# Patient Record
Sex: FEMALE | Race: WHITE | NOT HISPANIC OR LATINO | Employment: OTHER | URBAN - METROPOLITAN AREA
[De-identification: names, ages, dates, MRNs, and addresses within clinical notes are randomized per-mention and may not be internally consistent; named-entity substitution may affect disease eponyms.]

---

## 2017-02-06 NOTE — TELEPHONE ENCOUNTER
Metoprolol       Last Written Prescription Date: 02/06/2017  Last Fill Quantity: 180, # refills: 0  Last Office Visit with [facility], [facility] or [facility] prescribing provider: 06/30/2016       POTASSIUM   Date Value Ref Range Status   12/31/2015 5.1 3.4 - 5.3 mmol/L Final     CREATININE   Date Value Ref Range Status   12/31/2015 0.95 0.52 - 1.04 mg/dL Final     BP Readings from Last 3 Encounters:   06/30/16 124/70   12/31/15 160/80   06/08/15 135/79

## 2017-02-07 RX ORDER — METOPROLOL TARTRATE 25 MG/1
TABLET, FILM COATED ORAL
Qty: 180 TABLET | Refills: 0
Start: 2017-02-07

## 2017-02-10 ENCOUNTER — MYC MEDICAL ADVICE (OUTPATIENT)
Dept: OPHTHALMOLOGY | Facility: CLINIC | Age: 82
End: 2017-02-10

## 2017-02-10 NOTE — TELEPHONE ENCOUNTER
I spoke with Antoni () they want to set a time for 6-17 for routine COMPLETE EYE EXAM... Kelly will call patient. For scheduling exam.

## 2017-02-18 ENCOUNTER — MYC REFILL (OUTPATIENT)
Dept: FAMILY MEDICINE | Facility: CLINIC | Age: 82
End: 2017-02-18

## 2017-02-18 DIAGNOSIS — I10 BENIGN ESSENTIAL HYPERTENSION: ICD-10-CM

## 2017-02-20 RX ORDER — LISINOPRIL 5 MG/1
TABLET ORAL
Qty: 90 TABLET | Refills: 0 | OUTPATIENT
Start: 2017-02-20

## 2017-02-20 RX ORDER — LISINOPRIL 5 MG/1
5 TABLET ORAL DAILY
Qty: 90 TABLET | Refills: 0 | Status: SHIPPED | OUTPATIENT
Start: 2017-02-20 | End: 2017-05-13

## 2017-02-20 NOTE — TELEPHONE ENCOUNTER
Message from [employer]:  Edie Barlow Feb 20, 2017 7:37 AM        ----- Message -----   From: Raeann A Day   Sent: 2/18/2017 7:34 AM   To: Gerry Babb  Subject: Medication Renewal Request     Original authorizing provider: STAR Courtney CNP A Day would like a refill of the following medications:  lisinopril (PRINIVIL,ZESTRIL) 5 MG tablet [STAR Courtney CNP]    Preferred pharmacy: [facility] - [place], [place] - [address]    Comment:  Quantity 90, please

## 2017-02-24 ENCOUNTER — OFFICE VISIT (OUTPATIENT)
Dept: FAMILY MEDICINE | Facility: CLINIC | Age: 82
End: 2017-02-24
Payer: MEDICARE

## 2017-02-24 VITALS
HEIGHT: 62 IN | DIASTOLIC BLOOD PRESSURE: 60 MMHG | HEART RATE: 72 BPM | OXYGEN SATURATION: 97 % | WEIGHT: 173.4 LBS | BODY MASS INDEX: 31.91 KG/M2 | SYSTOLIC BLOOD PRESSURE: 116 MMHG | TEMPERATURE: 98.5 F

## 2017-02-24 DIAGNOSIS — I25.810 CORONARY ARTERY DISEASE INVOLVING AUTOLOGOUS ARTERY CORONARY BYPASS GRAFT WITHOUT ANGINA PECTORIS: ICD-10-CM

## 2017-02-24 DIAGNOSIS — N18.30 CKD (CHRONIC KIDNEY DISEASE) STAGE 3, GFR 30-59 ML/MIN (H): ICD-10-CM

## 2017-02-24 DIAGNOSIS — I10 HYPERTENSION GOAL BP (BLOOD PRESSURE) < 140/90: Primary | ICD-10-CM

## 2017-02-24 DIAGNOSIS — E78.5 HYPERLIPIDEMIA LDL GOAL <70: ICD-10-CM

## 2017-02-24 LAB
ANION GAP SERPL CALCULATED.3IONS-SCNC: 9 MMOL/L (ref 3–14)
BUN SERPL-MCNC: 17 MG/DL (ref 7–30)
CALCIUM SERPL-MCNC: 9.2 MG/DL (ref 8.5–10.1)
CHLORIDE SERPL-SCNC: 99 MMOL/L (ref 94–109)
CO2 SERPL-SCNC: 29 MMOL/L (ref 20–32)
CREAT SERPL-MCNC: 0.86 MG/DL (ref 0.52–1.04)
GFR SERPL CREATININE-BSD FRML MDRD: 63 ML/MIN/1.7M2
GLUCOSE SERPL-MCNC: 122 MG/DL (ref 70–99)
HGB BLD-MCNC: 13.2 G/DL (ref 11.7–15.7)
POTASSIUM SERPL-SCNC: 4 MMOL/L (ref 3.4–5.3)
SODIUM SERPL-SCNC: 137 MMOL/L (ref 133–144)

## 2017-02-24 PROCEDURE — 85018 HEMOGLOBIN: CPT | Performed by: NURSE PRACTITIONER

## 2017-02-24 PROCEDURE — 99214 OFFICE O/P EST MOD 30 MIN: CPT | Performed by: NURSE PRACTITIONER

## 2017-02-24 PROCEDURE — 36415 COLL VENOUS BLD VENIPUNCTURE: CPT | Performed by: NURSE PRACTITIONER

## 2017-02-24 PROCEDURE — 80048 BASIC METABOLIC PNL TOTAL CA: CPT | Performed by: NURSE PRACTITIONER

## 2017-02-24 NOTE — MR AVS SNAPSHOT
After Visit Summary   2/24/2017    Raeann Abdalla    MRN: [id number]           Patient Information     Date Of Birth          1935        Visit Information        Provider Department      2/24/2017 2:40 PM Luz Coughlin APRN [facility]        Today's Diagnoses     Hypertension goal BP (blood pressure) < 140/90    -  1    CKD (chronic kidney disease) stage 3, GFR 30-59 ml/min        Hyperlipidemia LDL goal <70        Coronary artery disease involving autologous artery coronary bypass graft without angina pectoris          Care Instructions    [facility]    If you have any questions regarding to your visit please contact your care team:     Team Pink:   Clinic Hours Telephone Number   Internal Medicine:  Dr. Yissel Coughlin, NP       7am-7pm  Monday - Thursday   7am-5pm  Fridays  [phone]  (Appointment scheduling available 24/7)    Questions about your visit?  Team Line  [phone]   Urgent Care - [place] - 11am-9pm Monday-Friday Saturday-Sunday- 9am-5pm   [place] - 5pm-9pm Monday-Friday Saturday-Sunday- 9am-5pm  [phone] - [place]   [phone] - [place]       What options do I have for visits at the clinic other than the traditional office visit?  To expand how we care for you, many of our providers are utilizing electronic visits (e-visits) and telephone visits, when medically appropriate, for interactions with their patients rather than a visit in the clinic.   We also offer nurse visits for many medical concerns. Just like any other service, we will bill your insurance company for this type of visit based on time spent on the phone with your provider. Not all insurance companies cover these visits. Please check with your medical insurance if this type of visit is covered. You will be responsible for any charges that are not paid by your insurance.      E-visits via  Algorithmiahart:  generally incur a $35.00 fee.  Telephone visits:  Time spent on the phone: *charged based on time that is spent on the phone in increments of 10 minutes. Estimated cost:   5-10 mins $30.00   11-20 mins. $59.00   21-30 mins. $85.00   Use Algorithmiahart (secure email communication and access to your chart) to send your primary care provider a message or make an appointment. Ask someone on your Team how to sign up for [employer].    For a Price Quote for your services, please call our [employer] Price Line at [phone].    As always, Thank you for trusting us with your health care needs!    Discharged by Marissa KEARNEY CMA ([facility])          Follow-ups after your visit        Your next 10 appointments already scheduled     Jun 23, 2017  2:30 PM CDT   New Visit with Ifeanyi Rosario MD   [facility] ([address] [address] [phone]              Who to contact     If you have questions or need follow up information about today's clinic visit or your schedule please contact [facility] directly at [phone].  Normal or non-critical lab and imaging results will be communicated to you by MyChart, letter or phone within 4 business days after the clinic has received the results. If you do not hear from us within 7 days, please contact the clinic through MyChart or phone. If you have a critical or abnormal lab result, we will notify you by phone as soon as possible.  Submit refill requests through [employer] or call your pharmacy and they will forward the refill request to us. Please allow 3 business days for your refill to be completed.          Additional Information About Your Visit        Algorithmiahart Information     [employer] gives you secure access to your electronic health record. If you see a primary care provider, you can also send messages to your care team and make appointments. If you have questions, please call your primary care clinic.  If you  "do not have a primary care provider, please call [phone] and they will assist you.        Care EveryWhere ID     This is your Care EveryWhere ID. This could be used by other organizations to access your [facility] medical records  [id number]        Your Vitals Were     Pulse Temperature Height Pulse Oximetry BMI (Body Mass Index)       72 98.5  F (36.9  C) (Oral) 5' 1.5\" (1.562 m) 97% 32.23 kg/m2        Blood Pressure from Last 3 Encounters:   02/24/17 116/60   06/30/16 124/70   12/31/15 160/80    Weight from Last 3 Encounters:   02/24/17 173 lb 6.4 oz (78.7 kg)   06/30/16 168 lb (76.2 kg)   12/31/15 168 lb (76.2 kg)              We Performed the Following     Basic metabolic panel     Hemoglobin        Primary Care Provider Office Phone # Fax #    Luz HollidaySTAR Bhatti [facility] [phone]       [address] [address]        Thank you!     Thank you for choosing [facility]  for your care. Our goal is always to provide you with excellent care. Hearing back from our patients is one way we can continue to improve our services. Please take a few minutes to complete the written survey that you may receive in the mail after your visit with us. Thank you!             Your Updated Medication List - Protect others around you: Learn how to safely use, store and throw away your medicines at www.disposemymeds.org.          This list is accurate as of: 2/24/17  3:14 PM.  Always use your most recent med list.                   Brand Name Dispense Instructions for use    aspirin 325 MG tablet      Take 325 mg by mouth daily Patient takes 162.5 mg       carboxymethylcellulose 0.5 % Soln ophthalmic solution    REFRESH PLUS     Place 1 drop into both eyes daily as needed for dry eyes       lisinopril 5 MG tablet    PRINIVIL/ZESTRIL    90 tablet    Take 1 tablet (5 mg) by mouth daily Please schedule follow up visit       metoprolol 25 MG tablet    " LOPRESSOR    180 tablet    Take 1 tablet (25 mg) by mouth 2 times daily       MULTI VITAMIN/MINERALS PO      Take 1 tablet by mouth once.       SIMVASTATIN PO      Take 20 mg by mouth At Bedtime

## 2017-02-24 NOTE — PATIENT INSTRUCTIONS
[facility]    If you have any questions regarding to your visit please contact your care team:     Team Pink:   Clinic Hours Telephone Number   Internal Medicine:  Dr. Yissel Coughlin NP       7am-7pm  Monday - Thursday   7am-5pm  Fridays  [phone]  (Appointment scheduling available 24/7)    Questions about your visit?  Team Line  [phone]   [facility] - [place] [facility] - 11am-9pm Monday-Friday Saturday-Sunday- 9am-5pm   [place] - 5pm-9pm Monday-Friday Saturday-Sunday- 9am-5pm  [phone] - [place]   [phone] - [place]       What options do I have for visits at the clinic other than the traditional office visit?  To expand how we care for you, many of our providers are utilizing electronic visits (e-visits) and telephone visits, when medically appropriate, for interactions with their patients rather than a visit in the clinic.   We also offer nurse visits for many medical concerns. Just like any other service, we will bill your insurance company for this type of visit based on time spent on the phone with your provider. Not all insurance companies cover these visits. Please check with your medical insurance if this type of visit is covered. You will be responsible for any charges that are not paid by your insurance.      E-visits via [employer]:  generally incur a $35.00 fee.  Telephone visits:  Time spent on the phone: *charged based on time that is spent on the phone in increments of 10 minutes. Estimated cost:   5-10 mins $30.00   11-20 mins. $59.00   21-30 mins. $85.00   Use Sribut (secure email communication and access to your chart) to send your primary care provider a message or make an appointment. Ask someone on your Team how to sign up for [employer].    For a Price Quote for your services, please call our Consumer Price Line at [phone].    As always, Thank you for trusting us with your health care  needs!    Discharged by Marissa KEARNEY CMA ([facility])

## 2017-02-24 NOTE — PROGRESS NOTES
Dear Raeann,    Your recent test results are attached.      No anemia.    If you have any questions please feel free to contact [phone] or myself via [employer].    Sincerely,  Luz Coughlin, CNP

## 2017-02-24 NOTE — NURSING NOTE
"Chief Complaint   Patient presents with     Hypertension     follow up bp        Initial /78 (BP Location: Right arm, Patient Position: Chair, Cuff Size: Adult Regular)  Pulse 72  Temp 98.5  F (36.9  C) (Oral)  Ht 5' 1.5\" (1.562 m)  Wt 173 lb 6.4 oz (78.7 kg)  SpO2 97%  BMI 32.23 kg/m2 Estimated body mass index is 32.23 kg/(m^2) as calculated from the following:    Height as of this encounter: 5' 1.5\" (1.562 m).    Weight as of this encounter: 173 lb 6.4 oz (78.7 kg).  Medication Reconciliation: complete   Marissa KEARNEY CMA ([facility])      "

## 2017-02-24 NOTE — PROGRESS NOTES
SUBJECTIVE:                                                    Raeann Abdalla is a 81 year old female who presents to clinic today for the following health issues:        Patient presents with:  Hypertension: follow up bp     Hyperlipidemia Follow-Up      Rate your low fat/cholesterol diet?: not monitoring fat    Taking statin?  Yes, no muscle aches from statin    Other lipid medications/supplements?:  none     Hypertension Follow-up      Outpatient blood pressures are being checked at home.  Results are 130-150.    Low Salt Diet: no added salt     Vascular Disease Follow-up:  Coronary Artery Disease (CAD)      Chest pain or pressure, left side neck or arm pain: No    Shortness of breath/increased sweats/nausea with exertion: No    Pain in calves walking 1-2 blocks: No    Worsened or new symptoms since last visit: No    Nitroglycerin use: no    Daily aspirin use: Yes     Chronic Kidney Disease Follow-up      Current NSAID use?  No      Problem list and histories reviewed & adjusted, as indicated.  Additional history: as documented    Patient Active Problem List   Diagnosis     Cataract, mod, ou     Hx of retinal hemorrhage     Hypertension goal BP (blood pressure) < 140/90     S/P AVR (aortic valve replacement)     S/P CABG (coronary artery bypass graft)     Short-term memory loss     S/P bilateral hip replacements     History of right-sided carotid endarterectomy     CKD (chronic kidney disease) stage 3, GFR 30-59 ml/min     Hyperlipidemia LDL goal <70     Coronary artery disease involving autologous artery coronary bypass graft without angina pectoris     Posterior vitreous detachment, bilateral     Past Surgical History   Procedure Laterality Date     Ent surgery       Tonsillectomy N/A      Joint replacement, hip rt/lt Bilateral      Endarectomy Right      carotid     Aortic valve replacement       Bypass graft artery coronary       X 3       Social History   Substance Use Topics     Smoking status: Never  "Smoker     Smokeless tobacco: Not on file     Alcohol use 0.0 oz/week     0 Standard drinks or equivalent per week      Comment: one beer a day     Family History   Problem Relation Age of Onset     Other Cancer Sister      Macular Degeneration Mother 75         Current Outpatient Prescriptions   Medication Sig Dispense Refill     lisinopril (PRINIVIL/ZESTRIL) 5 MG tablet Take 1 tablet (5 mg) by mouth daily Please schedule follow up visit 90 tablet 0     metoprolol (LOPRESSOR) 25 MG tablet Take 1 tablet (25 mg) by mouth 2 times daily 180 tablet 0     carboxymethylcellulose (REFRESH PLUS) 0.5 % SOLN Place 1 drop into both eyes daily as needed for dry eyes       aspirin 325 MG tablet Take 325 mg by mouth daily Patient takes 162.5 mg       SIMVASTATIN PO Take 20 mg by mouth At Bedtime        Multiple Vitamins-Minerals (MULTI VITAMIN/MINERALS PO) Take 1 tablet by mouth once.       Allergies   Allergen Reactions     Sulfa Drugs      BP Readings from Last 3 Encounters:   02/24/17 116/60   06/30/16 124/70   12/31/15 160/80    Wt Readings from Last 3 Encounters:   02/24/17 173 lb 6.4 oz (78.7 kg)   06/30/16 168 lb (76.2 kg)   12/31/15 168 lb (76.2 kg)                  Problem list, Medication list, Allergies, and Medical/Social/Surgical histories reviewed in [facility] and updated as appropriate.    ROS:  Constitutional, HEENT, cardiovascular, pulmonary, gi and gu systems are negative, except as otherwise noted.    OBJECTIVE:                                                    /60  Pulse 72  Temp 98.5  F (36.9  C) (Oral)  Ht 5' 1.5\" (1.562 m)  Wt 173 lb 6.4 oz (78.7 kg)  SpO2 97%  BMI 32.23 kg/m2  Body mass index is 32.23 kg/(m^2).  GENERAL: healthy, alert and no distress  EYES: Eyes grossly normal to inspection, PERRL and conjunctivae and sclerae normal  HENT: ear canals and TM's normal, nose and mouth without ulcers or lesions  NECK: no adenopathy, no asymmetry, masses, or scars and thyroid normal to palpation  RESP: lungs " clear to auscultation - no rales, rhonchi or wheezes  CV: regular rate and rhythm, normal S1 S2, no S3 or S4, no murmur, click or rub, no peripheral edema and peripheral pulses strong  ABDOMEN: soft, nontender, no hepatosplenomegaly, no masses and bowel sounds normal  MS: no gross musculoskeletal defects noted, no edema    Diagnostic Test Results:  pending     ASSESSMENT/PLAN:                                                      1. Hypertension goal BP (blood pressure) < 140/90  Stable.  Continue current treatment plan and medications.    - Hemoglobin    2. CKD (chronic kidney disease) stage 3, GFR 30-59 ml/min    - Basic metabolic panel  - Hemoglobin    3. Hyperlipidemia LDL goal <70  Stable.  Continue current treatment plan and medications.      4. Coronary artery disease involving autologous artery coronary bypass graft without angina pectoris  Stable.  Continue current treatment plan and medications.        FUTURE APPOINTMENTS:       - Follow-up for annual visit or as needed    STAR Courtney [facility]

## 2017-02-27 NOTE — PROGRESS NOTES
Dear Raeann,    Your recent test results are attached.      Improved kidney function.    If you have any questions please feel free to contact [phone] or myself via [employer].    Sincerely,  Luz Coughlin, CNP

## 2017-03-25 ENCOUNTER — TRANSFERRED RECORDS (OUTPATIENT)
Dept: HEALTH INFORMATION MANAGEMENT | Facility: CLINIC | Age: 82
End: 2017-03-25

## 2017-05-13 ENCOUNTER — MYC REFILL (OUTPATIENT)
Dept: FAMILY MEDICINE | Facility: CLINIC | Age: 82
End: 2017-05-13

## 2017-05-13 DIAGNOSIS — I10 BENIGN ESSENTIAL HYPERTENSION: ICD-10-CM

## 2017-05-15 RX ORDER — LISINOPRIL 5 MG/1
5 TABLET ORAL DAILY
Qty: 90 TABLET | Refills: 2 | Status: SHIPPED | OUTPATIENT
Start: 2017-05-15 | End: 2017-07-20

## 2017-05-15 RX ORDER — METOPROLOL TARTRATE 25 MG/1
25 TABLET, FILM COATED ORAL 2 TIMES DAILY
Qty: 180 TABLET | Refills: 2 | Status: SHIPPED | OUTPATIENT
Start: 2017-05-15 | End: 2018-02-10

## 2017-05-15 NOTE — TELEPHONE ENCOUNTER
Message from [employer]:  Edie Barlow May 15, 2017 7:34 AM        ----- Message -----   From: Raeann Abdalla   Sent: 5/13/2017 7:40 AM   To: Gerry Babb  Subject: Medication Renewal Request     Original authorizing provider: STAR Courtney CNP would like a refill of the following medications:  metoprolol (LOPRESSOR) 25 MG tablet [STAR Courtney CNP]  lisinopril (PRINIVIL/ZESTRIL) 5 MG tablet [STAR Courtney CNP]    [facility] pharmacy: [facility] [address]    Comment:  Lisinopril qty 90 Metoprolol tartrate qty 180

## 2017-05-15 NOTE — TELEPHONE ENCOUNTER
Prescription approved per [facility] Refill Protocol.    Signed Prescriptions:                        Disp   Refills    metoprolol (LOPRESSOR) 25 MG tablet        180 ta*2        Sig: Take 1 tablet (25 mg) by mouth 2 times daily  Authorizing Provider: NIKKO JACOB  Ordering User: OSWALDO CARSON    lisinopril (PRINIVIL/ZESTRIL) 5 MG tablet  90 tab*2        Sig: Take 1 tablet (5 mg) by mouth daily Please schedule           follow up visit  Authorizing Provider: NIKKO JACOB  Ordering User: OSWALDO CARSON, RN, BSN

## 2017-05-16 RX ORDER — METOPROLOL TARTRATE 25 MG/1
TABLET, FILM COATED ORAL
Qty: 180 TABLET | Refills: 0
Start: 2017-05-16

## 2017-05-16 RX ORDER — LISINOPRIL 5 MG/1
TABLET ORAL
Qty: 90 TABLET | Refills: 0
Start: 2017-05-16

## 2017-05-16 NOTE — TELEPHONE ENCOUNTER
Prescription jasmin'd up has now been removed from encounter.    Closing encounter.    Kayleen Bustos RN

## 2017-05-16 NOTE — TELEPHONE ENCOUNTER
Writer called pharmacy to confirm refills remaining pharmacy confirms  Rx  received 05/15 for both medication metoprolol and lisinopril  please close encounter      Shara Hdz, CMA

## 2017-06-23 ENCOUNTER — OFFICE VISIT (OUTPATIENT)
Dept: OPHTHALMOLOGY | Facility: CLINIC | Age: 82
End: 2017-06-23
Payer: MEDICARE

## 2017-06-23 DIAGNOSIS — H43.813 POSTERIOR VITREOUS DETACHMENT, BILATERAL: ICD-10-CM

## 2017-06-23 DIAGNOSIS — H26.9 CATARACT: Primary | ICD-10-CM

## 2017-06-23 DIAGNOSIS — H52.4 PRESBYOPIA: ICD-10-CM

## 2017-06-23 PROCEDURE — 92015 DETERMINE REFRACTIVE STATE: CPT | Mod: GY | Performed by: OPHTHALMOLOGY

## 2017-06-23 PROCEDURE — 92014 COMPRE OPH EXAM EST PT 1/>: CPT | Performed by: OPHTHALMOLOGY

## 2017-06-23 ASSESSMENT — REFRACTION_WEARINGRX
OD_ADD: +3.00
OS_SPHERE: -1.50
OS_CYLINDER: +1.50
OD_CYLINDER: +0.50
OD_SPHERE: -0.75
OD_AXIS: 160
OS_ADD: +3.00
SPECS_TYPE: PAL
OS_AXIS: 170

## 2017-06-23 ASSESSMENT — CONF VISUAL FIELD
OS_NORMAL: 1
OD_NORMAL: 1

## 2017-06-23 ASSESSMENT — VISUAL ACUITY
OD_CC: 20/50
OS_CC+: -2
OS_CC: J10
OS_CC: 20/50
METHOD: SNELLEN - LINEAR
OD_CC: J10
CORRECTION_TYPE: GLASSES

## 2017-06-23 ASSESSMENT — CUP TO DISC RATIO
OD_RATIO: 0.2
OS_RATIO: 0.2

## 2017-06-23 ASSESSMENT — REFRACTION_MANIFEST
OD_SPHERE: -1.25
OS_SPHERE: -2.00
OD_ADD: +3.00
OD_AXIS: 155
OS_AXIS: 010
OS_ADD: +3.00
OD_CYLINDER: +0.25
OS_CYLINDER: +0.75

## 2017-06-23 ASSESSMENT — SLIT LAMP EXAM - LIDS
COMMENTS: 2+ DERMATOCHALASIS - UPPER LID
COMMENTS: 2+ DERMATOCHALASIS - UPPER LID

## 2017-06-23 ASSESSMENT — TONOMETRY
OS_IOP_MMHG: 16
IOP_METHOD: APPLANATION
OD_IOP_MMHG: 14

## 2017-06-23 ASSESSMENT — EXTERNAL EXAM - RIGHT EYE: OD_EXAM: MILD BROW, PROLAPSED FAT PADS: UPPER, LOWER

## 2017-06-23 ASSESSMENT — EXTERNAL EXAM - LEFT EYE: OS_EXAM: MILD BROW, PROLAPSED FAT PADS: UPPER, LOWER

## 2017-06-23 NOTE — PATIENT INSTRUCTIONS
Glasses Rx given - optional   Plan Kelman phacoemulsification/ posterior chamber lens right eye local standby.  Risks, benefits, complications, and alternatives discussed with patient including possibility of limitations from coexistent eye disease and loss of vision.  Target refraction and multifocal lens options discussed.  Patient understands and consents.  Ifeanyi Rosario M.D.

## 2017-06-23 NOTE — MR AVS SNAPSHOT
After Visit Summary   6/23/2017    Raeann Abdalla    MRN: [id number]           Patient Information     Date Of Birth          1935        Visit Information        Provider Department      6/23/2017 2:30 PM Ifeanyi Rosario MD [facility]        Today's Diagnoses     Cataract, mod, ou    -  1    Posterior vitreous detachment, bilateral        Myopia, bilateral        Irregular astigmatism, bilateral        Presbyopia          Care Instructions    Glasses Rx given - optional   Plan Kelman phacoemulsification/ posterior chamber lens right eye local standby.  Risks, benefits, complications, and alternatives discussed with patient including possibility of limitations from coexistent eye disease and loss of vision.  Target refraction and multifocal lens options discussed.  Patient understands and consents.  Ifeanyi Rosario M.D.            Follow-ups after your visit        Who to contact     If you have questions or need follow up information about today's clinic visit or your schedule please contact [facility] directly at [phone].  Normal or non-critical lab and imaging results will be communicated to you by beprettyhart, letter or phone within 4 business days after the clinic has received the results. If you do not hear from us within 7 days, please contact the clinic through [employer] or phone. If you have a critical or abnormal lab result, we will notify you by phone as soon as possible.  Submit refill requests through [employer] or call your pharmacy and they will forward the refill request to us. Please allow 3 business days for your refill to be completed.          Additional Information About Your Visit        beprettyhart Information     [employer] gives you secure access to your electronic health record. If you see a primary care provider, you can also send messages to your care team and make appointments. If you have questions, please call your primary care clinic.  If you do  not have a primary care provider, please call [phone] and they will assist you.        Care EveryWhere ID     This is your Care EveryWhere ID. This could be used by other organizations to access your [facility] medical records  [id number]         Blood Pressure from Last 3 Encounters:   02/24/17 116/60   06/30/16 124/70   12/31/15 160/80    Weight from Last 3 Encounters:   02/24/17 78.7 kg (173 lb 6.4 oz)   06/30/16 76.2 kg (168 lb)   12/31/15 76.2 kg (168 lb)              We Performed the Following     EYE EXAM (SIMPLE-NONBILLABLE)     REFRACTION        Primary Care Provider Office Phone # Fax #    Luz HollidaySTAR Bhatti [facility] [phone]       [address] [address]        Equal Access to Services     FERNANDA MARTÍNEZ : Hadii aad ku hadasho Soomaali, waaxda luqadaha, qaybta kaalmada adeegyada, waxay idiin hayaan darren marrufoararoger ash . So [facility] [phone].    ATENCIÓN: Si habla español, tiene a sharma disposición servicios gratuitos de asistencia lingüística. Ashly al [phone].    We comply with applicable federal civil rights laws and [place] laws. We do not discriminate on the basis of race, color, national origin, age, disability sex, sexual orientation or gender identity.            Thank you!     Thank you for choosing [facility]  for your care. Our goal is always to provide you with excellent care. Hearing back from our patients is one way we can continue to improve our services. Please take a few minutes to complete the written survey that you may receive in the mail after your visit with us. Thank you!             Your Updated Medication List - Protect others around you: Learn how to safely use, store and throw away your medicines at www.disposemymeds.org.          This list is accurate as of: 6/23/17  3:20 PM.  Always use your most recent med list.                   Brand Name Dispense Instructions for use Diagnosis     aspirin 325 MG tablet      Take 325 mg by mouth daily Patient takes 162.5 mg        carboxymethylcellulose 0.5 % Soln ophthalmic solution    REFRESH PLUS     Place 1 drop into both eyes daily as needed for dry eyes        lisinopril 5 MG tablet    PRINIVIL/ZESTRIL    90 tablet    Take 1 tablet (5 mg) by mouth daily Please schedule follow up visit    Benign essential hypertension       metoprolol 25 MG tablet    LOPRESSOR    180 tablet    Take 1 tablet (25 mg) by mouth 2 times daily    Benign essential hypertension       MULTI VITAMIN/MINERALS PO      Take 1 tablet by mouth once.        SIMVASTATIN PO      Take 20 mg by mouth At Bedtime

## 2017-06-23 NOTE — PROGRESS NOTES
Current Eye Medications:  Artificial Tears QAM OU     Subjective:  Complete Exam - Happy with current glasses,  thinks her vision is worse.     Objective:  See Ophthalmology Exam.       Assessment: Visually significant cataract right eye.      ICD-10-CM    1. Cataract, mod, ou H26.9 EYE EXAM (SIMPLE-NONBILLABLE)   2. Posterior vitreous detachment, bilateral H43.813    3. Presbyopia H52.4 REFRACTION        Plan:  Patient wishes to proceed with cataract surgery right eye.  Probably left eye thereafter.  Plan Kelman phacoemulsification/ posterior chamber lens right eye local standby.  Risks, benefits, complications, and alternatives discussed with patient including possibility of limitations from coexistent eye disease and loss of vision.  Target refraction and multifocal lens options discussed.  Patient understands and consents.  Ifeanyi Rosario M.D.

## 2017-07-12 NOTE — PROGRESS NOTES
[facility]  [address] [place] [address]  [place] [address]  [phone]  Dept: [phone]    PRE-OP EVALUATION:  Today's date: 2017    Raeann Abdalla (: 1935) presents for pre-operative evaluation assessment as requested by Dr. Rosario.  She requires evaluation and anesthesia risk assessment prior to undergoing surgery/procedure for treatment of Right eye .  Proposed procedure: Cataract removal right eye    Date of Surgery/ Procedure: 17  Time of Surgery/ Procedure: [facility]/Surgical Facility: [facility]  Fax number for surgical facility:   Primary Physician: Luz Coughlin  Type of Anesthesia Anticipated: to be determined    Patient has a Health Care Directive or Living Will:  NO    Preop Questions 2017   1.  Do you have a history of heart attack, stroke, stent, bypass or surgery on an artery in the head, neck, heart or legs? YES - CABG X 3 with bioprosthetic AVR in 2014   2.  Do you ever have any pain or discomfort in your chest? No   3.  Do you have a history of  Heart Failure? No   4.   Are you troubled by shortness of breath when:  walking on a level surface, or up a slight hill, or at night? No   5.  Do you currently have a cold, bronchitis or other respiratory infection? No   6.  Do you have a cough, shortness of breath, or wheezing? No   7.  Do you sometimes get pains in the calves of your legs when you walk? No   8. Do you or anyone in your family have previous history of blood clots? No   9.  Do you or does anyone in your family have a serious bleeding problem such as prolonged bleeding following surgeries or cuts? No   10. Have you ever had problems with anemia or been told to take iron pills? YES- following CABG   11. Have you had any abnormal blood loss such as black, tarry or bloody stools, or abnormal vaginal bleeding? No   12. Have you ever had a blood transfusion? YES - After CABG   13. Have you or any of your relatives ever had problems  with anesthesia? No   14. Do you have sleep apnea, excessive snoring or daytime drowsiness? No   15. Do you have any prosthetic heart valves? YES - Aortic valve   16. Do you have prosthetic joints? YES - Bilateral hips   17. Is there any chance that you may be pregnant? No       TORI    HPI:                                                      Brief HPI related to upcoming procedure: Patient will have right eye cataract removed.      HYPERTENSION - Patient has longstanding history of mod-severe HTN , currently denies any symptoms referable to elevated blood pressure. Specifically denies chest pain, palpitations, dyspnea, orthopnea, PND or peripheral edema. Blood pressure readings have been in normal range. Current medication regimen is as listed below. Patient denies any side effects of medication.                                                                                                                                                                                          .  HYPERLIPIDEMIA - Patient has a long history of significant Hyperlipidemia requiring medication for treatment with recent good control. Patient reports no problems or side effects with the medication.                                                                                                                                                       .  CAD - Patient has a longstanding history of mod-severe CAD. Patient denies recent chest pain or NTG use, denies exercise induced dyspnea or PND.                                                                                                                             .  RENAL INSUFFICIENCY - Patient has a longstanding history of chronic renal insufficiency of moderate severity.  Last Cr 0.86.                                                                                                                                                                                   MEDICAL  HISTORY:                                                      Patient Active Problem List    Diagnosis Date Noted     Posterior vitreous detachment, bilateral 06/22/2016     Priority: Medium     Coronary artery disease involving autologous artery coronary bypass graft without angina pectoris 12/31/2015     Priority: Medium     Hypertension goal BP (blood pressure) < 140/90 02/03/2015     Priority: Medium     S/P AVR (aortic valve replacement) 02/03/2015     Priority: Medium     S/P CABG (coronary artery bypass graft) 02/03/2015     Priority: Medium     Follows with Dr. Deepali [facility]       Short-term memory loss 02/03/2015     Priority: Medium     S/P bilateral hip replacements 02/03/2015     Priority: Medium     History of right-sided carotid endarterectomy 02/03/2015     Priority: Medium     Do you wish to do the replacement in the background? yes         CKD (chronic kidney disease) stage 3, GFR 30-59 ml/min 02/03/2015     Priority: Medium     Hyperlipidemia LDL goal <70 02/03/2015     Priority: Medium     Hx of retinal hemorrhage 05/05/2014     Priority: Medium     Cataract, mod, ou 01/05/2011     Priority: Medium               Past Medical History:   Diagnosis Date     Arthritis      Heart disease years ago    aortic stenosis     History of blood transfusion 2014    during aortic valve replacement     HTN (hypertension)      Hyperlipemia      Nonsenile cataract      Past Surgical History:   Procedure Laterality Date     ABDOMEN SURGERY  Feb. 2014    aortic valve replacement     AORTIC VALVE REPLACEMENT       BYPASS GRAFT ARTERY CORONARY      X 3     COLONOSCOPY  2010 ?    polyps found for first time     endarectomy Right     carotid     ENT SURGERY       HEAD & NECK SURGERY      rt carotid artery neck plaque removal     JOINT REPLACEMENT, HIP RT/LT Bilateral      THORACIC SURGERY  2014    aortic valve replacement, triple bypass     TONSILLECTOMY N/A      VASCULAR SURGERY  2013    rt carotid artery neck  "plaque removal     Current Outpatient Prescriptions   Medication Sig Dispense Refill     metoprolol (LOPRESSOR) 25 MG tablet Take 1 tablet (25 mg) by mouth 2 times daily 180 tablet 2     lisinopril (PRINIVIL/ZESTRIL) 5 MG tablet Take 1 tablet (5 mg) by mouth daily Please schedule follow up visit 90 tablet 2     carboxymethylcellulose (REFRESH PLUS) 0.5 % SOLN Place 1 drop into both eyes daily as needed for dry eyes       aspirin 325 MG tablet Take 325 mg by mouth daily Patient takes 162.5 mg       SIMVASTATIN PO Take 20 mg by mouth At Bedtime        Multiple Vitamins-Minerals (MULTI VITAMIN/MINERALS PO) Take 1 tablet by mouth once.       OTC products: Acetaminophen    Allergies   Allergen Reactions     Sulfa Drugs       Latex Allergy: NO    Social History   Substance Use Topics     Smoking status: Never Smoker     Smokeless tobacco: Not on file     Alcohol use 0.0 oz/week      Comment: 12 0z beer daily     History   Drug Use No       REVIEW OF SYSTEMS:                                                    Constitutional, neuro, ENT, endocrine, pulmonary, cardiac, gastrointestinal, genitourinary, musculoskeletal, integument and psychiatric systems are negative, except as otherwise noted.    EXAM:                                                    /70  Pulse 68  Temp 97.4  F (36.3  C) (Oral)  Ht 5' 1.5\" (1.562 m)  Wt 175 lb (79.4 kg)  SpO2 98%  Breastfeeding? No  BMI 32.53 kg/m2    GENERAL APPEARANCE: healthy, alert and no distress     EYES: EOMI, PERRL     HENT: ear canals and TM's normal and nose and mouth without ulcers or lesions     NECK: no adenopathy, no asymmetry, masses, or scars and thyroid normal to palpation     RESP: lungs clear to auscultation - no rales, rhonchi or wheezes     CV: regular rates and rhythm, normal S1 S2, no S3 or S4 and no murmur, click or rub     ABDOMEN:  soft, nontender, no HSM or masses and bowel sounds normal     MS: extremities normal- no gross deformities noted, no " evidence of inflammation in joints, FROM in all extremities.     SKIN: no suspicious lesions or rashes     NEURO: Normal strength and tone, sensory exam grossly normal, mentation intact and speech normal     PSYCH: mentation appears normal. and affect normal/bright     LYMPHATICS: No axillary, cervical, or supraclavicular nodes    DIAGNOSTICS:                                                    No labs or EKG required for low risk surgery (cataract, skin procedure, breast biopsy, etc)    Recent Labs   Lab Test  02/24/17   1517  12/31/15   1103   HGB  13.2   --    NA  137  135   POTASSIUM  4.0  5.1   CR  0.86  0.95        IMPRESSION:                                                    Reason for surgery/procedure: Right eye cataract  Diagnosis/reason for consult: Management of comorbid conditions and preoperative exam.      The proposed surgical procedure is considered LOW risk.    REVISED CARDIAC RISK INDEX  The patient has the following serious cardiovascular risks for perioperative complications such as (MI, PE, VFib and 3  AV Block):  Coronary Artery Disease (MI, positive stress test, angina, Qs on EKG)  INTERPRETATION: 1 risks: Class II (low risk - 0.9% complication rate)    The patient has the following additional risks for perioperative complications:  Delirium or Dementia      ICD-10-CM    1. Preop general physical exam Z01.818    2. Cataract of right eye, unspecified cataract type H26.9    3. Hyperlipidemia LDL goal <70 E78.5 Lipid panel reflex to direct LDL   4. CKD (chronic kidney disease) stage 3, GFR 30-59 ml/min N18.3 Basic metabolic panel       RECOMMENDATIONS:                                                        Cardiovascular Risk  Performs 4 METs exercise without symptoms (Light housework (dusting, washing dishes)) .   Patient is already on a Beta Blocker. Continue Betablocker therapy after surgery, using Beta blocker order set as necessary for NPO status.      --Patient is to take all scheduled  medications on the day of surgery EXCEPT for modifications listed below.    Anticoagulant or Antiplatelet Medication Use  ASPIRIN: Discontinue ASA 7-10 days prior to procedure to reduce bleeding risk.  It should be resumed post-operatively.        APPROVAL GIVEN to proceed with proposed procedure, without further diagnostic evaluation       Signed Electronically by: STAR Courtney CNP    Copy of this evaluation report is provided to requesting physician.    [place] Preop Guidelines

## 2017-07-12 NOTE — PATIENT INSTRUCTIONS
Hold aspirin 7 days prior to surgery.  Okay to take your other medications with small sips of water prior to surgery.    Before Your Surgery      Call your surgeon if there is any change in your health. This includes signs of a cold or flu (such as a sore throat, runny nose, cough, rash or fever).    Do not smoke, drink alcohol or take over the counter medicine (unless your surgeon or primary care doctor tells you to) for the 24 hours before and after surgery.    If you take prescribed drugs: Follow your doctor s orders about which medicines to take and which to stop until after surgery.    Eating and drinking prior to surgery: follow the instructions from your surgeon    Take a shower or bath the night before surgery. Use the soap your surgeon gave you to gently clean your skin. If you do not have soap from your surgeon, use your regular soap. Do not shave or scrub the surgery site.  Wear clean pajamas and have clean sheets on your bed.   [facility]    If you have any questions regarding to your visit please contact your care team:     Team Pink:   Clinic Hours Telephone Number   Internal Medicine:  Dr. Yissel Coughlin NP       7am-7pm  Monday - Thursday   7am-5pm  Fridays  [phone]  (Appointment scheduling available 24/7)    Questions about your visit?  Team Line  [phone]   Urgent Care - [place] - 11am-9pm Monday-Friday Saturday-Sunday- 9am-5pm   [place] - 5pm-9pm Monday-Friday Saturday-Sunday- 9am-5pm  [phone] - [place]   [phone] - [place]       What options do I have for visits at the clinic other than the traditional office visit?  To expand how we care for you, many of our providers are utilizing electronic visits (e-visits) and telephone visits, when medically appropriate, for interactions with their patients rather than a visit in the clinic.   We also offer nurse visits for many medical concerns. Just  like any other service, we will bill your insurance company for this type of visit based on time spent on the phone with your provider. Not all insurance companies cover these visits. Please check with your medical insurance if this type of visit is covered. You will be responsible for any charges that are not paid by your insurance.      E-visits via [employer]:  generally incur a $35.00 fee.  Telephone visits:  Time spent on the phone: *charged based on time that is spent on the phone in increments of 10 minutes. Estimated cost:   5-10 mins $30.00   11-20 mins. $59.00   21-30 mins. $85.00   Use [employer] (secure email communication and access to your chart) to send your primary care provider a message or make an appointment. Ask someone on your Team how to sign up for [employer] Match.    For a Price Quote for your services, please call our Consumer Price Line at [phone].    As always, Thank you for trusting us with your health care needs!        ROBERT/MA

## 2017-07-18 ENCOUNTER — OFFICE VISIT (OUTPATIENT)
Dept: OPHTHALMOLOGY | Facility: CLINIC | Age: 82
End: 2017-07-18
Payer: MEDICARE

## 2017-07-18 ENCOUNTER — OFFICE VISIT (OUTPATIENT)
Dept: FAMILY MEDICINE | Facility: CLINIC | Age: 82
End: 2017-07-18
Payer: MEDICARE

## 2017-07-18 VITALS
HEIGHT: 62 IN | BODY MASS INDEX: 32.2 KG/M2 | OXYGEN SATURATION: 98 % | SYSTOLIC BLOOD PRESSURE: 124 MMHG | DIASTOLIC BLOOD PRESSURE: 70 MMHG | HEART RATE: 68 BPM | WEIGHT: 175 LBS | TEMPERATURE: 97.4 F

## 2017-07-18 DIAGNOSIS — I10 BENIGN ESSENTIAL HYPERTENSION: ICD-10-CM

## 2017-07-18 DIAGNOSIS — E87.5 HYPERKALEMIA: ICD-10-CM

## 2017-07-18 DIAGNOSIS — Z01.818 PREOP GENERAL PHYSICAL EXAM: Primary | ICD-10-CM

## 2017-07-18 DIAGNOSIS — E78.5 HYPERLIPIDEMIA LDL GOAL <70: ICD-10-CM

## 2017-07-18 DIAGNOSIS — N18.30 CKD (CHRONIC KIDNEY DISEASE) STAGE 3, GFR 30-59 ML/MIN (H): ICD-10-CM

## 2017-07-18 DIAGNOSIS — H26.9 CATARACT OF RIGHT EYE, UNSPECIFIED CATARACT TYPE: ICD-10-CM

## 2017-07-18 DIAGNOSIS — H25.813 COMBINED FORMS OF AGE-RELATED CATARACT OF BOTH EYES: Primary | ICD-10-CM

## 2017-07-18 LAB
ANION GAP SERPL CALCULATED.3IONS-SCNC: 6 MMOL/L (ref 3–14)
BUN SERPL-MCNC: 12 MG/DL (ref 7–30)
CALCIUM SERPL-MCNC: 9.6 MG/DL (ref 8.5–10.1)
CHLORIDE SERPL-SCNC: 102 MMOL/L (ref 94–109)
CHOLEST SERPL-MCNC: 189 MG/DL
CO2 SERPL-SCNC: 29 MMOL/L (ref 20–32)
CREAT SERPL-MCNC: 0.97 MG/DL (ref 0.52–1.04)
GFR SERPL CREATININE-BSD FRML MDRD: 55 ML/MIN/1.7M2
GLUCOSE SERPL-MCNC: 91 MG/DL (ref 70–99)
HDLC SERPL-MCNC: 47 MG/DL
LDLC SERPL CALC-MCNC: 99 MG/DL
NONHDLC SERPL-MCNC: 142 MG/DL
POTASSIUM SERPL-SCNC: 5.5 MMOL/L (ref 3.4–5.3)
SODIUM SERPL-SCNC: 137 MMOL/L (ref 133–144)
TRIGL SERPL-MCNC: 217 MG/DL

## 2017-07-18 PROCEDURE — 99214 OFFICE O/P EST MOD 30 MIN: CPT | Performed by: NURSE PRACTITIONER

## 2017-07-18 PROCEDURE — 92136 OPHTHALMIC BIOMETRY: CPT | Mod: 26 | Performed by: OPHTHALMOLOGY

## 2017-07-18 PROCEDURE — 36415 COLL VENOUS BLD VENIPUNCTURE: CPT | Performed by: NURSE PRACTITIONER

## 2017-07-18 PROCEDURE — 92012 INTRM OPH EXAM EST PATIENT: CPT | Performed by: OPHTHALMOLOGY

## 2017-07-18 PROCEDURE — 80061 LIPID PANEL: CPT | Performed by: NURSE PRACTITIONER

## 2017-07-18 PROCEDURE — 80048 BASIC METABOLIC PNL TOTAL CA: CPT | Performed by: NURSE PRACTITIONER

## 2017-07-18 RX ORDER — PREDNISOLONE ACETATE 10 MG/ML
1 SUSPENSION/ DROPS OPHTHALMIC 3 TIMES DAILY
Qty: 5 ML | Refills: 0 | Status: SHIPPED | OUTPATIENT
Start: 2017-07-25 | End: 2017-12-14

## 2017-07-18 RX ORDER — DICLOFENAC SODIUM 1 MG/ML
1 SOLUTION/ DROPS OPHTHALMIC 3 TIMES DAILY
Qty: 5 ML | Refills: 0 | Status: SHIPPED | OUTPATIENT
Start: 2017-07-25 | End: 2017-12-14

## 2017-07-18 RX ORDER — OFLOXACIN 3 MG/ML
1 SOLUTION/ DROPS OPHTHALMIC 3 TIMES DAILY
Qty: 1 BOTTLE | Refills: 0 | Status: SHIPPED | OUTPATIENT
Start: 2017-07-23 | End: 2017-12-14

## 2017-07-18 RX ORDER — MOXIFLOXACIN HCL 0.5 %
1 DROPS OPHTHALMIC (EYE) 3 TIMES DAILY
Qty: 2.5 ML | Refills: 0 | Status: SHIPPED | OUTPATIENT
Start: 2017-07-18 | End: 2017-07-25

## 2017-07-18 ASSESSMENT — SLIT LAMP EXAM - LIDS
COMMENTS: 2+ DERMATOCHALASIS - UPPER LID
COMMENTS: 2+ DERMATOCHALASIS - UPPER LID

## 2017-07-18 ASSESSMENT — VISUAL ACUITY
METHOD: SNELLEN - LINEAR
OD_CC: 20/40-2

## 2017-07-18 ASSESSMENT — PAIN SCALES - GENERAL: PAINLEVEL: NO PAIN (0)

## 2017-07-18 ASSESSMENT — EXTERNAL EXAM - LEFT EYE: OS_EXAM: MILD BROW, PROLAPSED FAT PADS: UPPER, LOWER

## 2017-07-18 ASSESSMENT — EXTERNAL EXAM - RIGHT EYE: OD_EXAM: MILD BROW, PROLAPSED FAT PADS: UPPER, LOWER

## 2017-07-18 NOTE — PROGRESS NOTES
Current Eye Medications:  Tears daily     Subjective:  preop kpe od  As above 07/24/2017.     Objective:  See Ophthalmology Exam.       Assessment: Visually significant cataract right eye.       Plan: Plan Kelman phacoemulsification/ posterior chamber lens right eye local standby.  Risks, benefits, complications, and alternatives discussed with patient including possibility of limitations from coexistent eye disease and loss of vision.  Target refraction and multifocal lens options discussed.  Patient understands and consents.  Ifeanyi Rosario M.D.

## 2017-07-18 NOTE — PATIENT INSTRUCTIONS
PRE-OP CATARACT INSTRUCTIONS    *You will be picking up 3 eye drop bottles from your pharmacy.    *Use the following drops in the right eye  3 times a day the day before surgery and once the morning of surgery:                                    Vigamox (tan cap)    *If taking more than one drop, wait five minutes between drops.    *No solid food after midnight.    *Clear liquids: coffee (no cream), tea, water, and jello are OK before 8 A.M.  You may take your regular pills with this.    *If you are taking glaucoma drops, continue as usual.    Ifeanyi Rosario M.D.

## 2017-07-18 NOTE — MR AVS SNAPSHOT
After Visit Summary   7/18/2017    Raeann Abdalla    MRN: [id number]           Patient Information     Date Of Birth          1935        Visit Information        Provider Department      7/18/2017 12:00 PM Luz Coughlin APRN [facility]        Today's Diagnoses     Preop general physical exam    -  1    Cataract of right eye, unspecified cataract type        Hyperlipidemia LDL goal <70        CKD (chronic kidney disease) stage 3, GFR 30-59 ml/min          Care Instructions    Hold aspirin 7 days prior to surgery.  Okay to take your other medications with small sips of water prior to surgery.    Before Your Surgery      Call your surgeon if there is any change in your health. This includes signs of a cold or flu (such as a sore throat, runny nose, cough, rash or fever).    Do not smoke, drink alcohol or take over the counter medicine (unless your surgeon or primary care doctor tells you to) for the 24 hours before and after surgery.    If you take prescribed drugs: Follow your doctor s orders about which medicines to take and which to stop until after surgery.    Eating and drinking prior to surgery: follow the instructions from your surgeon    Take a shower or bath the night before surgery. Use the soap your surgeon gave you to gently clean your skin. If you do not have soap from your surgeon, use your regular soap. Do not shave or scrub the surgery site.  Wear clean pajamas and have clean sheets on your bed.   [facility]    If you have any questions regarding to your visit please contact your care team:     Team Pink:   Clinic Hours Telephone Number   Internal Medicine:  Dr. Yissel Coughlin NP       7am-7pm  Monday - Thursday   7am-5pm  Fridays  [phone]  (Appointment scheduling available 24/7)    Questions about your visit?  Team Line  [phone]   Urgent Care - [place] and [place] -  11am-9pm Monday-Friday Saturday-Sunday- 9am-5pm   [place] - 5pm-9pm Monday-Friday Saturday-Sunday- 9am-5pm  [phone] - [place]   [phone] - [place]       What options do I have for visits at the clinic other than the traditional office visit?  To expand how we care for you, many of our providers are utilizing electronic visits (e-visits) and telephone visits, when medically appropriate, for interactions with their patients rather than a visit in the clinic.   We also offer nurse visits for many medical concerns. Just like any other service, we will bill your insurance company for this type of visit based on time spent on the phone with your provider. Not all insurance companies cover these visits. Please check with your medical insurance if this type of visit is covered. You will be responsible for any charges that are not paid by your insurance.      E-visits via [employer]:  generally incur a $35.00 fee.  Telephone visits:  Time spent on the phone: *charged based on time that is spent on the phone in increments of 10 minutes. Estimated cost:   5-10 mins $30.00   11-20 mins. $59.00   21-30 mins. $85.00   Use [employer] (secure email communication and access to your chart) to send your primary care provider a message or make an appointment. Ask someone on your Team how to sign up for [employer].    For a Price Quote for your services, please call our Consumer Price Line at [phone].    As always, Thank you for trusting us with your health care needs!        ROBERT/MA              Follow-ups after your visit        Your next 10 appointments already scheduled     Jul 18, 2017  1:30 PM CDT   Return Visit with Ifeanyi Rosario MD   [facility]    [address]  [place] [address]   [phone]            Jul 25, 2017  3:30 PM CDT   Return Visit with [facility] MD   [facility])    [address]  [place]  "[place] [address]   [phone]            Aug 01, 2017  1:45 PM CDT   Return Visit with Ifeanyi Rosario MD   [facility]    [address]  [place] [address]   [phone]            Aug 30, 2017 11:00 AM CDT   Return Visit with Ifeanyi Rosario MD   [facility] ([address]  [place] [address]   [phone]              Who to contact     If you have questions or need follow up information about today's clinic visit or your schedule please contact [facility] directly at [phone].  Normal or non-critical lab and imaging results will be communicated to you by [employer], letter or phone within 4 business days after the clinic has received the results. If you do not hear from us within 7 days, please contact the clinic through [employer] or phone. If you have a critical or abnormal lab result, we will notify you by phone as soon as possible.  Submit refill requests through [employer] or call your pharmacy and they will forward the refill request to us. Please allow 3 business days for your refill to be completed.          Additional Information About Your Visit        [employer] Information     [employer] gives you secure access to your electronic health record. If you see a primary care provider, you can also send messages to your care team and make appointments. If you have questions, please call your primary care clinic.  If you do not have a primary care provider, please call [phone] and they will assist you.        Care EveryWhere ID     This is your Care EveryWhere ID. This could be used by other organizations to access your [place] medical records  [id number]        Your Vitals Were     Pulse Temperature Height Pulse Oximetry Breastfeeding? BMI (Body Mass Index)    68 97.4  F (36.3  C) (Oral) 5' 1.5\" (1.562 m) 98% No 32.53 kg/m2       Blood Pressure from Last 3 Encounters:   07/18/17 124/70 "   02/24/17 116/60   06/30/16 124/70    Weight from Last 3 Encounters:   07/18/17 175 lb (79.4 kg)   02/24/17 173 lb 6.4 oz (78.7 kg)   06/30/16 168 lb (76.2 kg)              Today, you had the following     No orders found for display       Primary Care Provider Office Phone # Fax #    STAR Quezada [phone]       [address] [address]        Equal Access to Services     [facility]: Hadii aad ku hadasho Soomaali, waaxda luqadaha, qaybta kaalmada adeegyada, waxvijay castañeda haymaggi ash . So [facility] [phone].    ATENCIÓN: Si habla español, tiene a sharma disposición servicios gratuitos de asistencia lingüística. [facility] [phone].    We comply with applicable federal civil rights laws and [place] laws. We do not discriminate on the basis of race, color, national origin, age, disability sex, sexual orientation or gender identity.            Thank you!     Thank you for choosing [facility]  for your care. Our goal is always to provide you with excellent care. Hearing back from our patients is one way we can continue to improve our services. Please take a few minutes to complete the written survey that you may receive in the mail after your visit with us. Thank you!             Your Updated Medication List - Protect others around you: Learn how to safely use, store and throw away your medicines at www.disposemymeds.org.          This list is accurate as of: 7/18/17 12:27 PM.  Always use your most recent med list.                   Brand Name Dispense Instructions for use Diagnosis    aspirin 325 MG tablet      Take 325 mg by mouth daily Patient takes 162.5 mg        carboxymethylcellulose 0.5 % Soln ophthalmic solution    REFRESH PLUS     Place 1 drop into both eyes daily as needed for dry eyes        lisinopril 5 MG tablet    PRINIVIL/ZESTRIL    90 tablet    Take 1 tablet (5 mg) by mouth daily Please  schedule follow up visit    Benign essential hypertension       metoprolol 25 MG tablet    LOPRESSOR    180 tablet    Take 1 tablet (25 mg) by mouth 2 times daily    Benign essential hypertension       MULTI VITAMIN/MINERALS PO      Take 1 tablet by mouth once.        SIMVASTATIN PO      Take 20 mg by mouth At Bedtime

## 2017-07-18 NOTE — NURSING NOTE
"Chief Complaint   Patient presents with     Pre-Op Exam     7/24 Dr. Rosario       Initial /70  Pulse 68  Temp 97.4  F (36.3  C) (Oral)  Ht 5' 1.5\" (1.562 m)  Wt 175 lb (79.4 kg)  SpO2 98%  Breastfeeding? No  BMI 32.53 kg/m2 Estimated body mass index is 32.53 kg/(m^2) as calculated from the following:    Height as of this encounter: 5' 1.5\" (1.562 m).    Weight as of this encounter: 175 lb (79.4 kg).  Medication Reconciliation: complete   ROBERT/MA      "

## 2017-07-18 NOTE — MR AVS SNAPSHOT
After Visit Summary   7/18/2017    Raeann Abdalla    MRN: [id number]           Patient Information     Date Of Birth          1935        Visit Information        Provider Department      7/18/2017 1:30 PM Ifeanyi Rosario MD [facility] [place]        Today's Diagnoses     Combined forms of age-related cataract , mod ou     -  1      Care Instructions    PRE-OP CATARACT INSTRUCTIONS    *You will be picking up 3 eye drop bottles from your pharmacy.    *Use the following drops in the right eye  3 times a day the day before surgery and once the morning of surgery:                                    Vigamox (tan cap)    *If taking more than one drop, wait five minutes between drops.    *No solid food after midnight.    *Clear liquids: coffee (no cream), tea, water, and jello are OK before 8 A.M.  You may take your regular pills with this.    *If you are taking glaucoma drops, continue as usual.    Ifeanyi Rosario M.D.            Follow-ups after your visit        Your next 10 appointments already scheduled     Jul 25, 2017  3:30 PM CDT   [facility] [place] [facility]    [address] [place] [address]   [phone]            Aug 01, 2017  1:45 PM CDT   [facility] [place] [facility]    [address] [place] [address]   [phone]            Aug 30, 2017 11:00 AM CDT   Return Visit with Ifeanyi Rosario MD   [facility] [place] ([address] [address]   [phone]              Who to contact     If you have questions or need follow up information about today's clinic visit or your schedule please contact [place] [facility] directly at [phone].  Normal or non-critical lab and imaging results will be communicated to you by MyChart, letter or phone within 4 business days after  the clinic has received the results. If you do not hear from us within 7 days, please contact the clinic through Codew[employer] or phone. If you have a critical or abnormal lab result, we will notify you by phone as soon as possible.  Submit refill requests through Codewars or call your pharmacy and they will forward the refill request to us. Please allow 3 business days for your refill to be completed.          Additional Information About Your Visit        B[employer] Information     Codewars gives you secure access to your electronic health record. If you see a primary care provider, you can also send messages to your care team and make appointments. If you have questions, please call your primary care clinic.  If you do not have a primary care provider, please call [phone] and they will assist you.        Care EveryWhere ID     This is your Care EveryWhere ID. This could be used by other organizations to access your [place] medical records  [id number]         Blood Pressure from Last 3 Encounters:   07/18/17 124/70   02/24/17 116/60   06/30/16 124/70    Weight from Last 3 Encounters:   07/18/17 79.4 kg (175 lb)   02/24/17 78.7 kg (173 lb 6.4 oz)   06/30/16 76.2 kg (168 lb)              We Performed the Following     IOL MASTER (1st eye)     IOL MASTER (both eyes)          Today's Medication Changes          These changes are accurate as of: 7/18/17  2:41 PM.  If you have any questions, ask your nurse or doctor.               Start taking these medicines.        Dose/Directions    diclofenac 0.1 % ophthalmic solution   Commonly known as:  VOLTAREN   Used for:  Combined forms of age-related cataract of both eyes   Started by:  Ifeanyi Rosario MD        Dose:  1 drop   Start taking on:  7/25/2017   Place 1 drop into the right eye 3 times daily   Quantity:  5 mL   Refills:  0       prednisoLONE acetate 1 % ophthalmic susp   Commonly known as:  PRED FORTE   Used for:  Combined forms of age-related cataract of both eyes    Started by:  Ifeanyi Rosario MD        Dose:  1 drop   Start taking on:  7/25/2017   Place 1 drop into the right eye 3 times daily   Quantity:  5 mL   Refills:  0       VIGAMOX 0.5 % ophthalmic solution   Used for:  Combined forms of age-related cataract of both eyes   Generic drug:  moxifloxacin   Started by:  Ifeanyi Rosario MD        Dose:  1 drop   Place 1 drop into the right eye 3 times daily   Quantity:  2.5 mL   Refills:  0            Where to get your medicines      These medications were sent to [facility] [address] [address] [facility]  [address] [facility] [address]     Phone:  [phone]     diclofenac 0.1 % ophthalmic solution    prednisoLONE acetate 1 % ophthalmic susp    VIGAMOX 0.5 % ophthalmic solution                Primary Care Provider Office Phone # Fax #    Luz Coughlin, STAR [facility] [phone]       [address] [address]        Equal Access to Services     [facility] HospitalFAB[facility] AH: Hadii aad ku hadasho Soomaali, waaxda luqadaha, qaybta kaalmada adeegyada, waxay idiin hayaan darren kharash mihir . So [facility] [phone].    ATENCIÓN: Si habla español, tiene a sharma disposición servicios gratuitos de asistencia lingüística. Llame al [phone].    We comply with applicable federal civil rights laws and [place] laws. We do not discriminate on the basis of race, color, national origin, age, disability sex, sexual orientation or gender identity.            Thank you!     Thank you for choosing [facility]  for your care. Our goal is always to provide you with excellent care. Hearing back from our patients is one way we can continue to improve our services. Please take a few minutes to complete the written survey that you may receive in the mail after your visit with us. Thank you!             Your Updated Medication List - Protect others around you: Learn how to safely use, store and  throw away your medicines at www.disposemymeds.org.          This list is accurate as of: 7/18/17  2:41 PM.  Always use your most recent med list.                   Brand Name Dispense Instructions for use Diagnosis    aspirin 325 MG tablet      Take 325 mg by mouth daily Patient takes 162.5 mg        carboxymethylcellulose 0.5 % Soln ophthalmic solution    REFRESH PLUS     Place 1 drop into both eyes daily as needed for dry eyes        diclofenac 0.1 % ophthalmic solution   Start taking on:  7/25/2017    VOLTAREN    5 mL    Place 1 drop into the right eye 3 times daily    Combined forms of age-related cataract of both eyes       lisinopril 5 MG tablet    PRINIVIL/ZESTRIL    90 tablet    Take 1 tablet (5 mg) by mouth daily Please schedule follow up visit    Benign essential hypertension       metoprolol 25 MG tablet    LOPRESSOR    180 tablet    Take 1 tablet (25 mg) by mouth 2 times daily    Benign essential hypertension       MULTI VITAMIN/MINERALS PO      Take 1 tablet by mouth once.        prednisoLONE acetate 1 % ophthalmic susp   Start taking on:  7/25/2017    PRED FORTE    5 mL    Place 1 drop into the right eye 3 times daily    Combined forms of age-related cataract of both eyes       SIMVASTATIN PO      Take 20 mg by mouth At Bedtime        VIGAMOX 0.5 % ophthalmic solution   Generic drug:  moxifloxacin     2.5 mL    Place 1 drop into the right eye 3 times daily    Combined forms of age-related cataract of both eyes

## 2017-07-19 ENCOUNTER — MYC MEDICAL ADVICE (OUTPATIENT)
Dept: FAMILY MEDICINE | Facility: CLINIC | Age: 82
End: 2017-07-19

## 2017-07-19 DIAGNOSIS — I10 BENIGN ESSENTIAL HYPERTENSION: ICD-10-CM

## 2017-07-19 NOTE — PROGRESS NOTES
Please call patient-    Her potassium is mildly elevated.  I would like her to decrease her lisinopril dose to 2.5 mg daily and repeat bmp and ancillary blood pressure check in 2 weeks (indication hyperkalemia, hypertension)  Her cholesterol is okay.    Thanks,  Luz Coughlin, CNP

## 2017-07-20 RX ORDER — LISINOPRIL 2.5 MG/1
2.5 TABLET ORAL DAILY
Qty: 90 TABLET | Refills: 0 | Status: SHIPPED | OUTPATIENT
Start: 2017-07-20 | End: 2017-10-14

## 2017-07-20 NOTE — TELEPHONE ENCOUNTER
New prescription sent.  BMP lab order has been placed.  EarthLink message sent    Kayleen Bustos RN

## 2017-07-20 NOTE — TELEPHONE ENCOUNTER
See 7/18/17 result notes for further info.    Notes Recorded by Luz Coughlin APRN CNP on 7/19/2017 at 3:27 PM  Please call patient-    Her potassium is mildly elevated.  I would like her to decrease her lisinopril dose to 2.5 mg daily and repeat bmp and ancillary blood pressure check in 2 weeks (indication hyperkalemia, hypertension)  Her cholesterol is okay.    Thanks,  Luz Coughlin, ANN Bustos, RN

## 2017-07-25 ENCOUNTER — OFFICE VISIT (OUTPATIENT)
Dept: OPHTHALMOLOGY | Facility: CLINIC | Age: 82
End: 2017-07-25
Payer: MEDICARE

## 2017-07-25 DIAGNOSIS — Z96.1 PSEUDOPHAKIA: Primary | ICD-10-CM

## 2017-07-25 PROBLEM — H25.813 COMBINED FORMS OF AGE-RELATED CATARACT OF BOTH EYES: Status: RESOLVED | Noted: 2017-07-18 | Resolved: 2017-07-25

## 2017-07-25 PROBLEM — H25.812 COMBINED FORMS OF AGE-RELATED CATARACT OF LEFT EYE: Status: ACTIVE | Noted: 2017-07-25

## 2017-07-25 PROCEDURE — 99024 POSTOP FOLLOW-UP VISIT: CPT | Performed by: OPHTHALMOLOGY

## 2017-07-25 ASSESSMENT — EXTERNAL EXAM - RIGHT EYE: OD_EXAM: NORMAL

## 2017-07-25 ASSESSMENT — TONOMETRY
IOP_METHOD: APPLANATION
OD_IOP_MMHG: 17

## 2017-07-25 ASSESSMENT — SLIT LAMP EXAM - LIDS: COMMENTS: NORMAL

## 2017-07-25 ASSESSMENT — VISUAL ACUITY
OD_SC+: -2
METHOD: SNELLEN - LINEAR
OD_PH_SC: 20/30-2
OD_SC: 20/60

## 2017-07-25 NOTE — MR AVS SNAPSHOT
After Visit Summary   7/25/2017    Raeann Abdalla    MRN: [id number]           Patient Information     Date Of Birth          1935        Visit Information        Provider Department      7/25/2017 3:30 PM Ifeanyi Rosario MD [facility]        Today's Diagnoses     Pseudophakia, od    -  1      Care Instructions    POST-OP CATARACT INSTRUCTIONS    Use the following drops in the right eye:    Ocuflox(tan cap) 3 times a day through end of Saturday, then put remainder in fridge.    Continue:  Prednisolone (pink or white cap) 3 times a day  Diclofenac (gray cap) 3 times a day    ~Wait at least 5 minutes between drops.    ~Wear eye shield at night for one week.    ~Do not exert yourself to the point that you are red in the face for one week.    ~If your vision worsens, eye becomes increasingly red, or becomes painful, call [phone].    ~If you are taking glaucoma medications, continue as usual.    ~OK to resume aspirin and/or other blood thinners.    Ifeanyi Rosario M.D.              Follow-ups after your visit        Your next 10 appointments already scheduled     Aug 01, 2017  1:45 PM CDT   Return Visit with Ifeanyi Rosario MD   [facility] ([facility]    [address] [facility] [facility] [facility] [address] [phone]            Aug 15, 2017  2:00 PM CDT   LAB with  LAB   [facility] ([facility]    [address] [facility] [address]   [phone]           Patient must bring picture ID.  Patient should be prepared to give a urine specimen  Please do not eat 10-12 hours before your appointment if you are coming in fasting for labs on lipids, cholesterol, or glucose (sugar).  Pregnant women should follow their Care Team instructions. Water with medications is okay. Do not drink coffee or other fluids.   If you have concerns about taking  your medications, please ask at office or if scheduling via [facility], send a  message by clicking on Secure Messaging, Message Your Care Team.            Aug 30, 2017 11:00 AM CDT   [facility]    [address] [facility]  [place] [address] [phone]              Who to contact     If you have questions or need follow up information about today's clinic visit or your schedule please contact [facility] directly at [phone].  Normal or non-critical lab and imaging results will be communicated to you by [employer], letter or phone within 4 business days after the clinic has received the results. If you do not hear from us within 7 days, please contact the clinic through [employer] or phone. If you have a critical or abnormal lab result, we will notify you by phone as soon as possible.  Submit refill requests through [employer] or call your pharmacy and they will forward the refill request to us. Please allow 3 business days for your refill to be completed.          Additional Information About Your Visit        [employer] Information     [employer] gives you secure access to your electronic health record. If you see a primary care provider, you can also send messages to your care team and make appointments. If you have questions, please call your primary care clinic.  If you do not have a primary care provider, please call [phone] and they will assist you.        Care EveryWhere ID     This is your Care EveryWhere ID. This could be used by other organizations to access your [place] medical records  [id number]         Blood Pressure from Last 3 Encounters:   07/18/17 124/70   02/24/17 116/60   06/30/16 124/70    Weight from Last 3 Encounters:   07/18/17 79.4 kg (175 lb)   02/24/17 78.7 kg (173 lb 6.4 oz)   06/30/16 76.2 kg (168 lb)              Today, you had the following     No orders found for display       Primary Care Provider Office Phone # Fax #    STAR Quezada [phone]  [phone]       [facility] [address] [place] [address]        Equal Access to Services     FERNANDA MARTÍNEZ : Hadii joni miller min Guajardo, wadanielda luqalexandra, qaybta kaalmada maría, dorothy betancourtomari deb. So [place] [phone].    ATENCIÓN: Si habla español, tiene a sharma disposición servicios gratuitos de asistencia lingüística. Llame al [phone].    We comply with applicable federal civil rights laws and [place] laws. We do not discriminate on the basis of race, color, national origin, age, disability sex, sexual orientation or gender identity.            Thank you!     Thank you for choosing [facility]  for your care. Our goal is always to provide you with excellent care. Hearing back from our patients is one way we can continue to improve our services. Please take a few minutes to complete the written survey that you may receive in the mail after your visit with us. Thank you!             Your Updated Medication List - Protect others around you: Learn how to safely use, store and throw away your medicines at www.disposemymeds.org.          This list is accurate as of: 7/25/17  4:07 PM.  Always use your most recent med list.                   Brand Name Dispense Instructions for use Diagnosis    aspirin 325 MG tablet      Take 325 mg by mouth daily Patient takes 162.5 mg        carboxymethylcellulose 0.5 % Soln ophthalmic solution    REFRESH PLUS     Place 1 drop into both eyes daily as needed for dry eyes        diclofenac 0.1 % ophthalmic solution    VOLTAREN    5 mL    Place 1 drop into the right eye 3 times daily    Combined forms of age-related cataract of both eyes       lisinopril 2.5 MG tablet    PRINIVIL/Zestril    90 tablet    Take 1 tablet (2.5 mg) by mouth daily    Benign essential hypertension       metoprolol 25 MG tablet    LOPRESSOR    180 tablet    Take 1 tablet (25 mg) by mouth 2 times daily    Benign essential hypertension        MULTI VITAMIN/MINERALS PO      Take 1 tablet by mouth once.        ofloxacin 0.3 % ophthalmic solution    OCUFLOX    1 Bottle    Place 1 drop into the right eye 3 times daily    Combined forms of age-related cataract of both eyes       prednisoLONE acetate 1 % ophthalmic susp    PRED FORTE    5 mL    Place 1 drop into the right eye 3 times daily    Combined forms of age-related cataract of both eyes       SIMVASTATIN PO      Take 20 mg by mouth At Bedtime

## 2017-07-25 NOTE — PATIENT INSTRUCTIONS
POST-OP CATARACT INSTRUCTIONS    Use the following drops in the right eye:    Ocuflox(tan cap) 3 times a day through end of Saturday, then put remainder in fridge.    Continue:  Prednisolone (pink or white cap) 3 times a day  Diclofenac (gray cap) 3 times a day    ~Wait at least 5 minutes between drops.    ~Wear eye shield at night for one week.    ~Do not exert yourself to the point that you are red in the face for one week.    ~If your vision worsens, eye becomes increasingly red, or becomes painful, call [phone].    ~If you are taking glaucoma medications, continue as usual.    ~OK to resume aspirin and/or other blood thinners.    Ifeanyi Rosario M.D.

## 2017-08-01 ENCOUNTER — OFFICE VISIT (OUTPATIENT)
Dept: OPHTHALMOLOGY | Facility: CLINIC | Age: 82
End: 2017-08-01
Payer: MEDICARE

## 2017-08-01 DIAGNOSIS — H25.812 COMBINED FORMS OF AGE-RELATED CATARACT OF LEFT EYE: ICD-10-CM

## 2017-08-01 DIAGNOSIS — Z96.1 PSEUDOPHAKIA: Primary | ICD-10-CM

## 2017-08-01 PROCEDURE — 99024 POSTOP FOLLOW-UP VISIT: CPT | Performed by: OPHTHALMOLOGY

## 2017-08-01 ASSESSMENT — TONOMETRY
OD_IOP_MMHG: 15
IOP_METHOD: APPLANATION

## 2017-08-01 ASSESSMENT — REFRACTION_MANIFEST
OD_AXIS: 122
OD_CYLINDER: +2.00
OD_SPHERE: -2.00

## 2017-08-01 ASSESSMENT — VISUAL ACUITY
OD_SC: 20/50
OD_PH_SC: 25+2
METHOD: SNELLEN - LINEAR

## 2017-08-01 ASSESSMENT — SLIT LAMP EXAM - LIDS: COMMENTS: NORMAL

## 2017-08-01 NOTE — MR AVS SNAPSHOT
After Visit Summary   8/1/2017    Raeann Abdalla    MRN: [id number]           Patient Information     Date Of Birth          1935        Visit Information        Provider Department      8/1/2017 1:45 PM Ifeanyi Rosario MD [facility]        Care Instructions    1)   Continue Diclofenc (gray) and prednisolone (pink) three times daily until each is gone.    2)   Stop shield one week following surgery.    3)   No eye rubbing.    4)   Cataract surgery left eye is scheduled for Wednesday August 16th @ 1PM    Ifeanyi Rosario M.D.            Follow-ups after your visit        Your next 10 appointments already scheduled     Aug 15, 2017  2:00 PM CDT   LAB with  LAB   [facility] ([facility])    [address] [place] [id number] [phone]           Patient must bring picture ID. Patient should be prepared to give a urine specimen  Please do not eat 10-12 hours before your appointment if you are coming in fasting for labs on lipids, cholesterol, or glucose (sugar). Pregnant women should follow their Care Team instructions. Water with medications is okay. Do not drink coffee or other fluids. If you have concerns about taking  your medications, please ask at office or if scheduling via [employer], send a message by clicking on Secure Messaging, Message Your Care Team.            Aug 30, 2017 11:00 AM CDT   Return Visit with Ifeanyi Rosario MD   [facility] [place] ([facility])    [address] [place] [id number] [phone]              Who to contact     If you have questions or need follow up information about today's clinic visit or your schedule please contact [facility] directly at [phone].  Normal or non-critical lab and imaging results will be communicated to you by MyChart, letter or phone within 4 business days after the clinic has received the results. If you do not hear from us within 7  days, please contact the clinic through Gema or phone. If you have a critical or abnormal lab result, we will notify you by phone as soon as possible.  Submit refill requests through Gema or call your pharmacy and they will forward the refill request to us. Please allow 3 business days for your refill to be completed.          Additional Information About Your Visit        [employer] Information     Gema gives you secure access to your electronic health record. If you see a primary care provider, you can also send messages to your care team and make appointments. If you have questions, please call your primary care clinic.  If you do not have a primary care provider, please call [phone] and they will assist you.        Care EveryWhere ID     This is your Care EveryWhere ID. This could be used by other organizations to access your [place] medical records  [id number]         Blood Pressure from Last 3 Encounters:   07/18/17 124/70   02/24/17 116/60   06/30/16 124/70    Weight from Last 3 Encounters:   07/18/17 79.4 kg (175 lb)   02/24/17 78.7 kg (173 lb 6.4 oz)   06/30/16 76.2 kg (168 lb)              Today, you had the following     No orders found for display       Primary Care Provider Office Phone # Fax #    Luz STAR [facility] [phone]       [address] [address]        Equal Access to Services     [facility] : Hadii aad ku hadasho Soomaali, waaxda luqadaha, qaybta kaalmada adeegyada, waxvijay holderin hayaan darren ash . So [facility] [phone].    ATENCIÓN: Si habla español, tiene a sharma disposición servicios gratuitos de asistencia lingüística. Ashly al [phone].    We comply with applicable federal civil rights laws and [place] laws. We do not discriminate on the basis of race, color, national origin, age, disability sex, sexual orientation or gender identity.            Thank you!     Thank you for choosing  [facility] [place]  for your care. Our goal is always to provide you with excellent care. Hearing back from our patients is one way we can continue to improve our services. Please take a few minutes to complete the written survey that you may receive in the mail after your visit with us. Thank you!             Your Updated Medication List - Protect others around you: Learn how to safely use, store and throw away your medicines at www.disposemymeds.org.          This list is accurate as of: 8/1/17  2:25 PM.  Always use your most recent med list.                   Brand Name Dispense Instructions for use Diagnosis    aspirin 325 MG tablet      Take 325 mg by mouth daily Patient takes 162.5 mg        carboxymethylcellulose 0.5 % Soln ophthalmic solution    REFRESH PLUS     Place 1 drop into both eyes daily as needed for dry eyes        diclofenac 0.1 % ophthalmic solution    VOLTAREN    5 mL    Place 1 drop into the right eye 3 times daily    Combined forms of age-related cataract of both eyes       lisinopril 2.5 MG tablet    PRINIVIL/Zestril    90 tablet    Take 1 tablet (2.5 mg) by mouth daily    Benign essential hypertension       metoprolol 25 MG tablet    LOPRESSOR    180 tablet    Take 1 tablet (25 mg) by mouth 2 times daily    Benign essential hypertension       MULTI VITAMIN/MINERALS PO      Take 1 tablet by mouth once.        ofloxacin 0.3 % ophthalmic solution    OCUFLOX    1 Bottle    Place 1 drop into the right eye 3 times daily    Combined forms of age-related cataract of both eyes       prednisoLONE acetate 1 % ophthalmic susp    PRED FORTE    5 mL    Place 1 drop into the right eye 3 times daily    Combined forms of age-related cataract of both eyes       SIMVASTATIN PO      Take 20 mg by mouth At Bedtime

## 2017-08-01 NOTE — PROGRESS NOTES
Current Eye Medications:  Diclofenac and Prednisolone 1% right eye three times a day.      Subjective:  Status/Post Kelman Phacoemulsification with Posterior Chamber Lens right eye:  7-24-17.  Vision is improving and right eye is comfortable.      Objective:  See Ophthalmology Exam.       Assessment: Doing well status/post Kelman phacoemulsification/ posterior chamber lens right eye.    Visually significant cataract left eye.      Plan:   See Patient Instructions.

## 2017-08-01 NOTE — PATIENT INSTRUCTIONS
1)   Continue Diclofenc (gray) and prednisolone (pink) three times daily until each is gone.    2)   Stop shield one week following surgery.    3)   No eye rubbing.    4)   Cataract surgery left eye is scheduled for Wednesday August 16th @ 1PM    Ifeanyi Rosario M.D.

## 2017-08-14 ENCOUNTER — OFFICE VISIT (OUTPATIENT)
Dept: OPHTHALMOLOGY | Facility: CLINIC | Age: 82
End: 2017-08-14
Payer: MEDICARE

## 2017-08-14 DIAGNOSIS — Z96.1 PSEUDOPHAKIA: Primary | ICD-10-CM

## 2017-08-14 DIAGNOSIS — H25.812 COMBINED FORMS OF AGE-RELATED CATARACT OF LEFT EYE: ICD-10-CM

## 2017-08-14 PROCEDURE — 92136 OPHTHALMIC BIOMETRY: CPT | Mod: 26 | Performed by: OPHTHALMOLOGY

## 2017-08-14 PROCEDURE — 99024 POSTOP FOLLOW-UP VISIT: CPT | Performed by: OPHTHALMOLOGY

## 2017-08-14 RX ORDER — PREDNISOLONE ACETATE 10 MG/ML
1 SUSPENSION/ DROPS OPHTHALMIC 3 TIMES DAILY
Qty: 5 ML | Refills: 0 | Status: CANCELLED | OUTPATIENT
Start: 2017-08-17

## 2017-08-14 RX ORDER — DICLOFENAC SODIUM 1 MG/ML
1 SOLUTION/ DROPS OPHTHALMIC 3 TIMES DAILY
Qty: 5 ML | Refills: 0 | Status: CANCELLED | OUTPATIENT
Start: 2017-08-17

## 2017-08-14 ASSESSMENT — SLIT LAMP EXAM - LIDS
COMMENTS: NORMAL
COMMENTS: 2+ DERMATOCHALASIS - UPPER LID

## 2017-08-14 ASSESSMENT — VISUAL ACUITY
OS_SC: 20/70
OS_PH_SC: 50-1
OD_SC: 20/30
METHOD: SNELLEN - LINEAR

## 2017-08-14 ASSESSMENT — REFRACTION_MANIFEST
OD_SPHERE: -0.75
OD_CYLINDER: +0.50
OD_AXIS: 118

## 2017-08-14 ASSESSMENT — EXTERNAL EXAM - LEFT EYE: OS_EXAM: MILD BROW, PROLAPSED FAT PADS: UPPER, LOWER

## 2017-08-14 ASSESSMENT — EXTERNAL EXAM - RIGHT EYE: OD_EXAM: MILD BROW, PROLAPSED FAT PADS: UPPER, LOWER

## 2017-08-14 NOTE — PATIENT INSTRUCTIONS
PRE-OP CATARACT INSTRUCTIONS FOR THE LEFT EYE    *Use the following drops in the left eye 3 times a day the day before surgery and once the morning of surgery:                                    Ofloxacin     *If taking more than one drop, wait five minutes between drops.    *No solid food after midnight.    *Clear liquids: coffee (no cream), tea, water, and jello are OK before 7 A.M.  You may take your regular pills with this.    *If you are taking glaucoma drops, continue as usual.    *Continue Prednisolone and Diclofenac three times daily right eye until gone.    Ifeanyi Rosario M.D.

## 2017-08-14 NOTE — PROGRESS NOTES
Current Eye Medications:  Diclofenac and Prednisolone 1% left eye three times a day.      Subjective:  Patient here for pre-op cataract surgery, left eye, scheduled for 8-16-17.  History and Physical done.  Consent signed.   states her vision is much better since her last appointment.  He has unopened bottles of ofloxacin, diclofenac, and Prednisolone 1% at home so he doesn't want any additional drops ordered today.       Objective:  See Ophthalmology Exam.       Assessment:  Doing well status/post Kelman phacoemulsification/ posterior chamber lens right eye.  Visually significant cataract left eye       Plan: Plan Kelman phacoemulsification/ posterior chamber lens left eye local standby.  Risks, benefits, complications, and alternatives discussed with patient including possibility of limitations from coexistent eye disease and loss of vision.  Target refraction and multifocal lens options discussed.  Patient understands and consents.  Ifeanyi Rosario M.D.

## 2017-08-14 NOTE — MR AVS SNAPSHOT
After Visit Summary   8/14/2017    Raeann Abdalla    MRN: [id number]           Patient Information     Date Of Birth          1935        Visit Information        Provider Department      8/14/2017 9:45 AM Ifeanyi Rosario MD [facility]        Today's Diagnoses     Combined forms of age-related cataract, mod, of left eye    -  1      Care Instructions    PRE-OP CATARACT INSTRUCTIONS FOR THE LEFT EYE    *Use the following drops in the left eye 3 times a day the day before surgery and once the morning of surgery:                                    Ofloxacin     *If taking more than one drop, wait five minutes between drops.    *No solid food after midnight.    *Clear liquids: coffee (no cream), tea, water, and jello are OK before 7 A.M.  You may take your regular pills with this.    *If you are taking glaucoma drops, continue as usual.    *Continue Prednisolone and Diclofenac three times daily right eye until gone.    Ifeanyi Rosario M.D.            Follow-ups after your visit        Your next 10 appointments already scheduled     Aug 15, 2017  2:00 PM CDT   LAB with  [facility]  [place] [address] [phone]           Patient must bring picture ID. Patient should be prepared to give a urine specimen  Please do not eat 10-12 hours before your appointment if you are coming in fasting for labs on lipids, cholesterol, or glucose (sugar). Pregnant women should follow their Care Team instructions. Water with medications is okay. Do not drink coffee or other fluids. If you have concerns about taking  your medications, please ask at office or if scheduling via [email or web address], send a message by clicking on Secure Messaging, Message Your Care Team.            Aug 17, 2017 11:30 AM CDT   Return Visit with Ifeanyi Rosario MD   [facility] [place] ([address]  [place]  [address]   [phone]            Aug 24, 2017  7:45 AM CDT   Return Visit with Ifeanyi Rosario MD   [facility] ([facility]    [address] [facility]  [place] [address]   [phone]            Sep 21, 2017 11:00 AM CDT   Return Visit with Ifeanyi Rosario MD   [facility] ([place] [id number] [facility]  [place] [address]   [phone]              Who to contact     If you have questions or need follow up information about today's clinic visit or your schedule please contact [facility] directly at [phone].  Normal or non-critical lab and imaging results will be communicated to you by MyChart, letter or phone within 4 business days after the clinic has received the results. If you do not hear from us within 7 days, please contact the clinic through Locaidhart or phone. If you have a critical or abnormal lab result, we will notify you by phone as soon as possible.  Submit refill requests through DuckHook Media or call your pharmacy and they will forward the refill request to us. Please allow 3 business days for your refill to be completed.          Additional Information About Your Visit        Locaidhart Information     Duck[employer] Media gives you secure access to your electronic health record. If you see a primary care provider, you can also send messages to your care team and make appointments. If you have questions, please call your primary care clinic.  If you do not have a primary care provider, please call [phone] and they will assist you.        Care EveryWhere ID     This is your Care EveryWhere ID. This could be used by other organizations to access your [place] medical records  [id number]         Blood Pressure from Last 3 Encounters:   07/18/17 124/70   02/24/17 116/60   06/30/16 124/70    Weight from Last 3 Encounters:   07/18/17 79.4 kg (175 lb)   02/24/17 78.7 kg (173 lb 6.4 oz)   06/30/16 76.2 kg (168 lb)              We  Performed the Following     IOL MASTER (2nd eye)        Primary Care Provider Office Phone # Fax #    Luz Coughlin, STAR [facility] [phone]       [address] [facility] [address]        Equal Access to Services     JEFFJONATHAN MAURICIO : Hadii aad ku hadashleyo Soomaali, waaxda luqadaha, qaybta kaalmada adeegyada, waxay idiin hayaan adejosh garvey larenetta boyd. So [facility] [phone].    ATENCIÓN: Si habla español, tiene a sharma disposición servicios gratuitos de asistencia lingüística. Llame al [phone].    We comply with applicable federal civil rights laws and [place] laws. We do not discriminate on the basis of race, color, national origin, age, disability sex, sexual orientation or gender identity.            Thank you!     Thank you for choosing [facility]  for your care. Our goal is always to provide you with excellent care. Hearing back from our patients is one way we can continue to improve our services. Please take a few minutes to complete the written survey that you may receive in the mail after your visit with us. Thank you!             Your Updated Medication List - Protect others around you: Learn how to safely use, store and throw away your medicines at www.disposemymeds.org.          This list is accurate as of: 8/14/17 10:57 AM.  Always use your most recent med list.                   Brand Name Dispense Instructions for use Diagnosis    aspirin 325 MG tablet      Take 325 mg by mouth daily Patient takes 162.5 mg        carboxymethylcellulose 0.5 % Soln ophthalmic solution    REFRESH PLUS     Place 1 drop into both eyes daily as needed for dry eyes        diclofenac 0.1 % ophthalmic solution    VOLTAREN    5 mL    Place 1 drop into the right eye 3 times daily    Combined forms of age-related cataract of both eyes       lisinopril 2.5 MG tablet    PRINIVIL/Zestril    90 tablet    Take 1 tablet (2.5 mg) by mouth daily    Benign essential hypertension       metoprolol 25  MG tablet    LOPRESSOR    180 tablet    Take 1 tablet (25 mg) by mouth 2 times daily    Benign essential hypertension       MULTI VITAMIN/MINERALS PO      Take 1 tablet by mouth once.        ofloxacin 0.3 % ophthalmic solution    OCUFLOX    1 Bottle    Place 1 drop into the right eye 3 times daily    Combined forms of age-related cataract of both eyes       prednisoLONE acetate 1 % ophthalmic susp    PRED FORTE    5 mL    Place 1 drop into the right eye 3 times daily    Combined forms of age-related cataract of both eyes       SIMVASTATIN PO      Take 20 mg by mouth At Bedtime

## 2017-08-15 DIAGNOSIS — I10 BENIGN ESSENTIAL HYPERTENSION: ICD-10-CM

## 2017-08-15 DIAGNOSIS — E87.5 HYPERKALEMIA: ICD-10-CM

## 2017-08-15 LAB
ANION GAP SERPL CALCULATED.3IONS-SCNC: 6 MMOL/L (ref 3–14)
BUN SERPL-MCNC: 15 MG/DL (ref 7–30)
CALCIUM SERPL-MCNC: 9.2 MG/DL (ref 8.5–10.1)
CHLORIDE SERPL-SCNC: 100 MMOL/L (ref 94–109)
CO2 SERPL-SCNC: 29 MMOL/L (ref 20–32)
CREAT SERPL-MCNC: 0.97 MG/DL (ref 0.52–1.04)
GFR SERPL CREATININE-BSD FRML MDRD: 55 ML/MIN/1.7M2
GLUCOSE SERPL-MCNC: 118 MG/DL (ref 70–99)
POTASSIUM SERPL-SCNC: 4.3 MMOL/L (ref 3.4–5.3)
SODIUM SERPL-SCNC: 135 MMOL/L (ref 133–144)

## 2017-08-15 PROCEDURE — 80048 BASIC METABOLIC PNL TOTAL CA: CPT | Performed by: NURSE PRACTITIONER

## 2017-08-15 PROCEDURE — 36415 COLL VENOUS BLD VENIPUNCTURE: CPT | Performed by: NURSE PRACTITIONER

## 2017-08-16 NOTE — PROGRESS NOTES
Dear Raeann,    Your recent test results are attached.      Stable kidney function and electrolytes.      If you have any questions please feel free to contact [phone] or myself via J[employer].    Sincerely,  Luz Coughlin, CNP

## 2017-08-17 ENCOUNTER — OFFICE VISIT (OUTPATIENT)
Dept: OPHTHALMOLOGY | Facility: CLINIC | Age: 82
End: 2017-08-17
Payer: MEDICARE

## 2017-08-17 DIAGNOSIS — Z96.1 PSEUDOPHAKIA: Primary | ICD-10-CM

## 2017-08-17 PROBLEM — H25.812 COMBINED FORMS OF AGE-RELATED CATARACT OF LEFT EYE: Status: RESOLVED | Noted: 2017-07-25 | Resolved: 2017-08-17

## 2017-08-17 PROCEDURE — 99024 POSTOP FOLLOW-UP VISIT: CPT | Performed by: OPHTHALMOLOGY

## 2017-08-17 ASSESSMENT — VISUAL ACUITY
OS_PH_SC: 20/30-2
OS_SC+: -2
OS_SC: 20/60
METHOD: SNELLEN - LINEAR

## 2017-08-17 ASSESSMENT — SLIT LAMP EXAM - LIDS: COMMENTS: NORMAL

## 2017-08-17 ASSESSMENT — TONOMETRY: OS_IOP_MMHG: 20

## 2017-08-17 NOTE — PATIENT INSTRUCTIONS
POST-OP CATARACT INSTRUCTIONS    Use the following drops in the left eye:    Vigamox (tan cap) 3 times a day   Prednisolone (pink or white cap) 3 times a day  Diclofenac (gray cap) 3 times a day    ~Wait at least 5 minutes between drops.    ~Wear eye shield at night for one week.    ~Do not exert yourself to the point that you are red in the face for one week.    ~If your vision worsens, eye becomes increasingly red, or becomes painful, call [phone].    ~If you are taking glaucoma medications, continue as usual.    ~OK to resume aspirin and/or other blood thinners.    ~Continue Diclofenac and Prednisolone three times daily right eye until gone.    Ifeanyi Rosario M.D.

## 2017-08-17 NOTE — MR AVS SNAPSHOT
After Visit Summary   8/17/2017    Raeann Abdalla    MRN: [id number]           Patient Information     Date Of Birth          1935        Visit Information        Provider Department      8/17/2017 11:30 AM Ifeanyi Rosario MD [facility] [place]        Today's Diagnoses     Pseudophakia, ou    -  1      Care Instructions    POST-OP CATARACT INSTRUCTIONS    Use the following drops in the left eye:    Vigamox (tan cap) 3 times a day   Prednisolone (pink or white cap) 3 times a day  Diclofenac (gray cap) 3 times a day    ~Wait at least 5 minutes between drops.    ~Wear eye shield at night for one week.    ~Do not exert yourself to the point that you are red in the face for one week.    ~If your vision worsens, eye becomes increasingly red, or becomes painful, call [phone].    ~If you are taking glaucoma medications, continue as usual.    ~OK to resume aspirin and/or other blood thinners.    ~Continue Diclofenac and Prednisolone three times daily right eye until gone.    Ifeanyi Rosario M.D.              Follow-ups after your visit        Your next 10 appointments already scheduled     Aug 24, 2017  7:45 AM CDT   Return Visit with Ifeanyi Rosario MD   [facility] ([facility]    [address] [place] [address]  [place] [address]   [phone]            Sep 21, 2017 11:00 AM CDT   [facility] [place] ([place] [address] [facility] [address]   [phone]              Who to contact     If you have questions or need follow up information about today's clinic visit or your schedule please contact [facility] [place] directly at [phone].  Normal or non-critical lab and imaging results will be communicated to you by MyChart, letter or phone within 4 business days after the clinic has received the results. If you do not hear from us within 7 days, please contact the clinic through  Dragon Portshart or phone. If you have a critical or abnormal lab result, we will notify you by phone as soon as possible.  Submit refill requests through Barafon or call your pharmacy and they will forward the refill request to us. Please allow 3 business days for your refill to be completed.          Additional Information About Your Visit        Dragon Portshart Information     Bar[employer] gives you secure access to your electronic health record. If you see a primary care provider, you can also send messages to your care team and make appointments. If you have questions, please call your primary care clinic.  If you do not have a primary care provider, please call [phone] and they will assist you.        Care EveryWhere ID     This is your Care EveryWhere ID. This could be used by other organizations to access your [place] medical records  [id number]         Blood Pressure from Last 3 Encounters:   07/18/17 124/70   02/24/17 116/60   06/30/16 124/70    Weight from Last 3 Encounters:   07/18/17 79.4 kg (175 lb)   02/24/17 78.7 kg (173 lb 6.4 oz)   06/30/16 76.2 kg (168 lb)              Today, you had the following     No orders found for display       Primary Care Provider Office Phone # Fax #    Luz STAR [facility] [phone]       [address] [facility] [address]        Equal Access to Services     JONATHAN MARTÍNEZ : Hadii aad ku hadasho Soomaali, waaxda luqadaha, qaybta kaalmada adeegyada, dorothy ash . So [facility] [phone].    ATENCIÓN: Si habla español, tiene a sharma disposición servicios gratuitos de asistencia lingüística. Ashly al [phone].    We comply with applicable federal civil rights laws and [place] laws. We do not discriminate on the basis of race, color, national origin, age, disability sex, sexual orientation or gender identity.            Thank you!     Thank you for choosing [facility]  for your care. Our goal is always to provide  you with excellent care. Hearing back from our patients is one way we can continue to improve our services. Please take a few minutes to complete the written survey that you may receive in the mail after your visit with us. Thank you!             Your Updated Medication List - Protect others around you: Learn how to safely use, store and throw away your medicines at www.disposemymeds.org.          This list is accurate as of: 8/17/17 11:47 AM.  Always use your most recent med list.                   Brand Name Dispense Instructions for use Diagnosis    aspirin 325 MG tablet      Take 325 mg by mouth daily Patient takes 162.5 mg        carboxymethylcellulose 0.5 % Soln ophthalmic solution    REFRESH PLUS     Place 1 drop into both eyes daily as needed for dry eyes        diclofenac 0.1 % ophthalmic solution    VOLTAREN    5 mL    Place 1 drop into the right eye 3 times daily    Combined forms of age-related cataract of both eyes       lisinopril 2.5 MG tablet    PRINIVIL/Zestril    90 tablet    Take 1 tablet (2.5 mg) by mouth daily    Benign essential hypertension       metoprolol 25 MG tablet    LOPRESSOR    180 tablet    Take 1 tablet (25 mg) by mouth 2 times daily    Benign essential hypertension       MULTI VITAMIN/MINERALS PO      Take 1 tablet by mouth once.        ofloxacin 0.3 % ophthalmic solution    OCUFLOX    1 Bottle    Place 1 drop into the right eye 3 times daily    Combined forms of age-related cataract of both eyes       prednisoLONE acetate 1 % ophthalmic susp    PRED FORTE    5 mL    Place 1 drop into the right eye 3 times daily    Combined forms of age-related cataract of both eyes       SIMVASTATIN PO      Take 20 mg by mouth At Bedtime

## 2017-08-24 ENCOUNTER — OFFICE VISIT (OUTPATIENT)
Dept: OPHTHALMOLOGY | Facility: CLINIC | Age: 82
End: 2017-08-24
Payer: MEDICARE

## 2017-08-24 DIAGNOSIS — Z96.1 PSEUDOPHAKIA: Primary | ICD-10-CM

## 2017-08-24 PROCEDURE — 99024 POSTOP FOLLOW-UP VISIT: CPT | Performed by: OPHTHALMOLOGY

## 2017-08-24 ASSESSMENT — VISUAL ACUITY
OS_SC+: -1
METHOD_MR: DULL REFLEX
OS_SC: 20/40
METHOD: SNELLEN - LINEAR
OD_SC: 20/30
OD_SC+: -1

## 2017-08-24 ASSESSMENT — SLIT LAMP EXAM - LIDS: COMMENTS: NORMAL

## 2017-08-24 ASSESSMENT — TONOMETRY
IOP_METHOD: APPLANATION
OS_IOP_MMHG: 16

## 2017-08-24 ASSESSMENT — REFRACTION_MANIFEST
OS_SPHERE: -0.75
OS_AXIS: 090
OS_CYLINDER: +0.75

## 2017-08-24 NOTE — PATIENT INSTRUCTIONS
1) Continue all drops three times daily until gone.    2)  Stop using shield after one week one week after surgery.    3)  Return for final exam as scheduled in about one month.    Ifeanyi Rosario M.D.

## 2017-08-24 NOTE — MR AVS SNAPSHOT
After Visit Summary   8/24/2017    Raeann Abdalla    MRN: [id number]           Patient Information     Date Of Birth          1935        Visit Information        Provider Department      8/24/2017 7:45 AM Ifenayi Rosario MD [facility]        Care Instructions    1) Continue all drops three times daily until gone.    2)  Stop using shield after one week one week after surgery.    3)  Return for final exam as scheduled in about one month.    Ifeanyi Rosario M.D.          Follow-ups after your visit        Your next 10 appointments already scheduled     Sep 21, 2017 11:00 AM CDT   Return Visit with Ifeanyi Rosario MD   [facility] ([address] [address] [phone]              Who to contact     If you have questions or need follow up information about today's clinic visit or your schedule please contact [facility] directly at [phone].  Normal or non-critical lab and imaging results will be communicated to you by The Innovation Factoryhart, letter or phone within 4 business days after the clinic has received the results. If you do not hear from us within 7 days, please contact the clinic through Indiewallst or phone. If you have a critical or abnormal lab result, we will notify you by phone as soon as possible.  Submit refill requests through Personal Factory or call your pharmacy and they will forward the refill request to us. Please allow 3 business days for your refill to be completed.          Additional Information About Your Visit        The Innovation Factoryhart Information     Personal [employer] gives you secure access to your electronic health record. If you see a primary care provider, you can also send messages to your care team and make appointments. If you have questions, please call your primary care clinic.  If you do not have a primary care provider, please call [phone] and they will assist you.        Care EveryWhere ID     This is  your Care EveryWhere ID. This could be used by other organizations to access your [place] medical records  [id number]         Blood Pressure from Last 3 Encounters:   07/18/17 124/70   02/24/17 116/60   06/30/16 124/70    Weight from Last 3 Encounters:   07/18/17 79.4 kg (175 lb)   02/24/17 78.7 kg (173 lb 6.4 oz)   06/30/16 76.2 kg (168 lb)              Today, you had the following     No orders found for display       Primary Care Provider Office Phone # Fax #    Luz Coughlin, APRN [facility] [phone]       [address] [facility] [address]        Equal Access to Services     FERNANDA MARTÍNEZ : Hadii aad ku hadasho Soomaali, waaxda luqadaha, qaybta kaalmada adeegyada, waxay galloin haymaggi ash . So [facility] [phone].    ATENCIÓN: Si habla español, tiene a sharma disposición servicios gratuitos de asistencia lingüística. [facility] [phone].    We comply with applicable federal civil rights laws and [place] laws. We do not discriminate on the basis of race, color, national origin, age, disability sex, sexual orientation or gender identity.            Thank you!     Thank you for choosing [facility]  for your care. Our goal is always to provide you with excellent care. Hearing back from our patients is one way we can continue to improve our services. Please take a few minutes to complete the written survey that you may receive in the mail after your visit with us. Thank you!             Your Updated Medication List - Protect others around you: Learn how to safely use, store and throw away your medicines at www.disposemymeds.org.          This list is accurate as of: 8/24/17  8:10 AM.  Always use your most recent med list.                   Brand Name Dispense Instructions for use Diagnosis    aspirin 325 MG tablet      Take 325 mg by mouth daily Patient takes 162.5 mg        carboxymethylcellulose 0.5 % Soln ophthalmic solution    REFRESH PLUS     Place 1 drop  into both eyes daily as needed for dry eyes        diclofenac 0.1 % ophthalmic solution    VOLTAREN    5 mL    Place 1 drop into the right eye 3 times daily    Combined forms of age-related cataract of both eyes       lisinopril 2.5 MG tablet    PRINIVIL/Zestril    90 tablet    Take 1 tablet (2.5 mg) by mouth daily    Benign essential hypertension       metoprolol 25 MG tablet    LOPRESSOR    180 tablet    Take 1 tablet (25 mg) by mouth 2 times daily    Benign essential hypertension       MULTI VITAMIN/MINERALS PO      Take 1 tablet by mouth once.        ofloxacin 0.3 % ophthalmic solution    OCUFLOX    1 Bottle    Place 1 drop into the right eye 3 times daily    Combined forms of age-related cataract of both eyes       prednisoLONE acetate 1 % ophthalmic susp    PRED FORTE    5 mL    Place 1 drop into the right eye 3 times daily    Combined forms of age-related cataract of both eyes       SIMVASTATIN PO      Take 20 mg by mouth At Bedtime

## 2017-08-24 NOTE — PROGRESS NOTES
Current Eye Medications:  Vigamox tid, Pred tid, Diclof tid os     Subjective:  PO2 Kelman Phacoemulsification intraocular lens left eye        Objective:  See Ophthalmology Exam.       Assessment:  Doing well status/post Kelman phacoemulsification/ posterior chamber lens left eye.      Plan:   See Patient Instructions.

## 2017-09-21 ENCOUNTER — OFFICE VISIT (OUTPATIENT)
Dept: OPHTHALMOLOGY | Facility: CLINIC | Age: 82
End: 2017-09-21
Payer: MEDICARE

## 2017-09-21 DIAGNOSIS — Z96.1 PSEUDOPHAKIA: Primary | ICD-10-CM

## 2017-09-21 PROCEDURE — 99024 POSTOP FOLLOW-UP VISIT: CPT | Performed by: OPHTHALMOLOGY

## 2017-09-21 ASSESSMENT — VISUAL ACUITY
OD_SC: 20/25+3
OS_SC: 20/50
METHOD: SNELLEN - LINEAR

## 2017-09-21 ASSESSMENT — REFRACTION_MANIFEST
OD_ADD: +3.00
OS_SPHERE: -1.25
OD_SPHERE: -0.75
OS_CYLINDER: SPHERE
OS_ADD: +3.00
OD_AXIS: 110
OD_CYLINDER: +0.50

## 2017-09-21 ASSESSMENT — TONOMETRY
IOP_METHOD: APPLANATION
OD_IOP_MMHG: 17
OS_IOP_MMHG: 16

## 2017-09-21 ASSESSMENT — SLIT LAMP EXAM - LIDS
COMMENTS: 2+ DERMATOCHALASIS - UPPER LID
COMMENTS: 2+ DERMATOCHALASIS - UPPER LID

## 2017-09-21 ASSESSMENT — CUP TO DISC RATIO
OD_RATIO: 0.2
OS_RATIO: 0.2

## 2017-09-21 ASSESSMENT — EXTERNAL EXAM - RIGHT EYE: OD_EXAM: MILD BROW, PROLAPSED FAT PADS: UPPER, LOWER

## 2017-09-21 ASSESSMENT — EXTERNAL EXAM - LEFT EYE: OS_EXAM: MILD BROW, PROLAPSED FAT PADS: UPPER, LOWER

## 2017-09-21 NOTE — PATIENT INSTRUCTIONS
1)  Discontinue all drops, unless used prior to cataract surgery.    2)   Fill Rx for new glasses or drugstore readers.    3)   Return to clinic in three months for a pressure check or earlier if problems should   arise.    Ifeanyi Rosario M.D.

## 2017-09-21 NOTE — MR AVS SNAPSHOT
After Visit Summary   9/21/2017    Raeann Abdalla    MRN: [id number]           Patient Information     Date Of Birth          1935        Visit Information        Provider Department      9/21/2017 11:00 AM Ifeanyi Rosario MD [facility]        Care Instructions    1)  Discontinue all drops, unless used prior to cataract surgery.    2)   Fill Rx for new glasses or drugstore readers.    3)   Return to clinic in three months for a pressure check or earlier if problems should   arise.    Ifeanyi Rosario M.D.               Follow-ups after your visit        Who to contact     If you have questions or need follow up information about today's clinic visit or your schedule please contact [facility] directly at [phone].  Normal or non-critical lab and imaging results will be communicated to you by Greenlight Planethart, letter or phone within 4 business days after the clinic has received the results. If you do not hear from us within 7 days, please contact the clinic through Greenlight Planethart or phone. If you have a critical or abnormal lab result, we will notify you by phone as soon as possible.  Submit refill requests through TopRealty or call your pharmacy and they will forward the refill request to us. Please allow 3 business days for your refill to be completed.          Additional Information About Your Visit        MyChart Information     Top[employer]alty gives you secure access to your electronic health record. If you see a primary care provider, you can also send messages to your care team and make appointments. If you have questions, please call your primary care clinic.  If you do not have a primary care provider, please call [phone] and they will assist you.        Care EveryWhere ID     This is your Care EveryWhere ID. This could be used by other organizations to access your [place] medical records  [id number]         Blood Pressure from Last 3 Encounters:   07/18/17 124/70   02/24/17  116/60   06/30/16 124/70    Weight from Last 3 Encounters:   07/18/17 79.4 kg (175 lb)   02/24/17 78.7 kg (173 lb 6.4 oz)   06/30/16 76.2 kg (168 lb)              Today, you had the following     No orders found for display       Primary Care Provider Office Phone # Fax #    Luz Coughlin, STAR [facility] [phone]       [address] [facility] [address]        Equal Access to Services     [facility] : Hadii aad ku hadasho Soomaali, waaxda luqadaha, qaybta kaalmada adeegyada, waxay idiin hayaan adejosh ash . So [facility] [phone].    ATENCIÓN: Si habla español, tiene a sharma disposición servicios gratuitos de asistencia lingüística. Ashly al [phone].    We comply with applicable federal civil rights laws and [place] laws. We do not discriminate on the basis of race, color, national origin, age, disability sex, sexual orientation or gender identity.            Thank you!     Thank you for choosing [facility]  for your care. Our goal is always to provide you with excellent care. Hearing back from our patients is one way we can continue to improve our services. Please take a few minutes to complete the written survey that you may receive in the mail after your visit with us. Thank you!             Your Updated Medication List - Protect others around you: Learn how to safely use, store and throw away your medicines at www.disposemymeds.org.          This list is accurate as of: 9/21/17 12:03 PM.  Always use your most recent med list.                   Brand Name Dispense Instructions for use Diagnosis    aspirin 325 MG tablet      Take 325 mg by mouth daily Patient takes 162.5 mg        carboxymethylcellulose 0.5 % Soln ophthalmic solution    REFRESH PLUS     Place 1 drop into both eyes daily as needed for dry eyes        diclofenac 0.1 % ophthalmic solution    VOLTAREN    5 mL    Place 1 drop into the right eye 3 times daily    Combined forms of age-related  cataract of both eyes       lisinopril 2.5 MG tablet    PRINIVIL/Zestril    90 tablet    Take 1 tablet (2.5 mg) by mouth daily    Benign essential hypertension       metoprolol 25 MG tablet    LOPRESSOR    180 tablet    Take 1 tablet (25 mg) by mouth 2 times daily    Benign essential hypertension       MULTI VITAMIN/MINERALS PO      Take 1 tablet by mouth once.        ofloxacin 0.3 % ophthalmic solution    OCUFLOX    1 Bottle    Place 1 drop into the right eye 3 times daily    Combined forms of age-related cataract of both eyes       prednisoLONE acetate 1 % ophthalmic susp    PRED FORTE    5 mL    Place 1 drop into the right eye 3 times daily    Combined forms of age-related cataract of both eyes       SIMVASTATIN PO      Take 20 mg by mouth At Bedtime

## 2017-09-21 NOTE — PROGRESS NOTES
Current Eye Medications:  no     Subjective:  Final mr ou.  Pt reports is seeing good and her eyes are feeling fine.     Objective:  See Ophthalmology Exam.       Assessment:  Doing well status/post Kelman phacoemulsification/ posterior chamber lens both eyes.      Plan:   See Patient Instructions.

## 2017-10-14 ENCOUNTER — MYC REFILL (OUTPATIENT)
Dept: INTERNAL MEDICINE | Facility: CLINIC | Age: 82
End: 2017-10-14

## 2017-10-14 DIAGNOSIS — I10 BENIGN ESSENTIAL HYPERTENSION: ICD-10-CM

## 2017-10-16 RX ORDER — LISINOPRIL 2.5 MG/1
2.5 TABLET ORAL DAILY
Qty: 90 TABLET | Refills: 0 | Status: SHIPPED | OUTPATIENT
Start: 2017-10-16 | End: 2018-08-17

## 2017-10-16 RX ORDER — LISINOPRIL 2.5 MG/1
TABLET ORAL
Qty: 90 TABLET | Refills: 2 | Status: SHIPPED | OUTPATIENT
Start: 2017-10-16 | End: 2018-01-13

## 2017-10-16 NOTE — TELEPHONE ENCOUNTER
Message from [employer]:  Edie Barlow Oct 16, 2017 7:09 AM        ----- Message -----   From: Raeann Abdalla   Sent: 10/14/2017 9:34 AM   To: Gerry Babb  Subject: Medication Renewal Request     Original authorizing provider: STAR Courtney CNP would like a refill of the following medications:  lisinopril (PRINIVIL/ZESTRIL) 2.5 MG tablet [STAR Courtney CNP]    Preferred pharmacy: [facility] - [place] [id number] [facility]    Comment:  qty 90

## 2017-10-16 NOTE — TELEPHONE ENCOUNTER
Routing refill request to provider for review/approval because:  Patient was to follow up in 2 weeks for BMP and BP and has not followed up on the BP      Lisinopril      Last Written Prescription Date: 7/20/17  Last Fill Quantity: 90, # refills: 0  Last Office Visit with G, P or [facility] prescribing provider: 7/18/17  Next 5 appointments (look out 90 days)     Oct 19, 2017 10:00 AM CDT   Nurse Only with [facility] CLINIC   [facility] ([place] [id number] [facility] [address]   [phone]            Dec 14, 2017  1:15 PM CST   Return Visit with Ifeanyi Rosario MD   [facility] ([facility]    [address] [address]   [phone]                   Potassium   Date Value Ref Range Status   08/15/2017 4.3 3.4 - 5.3 mmol/L Final     Creatinine   Date Value Ref Range Status   08/15/2017 0.97 0.52 - 1.04 mg/dL Final     BP Readings from Last 3 Encounters:   07/18/17 124/70   02/24/17 116/60   06/30/16 124/70     Jacinta Razo RN - BC

## 2017-10-16 NOTE — TELEPHONE ENCOUNTER
Prescription approved per [facility] Refill Protocol.    Signed Prescriptions:                        Disp   Refills    lisinopril (PRINIVIL/ZESTRIL) 2.5 MG [id number] tab*2        Sig: TAKE ONE TABLET BY MOUTH ONCE DAILY  Authorizing Provider: NIKKO JACOB  Ordering User: ASAD LEVI RN

## 2017-10-16 NOTE — TELEPHONE ENCOUNTER
lisinopril (PRINIVIL/ZESTRIL) 2.5 MG tablet      Last Written Prescription Date: 7/20/2017  Last Fill Quantity: 90, # refills: 0  Last Office Visit with G, P or [facility] prescribing provider: 7/18/2017  Next 5 appointments (look out 90 days)     Oct 19, 2017 10:00 AM CDT   Nurse Only with [facility] CL[facility]IC   [facility] ([facility])    [address] [facility] [address] [facility] [address]   [phone]            Dec 14, 2017  1:15 PM CST   Return Visit with Ifeanyi Rosario MD   [facility] ([facility]    [address] [facility] [address]   [phone]                   Potassium   Date Value Ref Range Status   08/15/2017 4.3 3.4 - 5.3 mmol/L Final     Creatinine   Date Value Ref Range Status   08/15/2017 0.97 0.52 - 1.04 mg/dL Final     BP Readings from Last 3 Encounters:   07/18/17 124/70   02/24/17 116/60   06/30/16 124/70

## 2017-10-19 ENCOUNTER — ALLIED HEALTH/NURSE VISIT (OUTPATIENT)
Dept: NURSING | Facility: CLINIC | Age: 82
End: 2017-10-19
Payer: MEDICARE

## 2017-10-19 DIAGNOSIS — E78.5 DYSLIPIDEMIA: Primary | ICD-10-CM

## 2017-10-19 DIAGNOSIS — Z23 NEED FOR PROPHYLACTIC VACCINATION AND INOCULATION AGAINST INFLUENZA: Primary | ICD-10-CM

## 2017-10-19 LAB
CHOLEST SERPL-MCNC: 151 MG/DL
HDLC SERPL-MCNC: 48 MG/DL
LDLC SERPL CALC-MCNC: 67 MG/DL
NONHDLC SERPL-MCNC: 103 MG/DL
TRIGL SERPL-MCNC: 182 MG/DL

## 2017-10-19 PROCEDURE — 99207 ZZC NO CHARGE NURSE ONLY: CPT

## 2017-10-19 PROCEDURE — 80061 LIPID PANEL: CPT | Performed by: INTERNAL MEDICINE

## 2017-10-19 PROCEDURE — G0008 ADMIN INFLUENZA VIRUS VAC: HCPCS

## 2017-10-19 PROCEDURE — 36415 COLL VENOUS BLD VENIPUNCTURE: CPT | Performed by: INTERNAL MEDICINE

## 2017-10-19 PROCEDURE — 90662 IIV NO PRSV INCREASED AG IM: CPT

## 2017-10-19 NOTE — MR AVS SNAPSHOT
After Visit Summary   10/19/2017    Raeann Abdalla    MRN: [id number]           Patient Information     Date Of Birth          1935        Visit Information        Provider Department      10/19/2017 10:00 AM  FLU [facility]        Today's Diagnoses     Need for prophylactic vaccination and inoculation against influenza    -  1       Follow-ups after your visit        Your next 10 appointments already scheduled     Oct 19, 2017 10:00 AM CDT   Nurse Only with  FLU [facility] ([facility])    [address] [facility] [id number] [phone]            Dec 14, 2017  1:15 PM CST   Return Visit with Ifeanyi Rosario MD   [facility] ([facility])    [address] [id number] [phone]              Who to contact     If you have questions or need follow up information about today's clinic visit or your schedule please contact [facility] directly at [phone].  Normal or non-critical lab and imaging results will be communicated to you by MyChart, letter or phone within 4 business days after the clinic has received the results. If you do not hear from us within 7 days, please contact the clinic through Chirpmehart or phone. If you have a critical or abnormal lab result, we will notify you by phone as soon as possible.  Submit refill requests through PowerReviews or call your pharmacy and they will forward the refill request to us. Please allow 3 business days for your refill to be completed.          Additional Information About Your Visit        Chirpmehart Information     PowerReviews gives you secure access to your electronic health record. If you see a primary care provider, you can also send messages to your care team and make appointments. If you have questions, please call your primary care clinic.  If you do not have a primary care provider, please call [phone] and they will  assist you.        Care EveryWhere ID     This is your Care EveryWhere ID. This could be used by other organizations to access your [place] medical records  [id number]         Blood Pressure from Last 3 Encounters:   07/18/17 124/70   02/24/17 116/60   06/30/16 124/70    Weight from Last 3 Encounters:   07/18/17 175 lb (79.4 kg)   02/24/17 173 lb 6.4 oz (78.7 kg)   06/30/16 168 lb (76.2 kg)              We Performed the Following     FLU VACCINE, INCREASED ANTIGEN, PRESV FREE, AGE 65+ [20415]     Vaccine Administration, Initial [57319]        Primary Care Provider Office Phone # Fax #    Luz Morel STAR Coughlin [facility] [phone] [address] [facility] [address]        Equal Access to Services     [facility] : Hadii aad ku hadasho Soomaali, waaxda luqadaha, qaybta kaalmada adeegyada, dorothy holderin hayaan darren ash . So [facility] [phone].    ATENCIÓN: Si habla español, tiene a sharma disposición servicios gratuitos de asistencia lingüística. Llame al [phone].    We comply with applicable federal civil rights laws and [place] laws. We do not discriminate on the basis of race, color, national origin, age, disability, sex, sexual orientation, or gender identity.            Thank you!     Thank you for choosing [facility]  for your care. Our goal is always to provide you with excellent care. Hearing back from our patients is one way we can continue to improve our services. Please take a few minutes to complete the written survey that you may receive in the mail after your visit with us. Thank you!             Your Updated Medication List - Protect others around you: Learn how to safely use, store and throw away your medicines at www.disposemymeds.org.          This list is accurate as of: 10/19/17  9:52 AM.  Always use your most recent med list.                   Brand Name Dispense Instructions for use Diagnosis    aspirin 325 MG tablet      Take 325 mg by  mouth daily Patient takes 162.5 mg        carboxymethylcellulose 0.5 % Soln ophthalmic solution    REFRESH PLUS     Place 1 drop into both eyes daily as needed for dry eyes        diclofenac 0.1 % ophthalmic solution    VOLTAREN    5 mL    Place 1 drop into the right eye 3 times daily    Combined forms of age-related cataract of both eyes       * lisinopril 2.5 MG tablet    PRINIVIL/Zestril    90 tablet    TAKE ONE TABLET BY MOUTH ONCE DAILY    Benign essential hypertension       * lisinopril 2.5 MG tablet    PRINIVIL/Zestril    90 tablet    Take 1 tablet (2.5 mg) by mouth daily    Benign essential hypertension       metoprolol 25 MG tablet    LOPRESSOR    180 tablet    Take 1 tablet (25 mg) by mouth 2 times daily    Benign essential hypertension       MULTI VITAMIN/MINERALS PO      Take 1 tablet by mouth once.        ofloxacin 0.3 % ophthalmic solution    OCUFLOX    1 Bottle    Place 1 drop into the right eye 3 times daily    Combined forms of age-related cataract of both eyes       prednisoLONE acetate 1 % ophthalmic susp    PRED FORTE    5 mL    Place 1 drop into the right eye 3 times daily    Combined forms of age-related cataract of both eyes       SIMVASTATIN PO      Take 20 mg by mouth At Bedtime        * Notice:  This list has 2 medication(s) that are the same as other medications prescribed for you. Read the directions carefully, and ask your doctor or other care provider to review them with you.

## 2017-12-14 ENCOUNTER — OFFICE VISIT (OUTPATIENT)
Dept: OPHTHALMOLOGY | Facility: CLINIC | Age: 82
End: 2017-12-14
Payer: MEDICARE

## 2017-12-14 DIAGNOSIS — Z96.1 PSEUDOPHAKIA: Primary | ICD-10-CM

## 2017-12-14 PROCEDURE — 99213 OFFICE O/P EST LOW 20 MIN: CPT | Performed by: OPHTHALMOLOGY

## 2017-12-14 ASSESSMENT — VISUAL ACUITY
OS_SC: 20/40
OD_SC+: -2
OD_SC: 20/30
METHOD: SNELLEN - LINEAR

## 2017-12-14 ASSESSMENT — SLIT LAMP EXAM - LIDS
COMMENTS: 2+ DERMATOCHALASIS - UPPER LID
COMMENTS: 2+ DERMATOCHALASIS - UPPER LID

## 2017-12-14 ASSESSMENT — TONOMETRY
IOP_METHOD: APPLANATION
OD_IOP_MMHG: 11
OS_IOP_MMHG: 13

## 2017-12-14 ASSESSMENT — EXTERNAL EXAM - RIGHT EYE: OD_EXAM: MILD BROW, PROLAPSED FAT PADS: UPPER, LOWER

## 2017-12-14 ASSESSMENT — EXTERNAL EXAM - LEFT EYE: OS_EXAM: MILD BROW, PROLAPSED FAT PADS: UPPER, LOWER

## 2017-12-14 NOTE — PATIENT INSTRUCTIONS
Possible clouding of posterior capsule discussed.   Use reading glasses as needed   Call in August 2018 for an appointment in December 2018 for Complete Exam    Dr. Rosario [phone]

## 2017-12-14 NOTE — PROGRESS NOTES
Current Eye Medications:  no     Subjective:  3 month IOP check. Vision is fine both eyes. Zero pain, or discomfort both eyes.    Happy without glasses for distance;  OTC readers.     Objective:  See Ophthalmology Exam.       Assessment:  Doing well status/post Kelman phacoemulsification/ posterior chamber lens both eyes.      Plan:  Possible clouding of posterior capsule discussed.   Use reading glasses as needed   Call in August 2018 for an appointment in December 2018 for Complete Exam    Dr. Rosario [phone]

## 2017-12-14 NOTE — MR AVS SNAPSHOT
After Visit Summary   12/14/2017    Raeann Abdalla    MRN: [id number]           Patient Information     Date Of Birth          1935        Visit Information        Provider Department      12/14/2017 1:15 PM Ifeanyi Rosario MD [facility]        Care Instructions    Possible clouding of posterior capsule discussed.   Use reading glasses as needed   Call in August 2018 for an appointment in December 2018 for Complete Exam    Dr. Rosario [phone]          Follow-ups after your visit        Who to contact     If you have questions or need follow up information about today's clinic visit or your schedule please contact [facility] directly at [phone].  Normal or non-critical lab and imaging results will be communicated to you by MyChart, letter or phone within 4 business days after the clinic has received the results. If you do not hear from us within 7 days, please contact the clinic through SeedInvesthart or phone. If you have a critical or abnormal lab result, we will notify you by phone as soon as possible.  Submit refill requests through Fosbury or call your pharmacy and they will forward the refill request to us. Please allow 3 business days for your refill to be completed.          Additional Information About Your Visit        MyChart Information     Fosbury gives you secure access to your electronic health record. If you see a primary care provider, you can also send messages to your care team and make appointments. If you have questions, please call your primary care clinic.  If you do not have a primary care provider, please call [phone] and they will assist you.        Care EveryWhere ID     This is your Care EveryWhere ID. This could be used by other organizations to access your [place] medical records  [id number]         Blood Pressure from Last 3 Encounters:   07/18/17 124/70   02/24/17 116/60   06/30/16 124/70    Weight from Last 3 Encounters:    07/18/17 79.4 kg (175 lb)   02/24/17 78.7 kg (173 lb 6.4 oz)   06/30/16 76.2 kg (168 lb)              Today, you had the following     No orders found for display       Primary Care Provider Office Phone # Fax #    STAR Quezada [phone]       [address] [facility] [address]        Equal Access to Services     [facility] : Hadii aad ku hadasho Soomaali, waaxda luqadaha, qaybta kaalmada adeegyada, waxay idiin hayaan adeeg kharash la'aan . So [facility] [phone].    ATENCIÓN: Si fayla espafshin, tiene a sharma disposición servicios gratuitos de asistencia lingüística. Ll[facility] [phone].    We comply with applicable federal civil rights laws and [place] laws. We do not discriminate on the basis of race, color, national origin, age, disability, sex, sexual orientation, or gender identity.            Thank you!     Thank you for choosing [facility]  for your care. Our goal is always to provide you with excellent care. Hearing back from our patients is one way we can continue to improve our services. Please take a few minutes to complete the written survey that you may receive in the mail after your visit with us. Thank you!             Your Updated Medication List - Protect others around you: Learn how to safely use, store and throw away your medicines at www.disposemymeds.org.          This list is accurate as of: 12/14/17  1:34 PM.  Always use your most recent med list.                   Brand Name Dispense Instructions for use Diagnosis    aspirin 325 MG tablet      Take 325 mg by mouth daily Patient takes 162.5 mg        carboxymethylcellulose 0.5 % Soln ophthalmic solution    REFRESH PLUS     Place 1 drop into both eyes daily as needed for dry eyes        * lisinopril 2.5 MG tablet    PRINIVIL/Zestril    90 tablet    TAKE ONE TABLET BY MOUTH ONCE DAILY    Benign essential hypertension       * lisinopril 2.5 MG tablet    PRINIVIL/Zestril    90 tablet     Take 1 tablet (2.5 mg) by mouth daily    Benign essential hypertension       metoprolol 25 MG tablet    LOPRESSOR    180 tablet    Take 1 tablet (25 mg) by mouth 2 times daily    Benign essential hypertension       MULTI VITAMIN/MINERALS PO      Take 1 tablet by mouth once.        SIMVASTATIN PO      Take 20 mg by mouth At Bedtime        * Notice:  This list has 2 medication(s) that are the same as other medications prescribed for you. Read the directions carefully, and ask your doctor or other care provider to review them with you.

## 2018-01-11 ENCOUNTER — TRANSFERRED RECORDS (OUTPATIENT)
Dept: HEALTH INFORMATION MANAGEMENT | Facility: CLINIC | Age: 83
End: 2018-01-11

## 2018-01-30 NOTE — TELEPHONE ENCOUNTER
Duplicate  lisinopril (PRINIVIL/ZESTRIL) 5 MG tablet 90 tablet 0 2/20/2017  No      Sig: Take 1 tablet (5 mg) by mouth daily Please schedule follow up visit     Class: E-Prescribe     Notes to Pharmacy: Patient due for office visit for further refills     Route: Oral     Order: [id number]     E-Prescribing Status: Receipt confirmed by pharmacy (2/20/2017 11:52 AM CST     Radha LEDESMA CMA (AAMA)     normal behavior/normal affect

## 2018-02-12 ENCOUNTER — ALLIED HEALTH/NURSE VISIT (OUTPATIENT)
Dept: FAMILY MEDICINE | Facility: CLINIC | Age: 83
End: 2018-02-12
Payer: MEDICARE

## 2018-02-12 VITALS — SYSTOLIC BLOOD PRESSURE: 116 MMHG | HEART RATE: 64 BPM | DIASTOLIC BLOOD PRESSURE: 68 MMHG

## 2018-02-12 DIAGNOSIS — I10 HYPERTENSION GOAL BP (BLOOD PRESSURE) < 140/90: Primary | ICD-10-CM

## 2018-02-12 PROCEDURE — 99207 ZZC NO CHARGE NURSE ONLY: CPT | Performed by: NURSE PRACTITIONER

## 2018-02-12 NOTE — MR AVS SNAPSHOT
After Visit Summary   2/12/2018    Raeann Abdalla    MRN: [id number]           Patient Information     Date Of Birth          1935        Visit Information        Provider Department      2/12/2018 11:06 AM Luz Coughlin APRN CNP [facility]        Today's Diagnoses     Hypertension goal BP (blood pressure) < 140/90    -  1       Follow-ups after your visit        Who to contact     If you have questions or need follow up information about today's clinic visit or your schedule please contact [facility] directly at [phone].  Normal or non-critical lab and imaging results will be communicated to you by [place] Homesteadhart, letter or phone within 4 business days after the clinic has received the results. If you do not hear from us within 7 days, please contact the clinic through [place] [employer] or phone. If you have a critical or abnormal lab result, we will notify you by phone as soon as possible.  Submit refill requests through CrestHire or call your pharmacy and they will forward the refill request to us. Please allow 3 business days for your refill to be completed.          Additional Information About Your Visit        MyChart Information     CrestHire gives you secure access to your electronic health record. If you see a primary care provider, you can also send messages to your care team and make appointments. If you have questions, please call your primary care clinic.  If you do not have a primary care provider, please call [phone] and they will assist you.        Care EveryWhere ID     This is your Care EveryWhere ID. This could be used by other organizations to access your [place] medical records  [id number]        Your Vitals Were     Pulse                   64            Blood Pressure from Last 3 Encounters:   02/12/18 116/68   07/18/17 124/70   02/24/17 116/60    Weight from Last 3 Encounters:   07/18/17 175 lb (79.4 kg)   02/24/17 173 lb 6.4 oz (78.7 kg)    06/30/16 168 lb (76.2 kg)              Today, you had the following     No orders found for display       Primary Care Provider Office Phone # Fax #    STAR Quezada [facility] [phone] [address]  [place] [address]        Equal Access to Services     [facility] : Hadii aad ku hadasho Soomaali, waaxda luqadaha, qaybta kaalmada adeegyada, waxay idiin hayaan adeeg kharash la'aan . So [facility] [phone].    ATENCIÓN: Si habla español, tiene a sharma disposición servicios gratuitos de asistencia lingüística. Llame al [phone].    We comply with applicable federal civil rights laws and [place] laws. We do not discriminate on the basis of race, color, national origin, age, disability, sex, sexual orientation, or gender identity.            Thank you!     Thank you for choosing [facility]  for your care. Our goal is always to provide you with excellent care. Hearing back from our patients is one way we can continue to improve our services. Please take a few minutes to complete the written survey that you may receive in the mail after your visit with us. Thank you!             Your Updated Medication List - Protect others around you: Learn how to safely use, store and throw away your medicines at www.disposemymeds.org.          This list is accurate as of 2/12/18 11:59 PM.  Always use your most recent med list.                   Brand Name Dispense Instructions for use Diagnosis    aspirin 325 MG tablet      Take 325 mg by mouth daily Patient takes 162.5 mg        carboxymethylcellulose 0.5 % Soln ophthalmic solution    REFRESH PLUS     Place 1 drop into both eyes daily as needed for dry eyes        * lisinopril 2.5 MG tablet    PRINIVIL/Zestril    90 tablet    Take 1 tablet (2.5 mg) by mouth daily    Benign essential hypertension       * lisinopril 2.5 MG tablet    PRINIVIL/Zestril    90 tablet    Take 1 tablet (2.5 mg) by mouth daily    Benign essential  hypertension       metoprolol tartrate 25 MG tablet    LOPRESSOR    180 tablet    TAKE ONE TABLET BY MOUTH TWICE DAILY    Benign essential hypertension       MULTI VITAMIN/MINERALS PO      Take 1 tablet by mouth once.        SIMVASTATIN PO      Take 20 mg by mouth At Bedtime        * Notice:  This list has 2 medication(s) that are the same as other medications prescribed for you. Read the directions carefully, and ask your doctor or other care provider to review them with you.

## 2018-02-12 NOTE — PROGRESS NOTES
Raeann Abdalla is enrolled/participating in the retail pharmacy Blood Pressure Goals Achievement Program (BPGAP).  Raeann Abdalla was evaluated at [facility] on February 12, 2018 at which time her blood pressure was:    BP Readings from Last 3 Encounters:   02/12/18 116/68   07/18/17 124/70   02/24/17 116/60     Reviewed lifestyle modifications for blood pressure control and reduction: including making healthy food choices, managing weight, getting regular exercise, smoking cessation, reducing alcohol consumption, monitoring blood pressure regularly.     Raeann Abdalla is not experiencing symptoms.    Follow-Up: BP is at goal of < 140/90mmHg (patient 18+ years of age with or without diabetes).  Recommended follow-up at pharmacy in 6 months.     Recommendation to Provider: None at this time.     Raeann Abdalla was evaluated for enrollment into the PGEN study today.    Patient eligible for enrollment:  No  Patient interested in enrollment:  No    Completed by: Thank you,  Misti Harris, PharmD  [facility]  [phone]

## 2018-07-30 ENCOUNTER — MYC MEDICAL ADVICE (OUTPATIENT)
Dept: FAMILY MEDICINE | Facility: CLINIC | Age: 83
End: 2018-07-30

## 2018-07-30 DIAGNOSIS — I10 HYPERTENSION GOAL BP (BLOOD PRESSURE) < 140/90: Primary | ICD-10-CM

## 2018-07-30 NOTE — TELEPHONE ENCOUNTER
Orders placed. Pt notified via Off Grid Electrichart.    Radha Merritt RN  [facility]

## 2018-08-03 ENCOUNTER — MYC REFILL (OUTPATIENT)
Dept: FAMILY MEDICINE | Facility: CLINIC | Age: 83
End: 2018-08-03

## 2018-08-03 DIAGNOSIS — I10 BENIGN ESSENTIAL HYPERTENSION: ICD-10-CM

## 2018-08-06 RX ORDER — METOPROLOL TARTRATE 25 MG/1
25 TABLET, FILM COATED ORAL 2 TIMES DAILY
Qty: 180 TABLET | Refills: 1 | Status: CANCELLED | OUTPATIENT
Start: 2018-08-06

## 2018-08-06 RX ORDER — METOPROLOL TARTRATE 25 MG/1
25 TABLET, FILM COATED ORAL 2 TIMES DAILY
Qty: 60 TABLET | Refills: 0 | Status: SHIPPED | OUTPATIENT
Start: 2018-08-06 | End: 2018-08-06

## 2018-08-06 NOTE — TELEPHONE ENCOUNTER
Signed Prescriptions:                        Disp   Refills    metoprolol tartrate (LOPRESSOR) 25 MG tabl*60 tab*0        Sig: Take 1 tablet (25 mg) by mouth 2 times daily Office           visit needed for further refills.  Authorizing Provider: NIKKO JACOB  Ordering User: KATHRYN SEAY    Medication is being filled for 1 time refill only due to:  Patient needs to be seen because it has been more than one year since last visit.     Kathryn Seay RN on 8/6/2018 at 12:03 PM

## 2018-08-06 NOTE — TELEPHONE ENCOUNTER
Reference refill encounter dated 8/3/2018, one time refill sent as patient is due for yearly follow up.     Kathryn Rogers RN on 8/6/2018 at 12:07 PM

## 2018-08-06 NOTE — TELEPHONE ENCOUNTER
Message from Alivia:  Original authorizing provider: STAR Courtney CNP would like a refill of the following medications:  metoprolol tartrate (LOPRESSOR) 25 MG tablet [STAR Courtney CNP]    Preferred pharmacy: [facility] [address] [id number] [facility]    Comment:  Qty 180 ( 25mg 2x daily)

## 2018-08-17 ENCOUNTER — OFFICE VISIT (OUTPATIENT)
Dept: FAMILY MEDICINE | Facility: CLINIC | Age: 83
End: 2018-08-17
Payer: MEDICARE

## 2018-08-17 VITALS
SYSTOLIC BLOOD PRESSURE: 124 MMHG | HEART RATE: 64 BPM | HEIGHT: 62 IN | WEIGHT: 176.4 LBS | TEMPERATURE: 97.4 F | OXYGEN SATURATION: 96 % | BODY MASS INDEX: 32.46 KG/M2 | DIASTOLIC BLOOD PRESSURE: 74 MMHG | RESPIRATION RATE: 18 BRPM

## 2018-08-17 DIAGNOSIS — N18.30 CKD (CHRONIC KIDNEY DISEASE) STAGE 3, GFR 30-59 ML/MIN (H): ICD-10-CM

## 2018-08-17 DIAGNOSIS — R41.3 SHORT-TERM MEMORY LOSS: ICD-10-CM

## 2018-08-17 DIAGNOSIS — I10 HYPERTENSION GOAL BP (BLOOD PRESSURE) < 140/90: ICD-10-CM

## 2018-08-17 DIAGNOSIS — E78.5 HYPERLIPIDEMIA LDL GOAL <70: ICD-10-CM

## 2018-08-17 DIAGNOSIS — I10 HYPERTENSION GOAL BP (BLOOD PRESSURE) < 140/90: Primary | ICD-10-CM

## 2018-08-17 DIAGNOSIS — I25.810 CORONARY ARTERY DISEASE INVOLVING AUTOLOGOUS ARTERY CORONARY BYPASS GRAFT WITHOUT ANGINA PECTORIS: ICD-10-CM

## 2018-08-17 PROCEDURE — 99214 OFFICE O/P EST MOD 30 MIN: CPT | Performed by: NURSE PRACTITIONER

## 2018-08-17 ASSESSMENT — PAIN SCALES - GENERAL: PAINLEVEL: NO PAIN (0)

## 2018-08-17 NOTE — PATIENT INSTRUCTIONS
[facility]    If you have any questions regarding to your visit please contact your care team:     Team Pink:   Clinic Hours Telephone Number   Internal Medicine:  Dr. Yissel Coughlin NP       7am-7pm  Monday - Thursday   7am-5pm  Fridays  [phone]  (Appointment scheduling available 24/7)    Questions about your recent visit?  Team Line  [phone]   Urgent Care - [place] - 11am-9pm Monday-Friday Saturday-Sunday- 9am-5pm   [place] - 5pm-9pm Monday-Friday Saturday-Sunday- 9am-5pm  [phone] - Kaila Avalos  [phone] - [place]       What options do I have for a visit other than an office visit? We offer electronic visits (e-visits) and telephone visits, when medically appropriate.  Please check with your medical insurance to see if these types of visits are covered, as you will be responsible for any charges that are not paid by your insurance.      You can use [employer] (secure electronic communication) to access to your chart, send your primary care provider a message, or make an appointment. Ask a team member how to get started.     For a price quote for your services, please call our Consumer Price Line at [phone] or our Imaging Cost estimation line at [phone] (for imaging tests).  Maryjane Calderón CMA

## 2018-08-17 NOTE — MR AVS SNAPSHOT
After Visit Summary   8/17/2018    Raeann Abdalla    MRN: [id number]           Patient Information     Date Of Birth          1935        Visit Information        Provider Department      8/17/2018 9:20 AM Luz Coughlin APRN [facility]        Today's Diagnoses     Hypertension goal BP (blood pressure) < 140/90    -  1    CKD (chronic kidney disease) stage 3, GFR 30-59 ml/min        Coronary artery disease involving autologous artery coronary bypass graft without angina pectoris        Hyperlipidemia LDL goal <70        Short-term memory loss          Care Instructions    [facility]    If you have any questions regarding to your visit please contact your care team:     Team Pink:   Clinic Hours Telephone Number   Internal Medicine:  Dr. Yissel Coughlin, NP       7am-7pm  Monday - Thursday   7am-5pm  Fridays  [phone]  (Appointment scheduling available 24/7)    Questions about your recent visit?  Team Line  [phone]   Urgent Care - [place] and [place] - 11am-9pm Monday-Friday Saturday-Sunday- 9am-5pm   [place] - 5pm-9pm Monday-Friday Saturday-Sunday- 9am-5pm  [phone] - Kaila Avalos  [phone] - [place]       What options do I have for a visit other than an office visit? We offer electronic visits (e-visits) and telephone visits, when medically appropriate.  Please check with your medical insurance to see if these types of visits are covered, as you will be responsible for any charges that are not paid by your insurance.      You can use HeatSync (secure electronic communication) to access to your chart, send your primary care provider a message, or make an appointment. Ask a team member how to get started.     For a price quote for your services, please call our Consumer Price Line at [phone] or our Imaging Cost estimation line at [phone] (for imaging tests).  Maryjane  [facility]             Follow-ups after your visit        Your next 10 appointments already scheduled     Sep 25, 2018  2:00 PM CDT   New Visit with Ifeanyi Rosario MD   [facility] [place] ([facility])    [address]  [place] [address] [phone]              Future tests that were ordered for you today     Open Future Orders        Priority Expected Expires Ordered    **Basic metabolic panel FUTURE anytime Routine 8/17/2018 8/17/2019 8/17/2018    Lipid panel reflex to direct LDL Fasting Routine 8/17/2018 8/17/2019 8/17/2018    **Hemoglobin FUTURE anytime Routine 8/17/2018 8/17/2019 8/17/2018            Who to contact     If you have questions or need follow up information about today's clinic visit or your schedule please contact [facility] directly at [phone].  Normal or non-critical lab and imaging results will be communicated to you by Cigitalhart, letter or phone within 4 business days after the clinic has received the results. If you do not hear from us within 7 days, please contact the clinic through Cigitalhart or phone. If you have a critical or abnormal lab result, we will notify you by phone as soon as possible.  Submit refill requests through Semmx or call your pharmacy and they will forward the refill request to us. Please allow 3 business days for your refill to be completed.          Additional Information About Your Visit        CigitalLineville Information     Semmx gives you secure access to your electronic health record. If you see a primary care provider, you can also send messages to your care team and make appointments. If you have questions, please call your primary care clinic.  If you do not have a primary care provider, please call [phone] and they will assist you.        Care EveryWhere ID     This is your Care EveryWhere ID. This could be used by other organizations to access your [place] medical records  [id number]        Your  "Vitals Were     Pulse Temperature Respirations Height Pulse Oximetry BMI (Body Mass Index)    64 97.4  F (36.3  C) (Oral) 18 5' 2.4\" (1.585 m) 96% 31.85 kg/m2       Blood Pressure from Last 3 Encounters:   08/17/18 124/74   02/12/18 116/68   07/18/17 124/70    Weight from Last 3 Encounters:   08/17/18 176 lb 6.4 oz (80 kg)   07/18/17 175 lb (79.4 kg)   02/24/17 173 lb 6.4 oz (78.7 kg)               Primary Care Provider Office Phone # Fax #    Luz Morel Nemesio Doe, STAR [facility] [phone]       [address] [address]        Equal Access to Services     [facility]: Hadii joni miller hadasho Soomaali, waaxda luqadaha, qaybta kaalmada adeegyada, dorothy ash . So [facility] [phone].    ATENCIÓN: Si habla español, tiene a sharma disposición servicios gratuitos de asistencia lingüística. Ashly al [phone].    We comply with applicable federal civil rights laws and [place] laws. We do not discriminate on the basis of race, color, national origin, age, disability, sex, sexual orientation, or gender identity.            Thank you!     Thank you for choosing [facility]  for your care. Our goal is always to provide you with excellent care. Hearing back from our patients is one way we can continue to improve our services. Please take a few minutes to complete the written survey that you may receive in the mail after your visit with us. Thank you!             Your Updated Medication List - Protect others around you: Learn how to safely use, store and throw away your medicines at www.disposemymeds.org.          This list is accurate as of 8/17/18  9:54 AM.  Always use your most recent med list.                   Brand Name Dispense Instructions for use Diagnosis    aspirin 325 MG tablet      Take 325 mg by mouth daily Patient takes 162.5 mg        carboxymethylcellulose 0.5 % Soln ophthalmic solution    REFRESH PLUS     Place 1 drop into both eyes daily as " needed for dry eyes        lisinopril 2.5 MG tablet    PRINIVIL/Zestril    90 tablet    Take 1 tablet (2.5 mg) by mouth daily    Benign essential hypertension       metoprolol tartrate 25 MG tablet    LOPRESSOR    180 tablet    Take 1 tablet (25 mg) by mouth 2 times daily Office visit needed for further refills.    Benign essential hypertension       MULTI VITAMIN/MINERALS PO      Take 1 tablet by mouth once.        SIMVASTATIN PO      Take 20 mg by mouth At Bedtime

## 2018-08-17 NOTE — PROGRESS NOTES
SUBJECTIVE:   Raeann Abdalla is a 83 year old female who presents to clinic today for the following health issues:        Hyperlipidemia Follow-Up      Rate your low fat/cholesterol diet?: not monitoring fat    Taking statin?  Yes, no muscle aches from statin    Other lipid medications/supplements?:  none    Hypertension Follow-up      Outpatient blood pressures are being checked at home.  Results are 155//82.    Low Salt Diet: no added salt      Amount of exercise or physical activity: 6-7 days/week for an average of 30-45 minutes    Problems taking medications regularly: No    Medication side effects: none    Diet: regular (no restrictions)    CAD- patient denies chest pain, shortness of breath, edema.      Patient did not fast this am for lab testing.  Patient's  became very angry with her during appointment about this.  He notes her memory is worsening and her short term memory loss is significant.  She has not been getting lost and he does not feel her safety is in jeopardy, but he is frustrated by her memory loss.  He is also angry, because he feels that it's difficult to come in for appointments.  He feels he has to wait too long.    Problem list and histories reviewed & adjusted, as indicated.  Additional history: as documented    Patient Active Problem List   Diagnosis     Hx of retinal hemorrhage     Hypertension goal BP (blood pressure) < 140/90     S/P AVR (aortic valve replacement)     S/P CABG (coronary artery bypass graft)     Short-term memory loss     S/P bilateral hip replacements     History of right-sided carotid endarterectomy     CKD (chronic kidney disease) stage 3, GFR 30-59 ml/min     Hyperlipidemia LDL goal <70     Coronary artery disease involving autologous artery coronary bypass graft without angina pectoris     Posterior vitreous detachment, bilateral     Pseudophakia, ou     Past Surgical History:   Procedure Laterality Date     ABDOMEN SURGERY  Feb. 2014    aortic valve  replacement     AORTIC VALVE REPLACEMENT       BYPASS GRAFT ARTERY CORONARY      X 3     CATARACT IOL, RT/LT       COLONOSCOPY  2010 ?    polyps found for first time     endarectomy Right     carotid     ENT SURGERY       HEAD & NECK SURGERY      rt carotid artery neck plaque removal     JOINT REPLACEMENT, HIP RT/LT Bilateral      PHACOEMULSIFICATION WITH STANDARD INTRAOCULAR LENS IMPLANT  07/2017; 8/2017    right eye; left eye     THORACIC SURGERY  2014    aortic valve replacement, triple bypass     TONSILLECTOMY N/A      VASCULAR SURGERY  2013    rt carotid artery neck plaque removal       Social History   Substance Use Topics     Smoking status: Never Smoker     Smokeless tobacco: Never Used     Alcohol use 0.0 oz/week      Comment: 12 0z beer daily     Family History   Problem Relation Age of Onset     Other Cancer Sister      Macular Degeneration Mother 75     Other Cancer Mother      Ovarian     Coronary Artery Disease Father      Hypertension Father      Hyperlipidemia Father      Other Cancer Sister      cervical         Current Outpatient Prescriptions   Medication Sig Dispense Refill     aspirin 325 MG tablet Take 325 mg by mouth daily Patient takes 162.5 mg       carboxymethylcellulose (REFRESH PLUS) 0.5 % SOLN Place 1 drop into both eyes daily as needed for dry eyes       lisinopril (PRINIVIL/ZESTRIL) 2.5 MG tablet Take 1 tablet (2.5 mg) by mouth daily 90 tablet 0     metoprolol tartrate (LOPRESSOR) 25 MG tablet Take 1 tablet (25 mg) by mouth 2 times daily Office visit needed for further refills. 180 tablet 0     Multiple Vitamins-Minerals (MULTI VITAMIN/MINERALS PO) Take 1 tablet by mouth once.       SIMVASTATIN PO Take 20 mg by mouth At Bedtime        [DISCONTINUED] lisinopril (PRINIVIL/ZESTRIL) 2.5 MG tablet Take 1 tablet (2.5 mg) by mouth daily 90 tablet 0     Allergies   Allergen Reactions     Sulfa Drugs      BP Readings from Last 3 Encounters:   08/17/18 124/74   02/12/18 116/68   07/18/17 124/70  "   Wt Readings from Last 3 Encounters:   08/17/18 176 lb 6.4 oz (80 kg)   07/18/17 175 lb (79.4 kg)   02/24/17 173 lb 6.4 oz (78.7 kg)                  Labs reviewed in EPIC    Reviewed and updated as needed this visit by clinical staff       Reviewed and updated as needed this visit by Provider         ROS:  Constitutional, HEENT, cardiovascular, pulmonary, gi and gu systems are negative, except as otherwise noted.    OBJECTIVE:     /74  Pulse 64  Temp 97.4  F (36.3  C) (Oral)  Resp 18  Ht 5' 2.4\" (1.585 m)  Wt 176 lb 6.4 oz (80 kg)  SpO2 96%  BMI 31.85 kg/m2  Body mass index is 31.85 kg/(m^2).  GENERAL: healthy, alert and no distress  EYES: Eyes grossly normal to inspection, PERRL and conjunctivae and sclerae normal  HENT: normal cephalic/atraumatic, both ears: occluded with wax, nose and mouth without ulcers or lesions, oropharynx clear and oral mucous membranes moist  NECK: no adenopathy, no asymmetry, masses, or scars and thyroid normal to palpation  RESP: lungs clear to auscultation - no rales, rhonchi or wheezes  CV: regular rate and rhythm, normal S1 S2, no S3 or S4, no murmur, click or rub, no peripheral edema and peripheral pulses strong  MS: no gross musculoskeletal defects noted, no edema    Diagnostic Test Results:  none     ASSESSMENT/PLAN:     1. Hypertension goal BP (blood pressure) < 140/90  Stable.  Continue current treatment plan and medications.      2. CKD (chronic kidney disease) stage 3, GFR 30-59 ml/min    - **Basic metabolic panel FUTURE anytime; Future  - **Hemoglobin FUTURE anytime; Future    3. Coronary artery disease involving autologous artery coronary bypass graft without angina pectoris  Stable.  Continue current treatment plan and medications.      4. Hyperlipidemia LDL goal <70  Stable.  Continue current treatment plan and medications.    - Lipid panel reflex to direct LDL Fasting; Future    5. Short-term memory loss  I offered additional test, donepezil prescription.  " Patient and spouse decline at this time.  They decline any help with additional resources as well.      FUTURE APPOINTMENTS:       - Follow-up for annual visit or as needed    STAR Courtney CNP  [facility]

## 2018-08-24 DIAGNOSIS — E78.5 HYPERLIPIDEMIA LDL GOAL <70: ICD-10-CM

## 2018-08-24 DIAGNOSIS — N18.30 CKD (CHRONIC KIDNEY DISEASE) STAGE 3, GFR 30-59 ML/MIN (H): ICD-10-CM

## 2018-08-24 LAB
ANION GAP SERPL CALCULATED.3IONS-SCNC: 8 MMOL/L (ref 3–14)
BUN SERPL-MCNC: 17 MG/DL (ref 7–30)
CALCIUM SERPL-MCNC: 9.5 MG/DL (ref 8.5–10.1)
CHLORIDE SERPL-SCNC: 106 MMOL/L (ref 94–109)
CHOLEST SERPL-MCNC: 137 MG/DL
CO2 SERPL-SCNC: 26 MMOL/L (ref 20–32)
CREAT SERPL-MCNC: 1 MG/DL (ref 0.52–1.04)
GFR SERPL CREATININE-BSD FRML MDRD: 53 ML/MIN/1.7M2
GLUCOSE SERPL-MCNC: 87 MG/DL (ref 70–99)
HDLC SERPL-MCNC: 45 MG/DL
HGB BLD-MCNC: 13.6 G/DL (ref 11.7–15.7)
LDLC SERPL CALC-MCNC: 62 MG/DL
NONHDLC SERPL-MCNC: 92 MG/DL
POTASSIUM SERPL-SCNC: 5.2 MMOL/L (ref 3.4–5.3)
SODIUM SERPL-SCNC: 140 MMOL/L (ref 133–144)
TRIGL SERPL-MCNC: 151 MG/DL

## 2018-08-24 PROCEDURE — 85018 HEMOGLOBIN: CPT | Performed by: NURSE PRACTITIONER

## 2018-08-24 PROCEDURE — 80061 LIPID PANEL: CPT | Performed by: NURSE PRACTITIONER

## 2018-08-24 PROCEDURE — 36415 COLL VENOUS BLD VENIPUNCTURE: CPT | Performed by: NURSE PRACTITIONER

## 2018-08-24 PROCEDURE — 80048 BASIC METABOLIC PNL TOTAL CA: CPT | Performed by: NURSE PRACTITIONER

## 2018-08-24 NOTE — PROGRESS NOTES
Dear Raeann,    Your recent test results are attached.      No anemia.    If you have any questions please feel free to contact [phone] or myself via Online Prasadt.    Sincerely,  Luz Coughlin, CNP

## 2018-08-27 NOTE — PROGRESS NOTES
Dear Raeann,    Your recent test results are attached.      Stable kidney function.  Good cholesterol.    If you have any questions please feel free to contact [phone] or myself via My COIt.    Sincerely,  Luz Coughlin, CNP

## 2018-09-25 ENCOUNTER — OFFICE VISIT (OUTPATIENT)
Dept: OPHTHALMOLOGY | Facility: CLINIC | Age: 83
End: 2018-09-25
Payer: MEDICARE

## 2018-09-25 DIAGNOSIS — H52.4 PRESBYOPIA: ICD-10-CM

## 2018-09-25 DIAGNOSIS — H35.60 RETINAL HEMORRHAGE, UNSPECIFIED LATERALITY: ICD-10-CM

## 2018-09-25 DIAGNOSIS — Z96.1 PSEUDOPHAKIA: Primary | ICD-10-CM

## 2018-09-25 DIAGNOSIS — H43.813 POSTERIOR VITREOUS DETACHMENT, BILATERAL: ICD-10-CM

## 2018-09-25 PROCEDURE — 92015 DETERMINE REFRACTIVE STATE: CPT | Mod: GY | Performed by: OPHTHALMOLOGY

## 2018-09-25 PROCEDURE — 92014 COMPRE OPH EXAM EST PT 1/>: CPT | Performed by: OPHTHALMOLOGY

## 2018-09-25 ASSESSMENT — EXTERNAL EXAM - RIGHT EYE: OD_EXAM: MILD BROW, PROLAPSED FAT PADS: UPPER, LOWER

## 2018-09-25 ASSESSMENT — REFRACTION_WEARINGRX
OS_SPHERE: -1.25
OD_AXIS: 125
SPECS_TYPE: PAL
OS_ADD: +3.00
OD_CYLINDER: +1.00
OD_ADD: +3.00
OD_SPHERE: -0.75
OS_AXIS: 040
OS_CYLINDER: +1.25

## 2018-09-25 ASSESSMENT — REFRACTION_MANIFEST
OS_SPHERE: -1.00
OS_CYLINDER: +1.00
OS_AXIS: 035
OD_ADD: +3.00
OD_AXIS: 130
OD_CYLINDER: +1.25
OD_SPHERE: -0.75
OS_ADD: +3.00

## 2018-09-25 ASSESSMENT — EXTERNAL EXAM - LEFT EYE: OS_EXAM: MILD BROW, PROLAPSED FAT PADS: UPPER, LOWER

## 2018-09-25 ASSESSMENT — CONF VISUAL FIELD
OS_NORMAL: 1
OD_NORMAL: 1

## 2018-09-25 ASSESSMENT — SLIT LAMP EXAM - LIDS
COMMENTS: 2+ DERMATOCHALASIS - UPPER LID
COMMENTS: 2+ DERMATOCHALASIS - UPPER LID

## 2018-09-25 ASSESSMENT — TONOMETRY
OD_IOP_MMHG: 12
OS_IOP_MMHG: 13
IOP_METHOD: APPLANATION

## 2018-09-25 ASSESSMENT — VISUAL ACUITY
OS_CC: 20/20-2
CORRECTION_TYPE: GLASSES
OD_CC: 20/20
OS_CC: J1
OD_CC: J1
METHOD: SNELLEN - LINEAR

## 2018-09-25 ASSESSMENT — CUP TO DISC RATIO
OS_RATIO: 0.2
OD_RATIO: 0.2

## 2018-09-25 NOTE — LETTER
9/25/2018         RE: Raeann Abdalla  [address] [place] [address]  [place] [address]        Dear Colleague,    Thank you for referring your patient, Raeann Abdalla, to the [facility]. Please see a copy of my visit note below.     Current Eye Medications:  no     Subjective:  Comprehensive eye exam.   Patient reports is seeing well, vision seems unchanged and states eyes seem otherwise fine.     Objective:  See Ophthalmology Exam.       Assessment:  Stable eye exam.      ICD-10-CM    1. Pseudophakia, ou Z96.1 EYE EXAM (SIMPLE-NONBILLABLE)   2. Hx of retinal hemorrhage H35.60    3. Posterior vitreous detachment, bilateral H43.813    4. Presbyopia H52.4 REFRACTIVE STATUS        Plan:  Glasses Rx given - optional.  Possible clouding of posterior capsule both eyes discussed.  Call in May 2019 for an appointment in September 2019 for Complete Exam.    Dr. Rosario [phone]           Again, thank you for allowing me to participate in the care of your patient.        Sincerely,        Ifeanyi Rosario MD

## 2018-09-25 NOTE — PROGRESS NOTES
Current Eye Medications:  no     Subjective:  Comprehensive eye exam.   Patient reports is seeing well, vision seems unchanged and states eyes seem otherwise fine.     Objective:  See Ophthalmology Exam.       Assessment:  Stable eye exam.      ICD-10-CM    1. Pseudophakia, ou Z96.1 EYE EXAM (SIMPLE-NONBILLABLE)   2. Hx of retinal hemorrhage H35.60    3. Posterior vitreous detachment, bilateral H43.813    4. Presbyopia H52.4 REFRACTIVE STATUS        Plan:  Glasses Rx given - optional.  Possible clouding of posterior capsule both eyes discussed.  Call in May 2019 for an appointment in September 2019 for Complete Exam.    Dr. Rosario [phone]

## 2018-09-25 NOTE — PATIENT INSTRUCTIONS
Glasses Rx given - optional.  Possible clouding of posterior capsule both eyes discussed.  Call in May 2019 for an appointment in September 2019 for Complete Exam.    Dr. Rosario [phone]

## 2018-09-25 NOTE — MR AVS SNAPSHOT
After Visit Summary   9/25/2018    Raeann Abdalla    MRN: [id number]           Patient Information     Date Of Birth          1935        Visit Information        Provider Department      9/25/2018 2:00 PM Ifeanyi Rosario MD [facility]        Today's Diagnoses     Presbyopia    -  1    Pseudophakia, ou        Posterior vitreous detachment, bilateral        Retinal hemorrhage, unspecified laterality          Care Instructions    Glasses Rx given - optional.  Possible clouding of posterior capsule both eyes discussed.  Call in May 2019 for an appointment in September 2019 for Complete Exam.    Dr. Rosario [phone]            Follow-ups after your visit        Who to contact     If you have questions or need follow up information about today's clinic visit or your schedule please contact [facility] directly at [phone].  Normal or non-critical lab and imaging results will be communicated to you by Doppelgangerhart, letter or phone within 4 business days after the clinic has received the results. If you do not hear from us within 7 days, please contact the clinic through Doppelgangerhart or phone. If you have a critical or abnormal lab result, we will notify you by phone as soon as possible.  Submit refill requests through Kudan or call your pharmacy and they will forward the refill request to us. Please allow 3 business days for your refill to be completed.          Additional Information About Your Visit        MyChart Information     Kudan gives you secure access to your electronic health record. If you see a primary care provider, you can also send messages to your care team and make appointments. If you have questions, please call your primary care clinic.  If you do not have a primary care provider, please call [phone] and they will assist you.        Care EveryWhere ID     This is your Care EveryWhere ID. This could be used by other organizations to access your  [place] medical records  [id number]         Blood Pressure from Last 3 Encounters:   08/17/18 124/74   02/12/18 116/68   07/18/17 124/70    Weight from Last 3 Encounters:   08/17/18 80 kg (176 lb 6.4 oz)   07/18/17 79.4 kg (175 lb)   02/24/17 78.7 kg (173 lb 6.4 oz)              Today, you had the following     No orders found for display       Primary Care Provider Office Phone # Fax #    Luz Morel Nemesio Doe, STAR [facility] [phone] [address] [address]        Equal Access to Services     [facility] : Hadii joni miller hadashleyo Sonila, waaxda luqadaha, qaybta kaalmada adejoshyada, dorothy ash . So [facility] [phone].    ATENCIÓN: Si habla español, tiene a sharma disposición servicios gratuitos de asistencia lingüística. Llame al [phone].    We comply with applicable federal civil rights laws and [place] laws. We do not discriminate on the basis of race, color, national origin, age, disability, sex, sexual orientation, or gender identity.            Thank you!     Thank you for choosing [facility]  for your care. Our goal is always to provide you with excellent care. Hearing back from our patients is one way we can continue to improve our services. Please take a few minutes to complete the written survey that you may receive in the mail after your visit with us. Thank you!             Your Updated Medication List - Protect others around you: Learn how to safely use, store and throw away your medicines at www.disposemymeds.org.          This list is accurate as of 9/25/18  2:58 PM.  Always use your most recent med list.                   Brand Name Dispense Instructions for use Diagnosis    aspirin 325 MG tablet      Take 325 mg by mouth daily Patient takes 162.5 mg        carboxymethylcellulose 0.5 % Soln ophthalmic solution    REFRESH PLUS     Place 1 drop into both eyes daily as needed for dry eyes        lisinopril 2.5 MG tablet     PRINIVIL/Zestril    90 tablet    Take 1 tablet (2.5 mg) by mouth daily    Benign essential hypertension       metoprolol tartrate 25 MG tablet    LOPRESSOR    180 tablet    Take 1 tablet (25 mg) by mouth 2 times daily Office visit needed for further refills.    Benign essential hypertension       MULTI VITAMIN/MINERALS PO      Take 1 tablet by mouth once.        SIMVASTATIN PO      Take 20 mg by mouth At Bedtime

## 2018-10-19 ENCOUNTER — MYC REFILL (OUTPATIENT)
Dept: FAMILY MEDICINE | Facility: CLINIC | Age: 83
End: 2018-10-19

## 2018-10-19 DIAGNOSIS — I10 BENIGN ESSENTIAL HYPERTENSION: ICD-10-CM

## 2018-10-19 RX ORDER — LISINOPRIL 2.5 MG/1
2.5 TABLET ORAL DAILY
Qty: 90 TABLET | Refills: 1 | Status: SHIPPED | OUTPATIENT
Start: 2018-10-19 | End: 2018-12-27

## 2018-10-19 NOTE — TELEPHONE ENCOUNTER
Message from Alivia:  Original authorizing provider: STAR Courtney CNP would like a refill of the following medications:  lisinopril (PRINIVIL/ZESTRIL) 2.5 MG tablet [STAR Courtney CNP]    Preferred pharmacy: [facility] [address]    Comment:  Qty 90

## 2018-11-02 ENCOUNTER — MYC REFILL (OUTPATIENT)
Dept: FAMILY MEDICINE | Facility: CLINIC | Age: 83
End: 2018-11-02

## 2018-11-02 DIAGNOSIS — I10 BENIGN ESSENTIAL HYPERTENSION: ICD-10-CM

## 2018-11-02 RX ORDER — METOPROLOL TARTRATE 25 MG/1
25 TABLET, FILM COATED ORAL 2 TIMES DAILY
Qty: 180 TABLET | Refills: 2 | Status: SHIPPED | OUTPATIENT
Start: 2018-11-02 | End: 2019-08-02

## 2018-11-02 RX ORDER — METOPROLOL TARTRATE 25 MG/1
TABLET, FILM COATED ORAL
Qty: 180 TABLET | Refills: 0 | OUTPATIENT
Start: 2018-11-02

## 2018-11-02 NOTE — TELEPHONE ENCOUNTER
Refused Prescriptions:                       Disp   Refills    metoprolol tartrate (LOPRESSOR) 25 MG tabl*180 ta*0        Sig: TAKE 1 TABLET BY MOUTH TWICE DAILY .  OFFICE  VISIT            NEEDED  FOR  FURTHER  REFILLS  Refused By: ELIZABETH BARRERA  Reason for Refusal: Duplicate    Refused duplicate request. Closing encounter.    Elizabeth Barrera, RN

## 2018-12-14 ENCOUNTER — TRANSFERRED RECORDS (OUTPATIENT)
Dept: HEALTH INFORMATION MANAGEMENT | Facility: CLINIC | Age: 83
End: 2018-12-14

## 2018-12-14 LAB
CREAT SERPL-MCNC: 0.98 MG/DL (ref 0.57–1.11)
GFR SERPL CREATININE-BSD FRML MDRD: 54 ML/MIN/1.73M2
GLUCOSE SERPL-MCNC: 108 MG/DL (ref 65–100)
INR PPP: 1.1
POTASSIUM SERPL-SCNC: 4.4 MMOL/L (ref 3.5–5)

## 2018-12-15 ENCOUNTER — TRANSFERRED RECORDS (OUTPATIENT)
Dept: HEALTH INFORMATION MANAGEMENT | Facility: CLINIC | Age: 83
End: 2018-12-15
Payer: MEDICARE

## 2018-12-26 ENCOUNTER — TRANSFERRED RECORDS (OUTPATIENT)
Dept: HEALTH INFORMATION MANAGEMENT | Facility: CLINIC | Age: 83
End: 2018-12-26
Payer: MEDICARE

## 2018-12-26 ENCOUNTER — MYC REFILL (OUTPATIENT)
Dept: FAMILY MEDICINE | Facility: CLINIC | Age: 83
End: 2018-12-26

## 2018-12-26 DIAGNOSIS — I10 BENIGN ESSENTIAL HYPERTENSION: ICD-10-CM

## 2018-12-26 RX ORDER — LISINOPRIL 2.5 MG/1
2.5 TABLET ORAL DAILY
Qty: 90 TABLET | Refills: 1 | OUTPATIENT
Start: 2018-12-26

## 2018-12-26 NOTE — TELEPHONE ENCOUNTER
Refused Prescriptions:                       Disp   Refills    lisinopril (PRINIVIL/ZESTRIL) 2.5 MG [id number] tab*1        Sig: Take 1 tablet (2.5 mg) by mouth daily  Refused By: PEYMAN DISLA  Reason for Refusal: Duplicate  Reason for Refusal Comment: see rx sent 10/19/18    Confirmed with pharmacy this was duplicate request.    Peyman Disla, RN

## 2018-12-27 ENCOUNTER — MYC MEDICAL ADVICE (OUTPATIENT)
Dept: FAMILY MEDICINE | Facility: CLINIC | Age: 83
End: 2018-12-27

## 2018-12-27 DIAGNOSIS — I10 BENIGN ESSENTIAL HYPERTENSION: ICD-10-CM

## 2018-12-27 NOTE — TELEPHONE ENCOUNTER
Lisinopril was changed to 5mg in the hospital.  Pharmacy still has the 2.5mg dose.     Patient would like new prescription for 5mg dose sent    Prescription pended.    Kayleen Bustos RN

## 2018-12-28 RX ORDER — LISINOPRIL 5 MG/1
5 TABLET ORAL DAILY
Qty: 90 TABLET | Refills: 0 | Status: SHIPPED | OUTPATIENT
Start: 2018-12-28 | End: 2019-05-10

## 2019-01-02 ENCOUNTER — OFFICE VISIT (OUTPATIENT)
Dept: FAMILY MEDICINE | Facility: CLINIC | Age: 84
End: 2019-01-02
Payer: MEDICARE

## 2019-01-02 ENCOUNTER — MYC MEDICAL ADVICE (OUTPATIENT)
Dept: FAMILY MEDICINE | Facility: CLINIC | Age: 84
End: 2019-01-02

## 2019-01-02 VITALS
WEIGHT: 173.8 LBS | TEMPERATURE: 97.7 F | BODY MASS INDEX: 30.79 KG/M2 | HEART RATE: 76 BPM | RESPIRATION RATE: 16 BRPM | OXYGEN SATURATION: 98 % | DIASTOLIC BLOOD PRESSURE: 76 MMHG | SYSTOLIC BLOOD PRESSURE: 138 MMHG | HEIGHT: 63 IN

## 2019-01-02 DIAGNOSIS — I10 HYPERTENSION GOAL BP (BLOOD PRESSURE) < 140/90: ICD-10-CM

## 2019-01-02 DIAGNOSIS — D64.9 ANEMIA, UNSPECIFIED TYPE: ICD-10-CM

## 2019-01-02 DIAGNOSIS — S73.005D CLOSED DISLOCATION OF LEFT HIP, SUBSEQUENT ENCOUNTER: Primary | ICD-10-CM

## 2019-01-02 DIAGNOSIS — Z23 NEED FOR PROPHYLACTIC VACCINATION AND INOCULATION AGAINST INFLUENZA: ICD-10-CM

## 2019-01-02 LAB
ANION GAP SERPL CALCULATED.3IONS-SCNC: 8 MMOL/L (ref 3–14)
BUN SERPL-MCNC: 19 MG/DL (ref 7–30)
CALCIUM SERPL-MCNC: 9.4 MG/DL (ref 8.5–10.1)
CHLORIDE SERPL-SCNC: 101 MMOL/L (ref 94–109)
CO2 SERPL-SCNC: 26 MMOL/L (ref 20–32)
CREAT SERPL-MCNC: 1.07 MG/DL (ref 0.52–1.04)
GFR SERPL CREATININE-BSD FRML MDRD: 48 ML/MIN/{1.73_M2}
GLUCOSE SERPL-MCNC: 98 MG/DL (ref 70–99)
HGB BLD-MCNC: 12.6 G/DL (ref 11.7–15.7)
POTASSIUM SERPL-SCNC: 4.5 MMOL/L (ref 3.4–5.3)
SODIUM SERPL-SCNC: 135 MMOL/L (ref 133–144)

## 2019-01-02 PROCEDURE — 90662 IIV NO PRSV INCREASED AG IM: CPT | Performed by: NURSE PRACTITIONER

## 2019-01-02 PROCEDURE — 36415 COLL VENOUS BLD VENIPUNCTURE: CPT | Performed by: NURSE PRACTITIONER

## 2019-01-02 PROCEDURE — 99214 OFFICE O/P EST MOD 30 MIN: CPT | Mod: 25 | Performed by: NURSE PRACTITIONER

## 2019-01-02 PROCEDURE — 85018 HEMOGLOBIN: CPT | Performed by: NURSE PRACTITIONER

## 2019-01-02 PROCEDURE — 80048 BASIC METABOLIC PNL TOTAL CA: CPT | Performed by: NURSE PRACTITIONER

## 2019-01-02 PROCEDURE — G0008 ADMIN INFLUENZA VIRUS VAC: HCPCS | Performed by: NURSE PRACTITIONER

## 2019-01-02 RX ORDER — ACETAMINOPHEN 500 MG
500-1000 TABLET ORAL EVERY 6 HOURS PRN
COMMUNITY

## 2019-01-02 RX ORDER — ASPIRIN 81 MG/1
81 TABLET ORAL 2 TIMES DAILY
COMMUNITY

## 2019-01-02 RX ORDER — SIMVASTATIN 40 MG
40 TABLET ORAL
COMMUNITY
Start: 2018-10-16 | End: 2021-03-04

## 2019-01-02 ASSESSMENT — PAIN SCALES - GENERAL: PAINLEVEL: NO PAIN (0)

## 2019-01-02 ASSESSMENT — MIFFLIN-ST. JEOR: SCORE: 1209.54

## 2019-01-02 NOTE — PATIENT INSTRUCTIONS
[facility]    If you have any questions regarding to your visit please contact your care team:     Team Pink:   Clinic Hours Telephone Number   Internal Medicine:  Dr. Yissel Coughlin NP 7am-7pm  Monday - Thursday   7am-5pm  Fridays  [phone]  (Appointment scheduling available 24/7)   Urgent Care - [place] and [facility] - 11am-9pm Monday-Friday Saturday-Sunday- 9am-5pm   [place] - 5pm-9pm Monday-Friday Saturday-Sunday- 9am-5pm  [phone] - [place]  [phone] - [place]       What options do I have for a visit other than an office visit? We offer electronic visits (e-visits) and telephone visits, when medically appropriate.  Please check with your medical insurance to see if these types of visits are covered, as you will be responsible for any charges that are not paid by your insurance.      You can use AskU (secure electronic communication) to access to your chart, send your primary care provider a message, or make an appointment. Ask a team member how to get started.     For a price quote for your services, please call our Consumer Price Line at [phone] or our Imaging Cost estimation line at [phone] (for imaging tests).      Irene Casas, [facility]

## 2019-01-02 NOTE — PROGRESS NOTES
SUBJECTIVE:   Raeann Abdalla is a 83 year old female who presents to clinic today for the following health issues:      Chief Complaint   Patient presents with     Surgical Followup     on left hip from dislocation, [facility]     Health Maintenance     Due for shingles vacc, advanced care planning, flu vacc, PHQ2     HOSPITAL COURSE:  Please look at the admission history for more details. Ms. Abdalla is an 83-year-old female who presented with left hip pain. This comes as unclear. It appears she has fallen, but she says she was trying to get up off of a rocking chair when she all of a sudden had a significant pain. At any rate, imaging study in the emergency room showed a dislocated left prosthetic hip. A closed reduction was attempted in the emergency room, but was unsuccessful, so she was admitted. She went to surgery and anesthesia and it was reduced successfully.    She has significant dementia. She was disoriented most of the time in this hospital. She was seen by PT and OT and OT recommend that she go for a short-term rehabilitation.    Her other medical issues including coronary artery disease, aortic valve replacement, hypertension, all well under control.       Patient is doing well at home.  Blood pressure has been well controlled on higher dose of lisinopril.  Patient denies pain.  She denies any drainage from incision.      Problem list and histories reviewed & adjusted, as indicated.  Additional history: as documented    Patient Active Problem List   Diagnosis     Hypertension goal BP (blood pressure) < 140/90     S/P AVR (aortic valve replacement)     S/P CABG (coronary artery bypass graft)     Short-term memory loss     S/P bilateral hip replacements     History of right-sided carotid endarterectomy     CKD (chronic kidney disease) stage 3, GFR 30-59 ml/min (H)     Hyperlipidemia LDL goal <70     Coronary artery disease involving autologous artery coronary bypass graft without angina pectoris     Posterior  vitreous detachment, bilateral     Pseudophakia, ou     Hx of retinal hemorrhage     Past Surgical History:   Procedure Laterality Date     ABDOMEN SURGERY  Feb. 2014    aortic valve replacement     AORTIC VALVE REPLACEMENT       BYPASS GRAFT ARTERY CORONARY      X 3     CATARACT IOL, RT/LT       COLONOSCOPY  2010 ?    polyps found for first time     endarectomy Right     carotid     ENT SURGERY       HEAD & NECK SURGERY      rt carotid artery neck plaque removal     JOINT REPLACEMENT, HIP RT/LT Bilateral      PHACOEMULSIFICATION WITH STANDARD INTRAOCULAR LENS IMPLANT  07/2017; 8/2017    right eye; left eye     THORACIC SURGERY  2014    aortic valve replacement, triple bypass     TONSILLECTOMY N/A      VASCULAR SURGERY  2013    rt carotid artery neck plaque removal       Social History     Tobacco Use     Smoking status: Never Smoker     Smokeless tobacco: Never Used   Substance Use Topics     Alcohol use: Yes     Alcohol/week: 0.0 oz     Comment: 12 0z beer daily     Family History   Problem Relation Age of Onset     Other Cancer Sister      Macular Degeneration Mother 75     Other Cancer Mother         Ovarian     Coronary Artery Disease Father      Hypertension Father      Hyperlipidemia Father      Diabetes Father      Other Cancer Sister         cervical         Current Outpatient Medications   Medication Sig Dispense Refill     acetaminophen (TYLENOL) 500 MG tablet Take 500-1,000 mg by mouth every 6 hours as needed for mild pain       aspirin 81 MG EC tablet Take 81 mg by mouth 2 times daily       carboxymethylcellulose (REFRESH PLUS) 0.5 % SOLN Place 1 drop into both eyes daily as needed for dry eyes       lisinopril (PRINIVIL/ZESTRIL) 5 MG tablet Take 1 tablet (5 mg) by mouth daily 90 tablet 0     metoprolol tartrate (LOPRESSOR) 25 MG tablet Take 1 tablet (25 mg) by mouth 2 times daily Office visit needed for further refills. 180 tablet 2     Multiple Vitamins-Minerals (MULTI VITAMIN/MINERALS PO) Take 1  "tablet by mouth once.       SIMVASTATIN PO Take 20 mg by mouth At Bedtime        Allergies   Allergen Reactions     Sulfa Drugs      BP Readings from Last 3 Encounters:   01/02/19 138/76   08/17/18 124/74   02/12/18 116/68    Wt Readings from Last 3 Encounters:   01/02/19 78.8 kg (173 lb 12.8 oz)   08/17/18 80 kg (176 lb 6.4 oz)   07/18/17 79.4 kg (175 lb)                  Labs reviewed in EPIC    Reviewed and updated as needed this visit by clinical staff       Reviewed and updated as needed this visit by Provider         ROS:  Constitutional, HEENT, cardiovascular, pulmonary, gi and gu systems are negative, except as otherwise noted.    OBJECTIVE:     /76 (BP Location: Right arm, Cuff Size: Adult Large)   Pulse 76   Temp 97.7  F (36.5  C) (Oral)   Resp 16   Ht 1.588 m (5' 2.5\")   Wt 78.8 kg (173 lb 12.8 oz)   SpO2 98%   Breastfeeding? No   BMI 31.28 kg/m    Body mass index is 31.28 kg/m .  GENERAL: healthy, alert and no distress  RESP: lungs clear to auscultation - no rales, rhonchi or wheezes  CV: regular rate and rhythm, normal S1 S2, no S3 or S4, no murmur, click or rub, no peripheral edema and peripheral pulses strong  MS: no gross musculoskeletal defects noted, no edema  SKIN: Left hip incision- no drainage, no surrounding erythema or warmth noted      Diagnostic Test Results:  pending    ASSESSMENT/PLAN:     1. Closed dislocation of left hip, subsequent encounter  Patient doing well post-operatively.    2. Hypertension goal BP (blood pressure) < 140/90  Stable.  Continue current treatment plan and medications.    - Basic metabolic panel    3. Anemia, unspecified type  Recheck today.  - Hemoglobin    4. Need for prophylactic vaccination and inoculation against influenza    - FLU VACCINE, INCREASED ANTIGEN, PRESV FREE, AGE 65+ [96464]  - Vaccine Administration, Initial [63434]    FUTURE APPOINTMENTS:       - Follow-up for annual visit or as needed    STAR Courtney [facility] " [place]

## 2019-01-02 NOTE — PROGRESS NOTES

## 2019-01-03 NOTE — TELEPHONE ENCOUNTER
Provider sent in a prescription for Lisinopril 5mg daily on 12/28/18  We did not send any other Lisinopril prescription on 12/27/18 and have not sent in the 2.5mg dose since 10/19/18        Kayleen Bustos RN

## 2019-01-16 DIAGNOSIS — N28.9 DECREASED RENAL FUNCTION: ICD-10-CM

## 2019-01-16 LAB
ANION GAP SERPL CALCULATED.3IONS-SCNC: 5 MMOL/L (ref 3–14)
BUN SERPL-MCNC: 19 MG/DL (ref 7–30)
CALCIUM SERPL-MCNC: 9.1 MG/DL (ref 8.5–10.1)
CHLORIDE SERPL-SCNC: 100 MMOL/L (ref 94–109)
CO2 SERPL-SCNC: 29 MMOL/L (ref 20–32)
CREAT SERPL-MCNC: 1.09 MG/DL (ref 0.52–1.04)
GFR SERPL CREATININE-BSD FRML MDRD: 47 ML/MIN/{1.73_M2}
GLUCOSE SERPL-MCNC: 100 MG/DL (ref 70–99)
POTASSIUM SERPL-SCNC: 5.1 MMOL/L (ref 3.4–5.3)
SODIUM SERPL-SCNC: 134 MMOL/L (ref 133–144)

## 2019-01-16 PROCEDURE — 36415 COLL VENOUS BLD VENIPUNCTURE: CPT | Performed by: NURSE PRACTITIONER

## 2019-01-16 PROCEDURE — 80048 BASIC METABOLIC PNL TOTAL CA: CPT | Performed by: NURSE PRACTITIONER

## 2019-01-17 NOTE — RESULT ENCOUNTER NOTE
Dear Raeann,    Your recent test results are attached.      Kidney function is stable.  Continue current meds.  We'll recheck at next appointment.    If you have any questions please feel free to contact [phone] or myself via [employer].    Sincerely,  Luz Coughlin, CNP

## 2019-03-27 ENCOUNTER — OFFICE VISIT (OUTPATIENT)
Dept: FAMILY MEDICINE | Facility: CLINIC | Age: 84
End: 2019-03-27
Payer: MEDICARE

## 2019-03-27 VITALS
WEIGHT: 174.2 LBS | DIASTOLIC BLOOD PRESSURE: 74 MMHG | HEIGHT: 63 IN | OXYGEN SATURATION: 100 % | BODY MASS INDEX: 30.87 KG/M2 | TEMPERATURE: 96.5 F | SYSTOLIC BLOOD PRESSURE: 152 MMHG | HEART RATE: 69 BPM | RESPIRATION RATE: 17 BRPM

## 2019-03-27 DIAGNOSIS — R10.11 RUQ ABDOMINAL PAIN: Primary | ICD-10-CM

## 2019-03-27 DIAGNOSIS — K80.20 CALCULUS OF GALLBLADDER WITHOUT CHOLECYSTITIS WITHOUT OBSTRUCTION: ICD-10-CM

## 2019-03-27 DIAGNOSIS — N30.00 ACUTE CYSTITIS WITHOUT HEMATURIA: ICD-10-CM

## 2019-03-27 LAB
ALBUMIN UR-MCNC: NEGATIVE MG/DL
APPEARANCE UR: CLEAR
BILIRUB UR QL STRIP: NEGATIVE
COLOR UR AUTO: YELLOW
GLUCOSE UR STRIP-MCNC: NEGATIVE MG/DL
HGB UR QL STRIP: NEGATIVE
KETONES UR STRIP-MCNC: NEGATIVE MG/DL
LEUKOCYTE ESTERASE UR QL STRIP: NEGATIVE
NITRATE UR QL: NEGATIVE
PH UR STRIP: 6.5 PH (ref 5–7)
SOURCE: NORMAL
SP GR UR STRIP: 1.01 (ref 1–1.03)
UROBILINOGEN UR STRIP-ACNC: 0.2 EU/DL (ref 0.2–1)

## 2019-03-27 PROCEDURE — 81003 URINALYSIS AUTO W/O SCOPE: CPT | Performed by: NURSE PRACTITIONER

## 2019-03-27 PROCEDURE — 99213 OFFICE O/P EST LOW 20 MIN: CPT | Performed by: NURSE PRACTITIONER

## 2019-03-27 ASSESSMENT — MIFFLIN-ST. JEOR: SCORE: 1211.36

## 2019-03-27 NOTE — PROGRESS NOTES
SUBJECTIVE:   Raeann Abdalla is a 83 year old female who presents to clinic today for the following health issues:    ED/UC Followup:    Facility:  [facility]  Date of visit: 3/16/2019  Reason for visit: Gall Bladder Symptoms - Severe pain in rib cage  Current Status: Patient states she is not having any symptoms she never was to begin with they just found the UTI at the hospital     Impression and Plan:  Raeann Abdalla is a 83 y.o. female with past medical history significant for coronary artery disease, aortic valve replacement, comes to the emergency department with sharp right upper quadrant abdominal pain that began while eating cheesy fries. Right upper quadrant ultrasound negative for cholecystitis or obstructive biliary disease. CT scan negative for impacted stone in the kidney or other acute intraabdominal process. While here her pain resolved. Urine however is infected although I am suspicious that her pain has to do with transient biliary colic rather than a kidney infection, I am opting to treat her with 7 days of Keflex TID. Specific reasons to return to the emergency department were described in detail to her and her . All of her questions were answered to the best of my ability. Mr. Abdalla voiced his discomfort with the lack of definitive diagnosis, however I explained that is why follow up is so important.    Diagnosis:  ENCOUNTER DIAGNOSES   ICD-10-CM   1. Abdominal pain, unspecified abdominal location R10.9   2. Acute cystitis without hematuria N30.00 cephalexin (KEFLEX) 500 mg capsule   cephalexin (KEFLEX) 500 mg capsule      Patient denies any further episodes of RUQ pain.  She denies nausea, vomiting, dysuria.  She has completed her course of antibiotics.  She feels well overall and is in her general state of health.      Problem list and histories reviewed & adjusted, as indicated.  Additional history: as documented    Patient Active Problem List   Diagnosis     Hypertension goal BP (blood  pressure) < 140/90     S/P AVR (aortic valve replacement)     S/P CABG (coronary artery bypass graft)     Short-term memory loss     S/P bilateral hip replacements     History of right-sided carotid endarterectomy     CKD (chronic kidney disease) stage 3, GFR 30-59 ml/min (H)     Hyperlipidemia LDL goal <70     Coronary artery disease involving autologous artery coronary bypass graft without angina pectoris     Posterior vitreous detachment, bilateral     Pseudophakia, ou     Hx of retinal hemorrhage     Past Surgical History:   Procedure Laterality Date     ABDOMEN SURGERY  Feb. 2014    aortic valve replacement     AORTIC VALVE REPLACEMENT       BYPASS GRAFT ARTERY CORONARY      X 3     CATARACT IOL, RT/LT       COLONOSCOPY  2010 ?    polyps found for first time     endarectomy Right     carotid     ENT SURGERY       HEAD & NECK SURGERY      rt carotid artery neck plaque removal     JOINT REPLACEMENT, HIP RT/LT Bilateral      PHACOEMULSIFICATION WITH STANDARD INTRAOCULAR LENS IMPLANT  07/2017; 8/2017    right eye; left eye     THORACIC SURGERY  2014    aortic valve replacement, triple bypass     TONSILLECTOMY N/A      VASCULAR SURGERY  2013    rt carotid artery neck plaque removal       Social History     Tobacco Use     Smoking status: Never Smoker     Smokeless tobacco: Never Used   Substance Use Topics     Alcohol use: Yes     Alcohol/week: 0.0 oz     Comment: 12 0z beer daily     Family History   Problem Relation Age of Onset     Other Cancer Sister      Macular Degeneration Mother 75     Other Cancer Mother         Ovarian     Coronary Artery Disease Father      Hypertension Father      Hyperlipidemia Father      Diabetes Father      Other Cancer Sister         cervical         Current Outpatient Medications   Medication Sig Dispense Refill     acetaminophen (TYLENOL) 500 MG tablet Take 500-1,000 mg by mouth every 6 hours as needed for mild pain       aspirin 81 MG EC tablet Take 81 mg by mouth 2 times daily    "    carboxymethylcellulose (REFRESH PLUS) 0.5 % SOLN Place 1 drop into both eyes daily as needed for dry eyes       lisinopril (PRINIVIL/ZESTRIL) 5 MG tablet Take 1 tablet (5 mg) by mouth daily 90 tablet 0     metoprolol tartrate (LOPRESSOR) 25 MG tablet Take 1 tablet (25 mg) by mouth 2 times daily Office visit needed for further refills. 180 tablet 2     Multiple Vitamins-Minerals (MULTI VITAMIN/MINERALS PO) Take 1 tablet by mouth once.       simvastatin (ZOCOR) 40 MG tablet Take 40 mg by mouth       Allergies   Allergen Reactions     Sulfa Drugs      BP Readings from Last 3 Encounters:   03/27/19 152/74   01/02/19 138/76   08/17/18 124/74    Wt Readings from Last 3 Encounters:   03/27/19 79 kg (174 lb 3.2 oz)   01/02/19 78.8 kg (173 lb 12.8 oz)   08/17/18 80 kg (176 lb 6.4 oz)                  Labs reviewed in EPIC    Reviewed and updated as needed this visit by clinical staff       Reviewed and updated as needed this visit by Provider         ROS:  Constitutional, HEENT, cardiovascular, pulmonary, gi and gu systems are negative, except as otherwise noted.    OBJECTIVE:     /74   Pulse 69   Temp 96.5  F (35.8  C) (Oral)   Resp 17   Ht 1.588 m (5' 2.5\")   Wt 79 kg (174 lb 3.2 oz)   SpO2 100%   BMI 31.35 kg/m    Body mass index is 31.35 kg/m .  GENERAL: healthy, alert and no distress  RESP: lungs clear to auscultation - no rales, rhonchi or wheezes  CV: regular rate and rhythm, normal S1 S2, no S3 or S4, no murmur, click or rub, no peripheral edema and peripheral pulses strong  ABDOMEN: soft, nontender, no hepatosplenomegaly, no masses and bowel sounds normal  MS: no gross musculoskeletal defects noted, no edema    Diagnostic Test Results:  Results for orders placed or performed in visit on 03/27/19 (from the past 24 hour(s))   *UA reflex to Microscopic and Culture ([place] [facility] (except [place] and [place])   Result Value Ref Range    Color Urine Yellow     Appearance Urine Clear     " Glucose Urine Negative NEG^Negative mg/dL    Bilirubin Urine Negative NEG^Negative    Ketones Urine Negative NEG^Negative mg/dL    Specific Gravity Urine 1.015 1.003 - 1.035    Blood Urine Negative NEG^Negative    pH Urine 6.5 5.0 - 7.0 pH    Protein Albumin Urine Negative NEG^Negative mg/dL    Urobilinogen Urine 0.2 0.2 - 1.0 EU/dL    Nitrite Urine Negative NEG^Negative    Leukocyte Esterase Urine Negative NEG^Negative    Source Midstream Urine        ASSESSMENT/PLAN:     1. RUQ abdominal pain  Patient to monitor at this time.  If pain reoccurs, consider referral to general surgery to discuss cholecystectomy.    2. Acute cystitis without hematuria  Resolved.  - *UA reflex to Microscopic and Culture ([place] and [facility] (except [place] and [place])    3. Calculus of gallbladder without cholecystitis without obstruction  As above.       FUTURE APPOINTMENTS:       - Follow-up for annual visit or as needed    STAR Courtney [facility], [place]

## 2019-04-18 ENCOUNTER — TELEPHONE (OUTPATIENT)
Dept: FAMILY MEDICINE | Facility: CLINIC | Age: 84
End: 2019-04-18

## 2019-04-18 NOTE — TELEPHONE ENCOUNTER
Panel Management Review      Patient has the following on her problem list:     Hypertension   Last three blood pressure readings:  BP Readings from Last 3 Encounters:   03/27/19 152/74   01/02/19 138/76   08/17/18 124/74     Blood pressure: FAILED    HTN Guidelines:  Less than 140/90      Composite cancer screening  Chart review shows that this patient is due/due soon for the following None  Summary:    Patient is due/failing the following:   BP CHECK    Action needed:   Patient needs office visit for Hypertension in 8/2/19.    Type of outreach:    none    Questions for provider review:    None                                                                                                                                    Maryjane MEYERS CMA       Chart routed to none .

## 2019-04-24 ENCOUNTER — DOCUMENTATION ONLY (OUTPATIENT)
Dept: OPHTHALMOLOGY | Facility: CLINIC | Age: 84
End: 2019-04-24

## 2019-05-10 ENCOUNTER — MYC REFILL (OUTPATIENT)
Dept: FAMILY MEDICINE | Facility: CLINIC | Age: 84
End: 2019-05-10

## 2019-05-10 DIAGNOSIS — I10 BENIGN ESSENTIAL HYPERTENSION: ICD-10-CM

## 2019-05-10 RX ORDER — LISINOPRIL 5 MG/1
TABLET ORAL
Qty: 90 TABLET | Refills: 0 | OUTPATIENT
Start: 2019-05-10

## 2019-05-10 RX ORDER — LISINOPRIL 5 MG/1
5 TABLET ORAL DAILY
Qty: 90 TABLET | Refills: 0 | Status: CANCELLED | OUTPATIENT
Start: 2019-05-10

## 2019-05-10 RX ORDER — LISINOPRIL 5 MG/1
5 TABLET ORAL DAILY
Qty: 90 TABLET | Refills: 1 | Status: SHIPPED | OUTPATIENT
Start: 2019-05-10 | End: 2019-08-02

## 2019-05-10 NOTE — TELEPHONE ENCOUNTER
"Routing refill request to provider for review/approval because:  Labs out of range:  BP, Cr    Requested Prescriptions   Pending Prescriptions Disp Refills     lisinopril (PRINIVIL/ZESTRIL) 5 MG tablet [Pharmacy Med Name: LISINOPRIL 5MG      TAB] 90 tablet 0     Sig: TAKE 1 TABLET BY MOUTH ONCE DAILY       ACE Inhibitors (Including Combos) Protocol Failed - 5/10/2019  8:23 AM        Failed - Blood pressure under 140/90 in past 12 months     BP Readings from Last 3 Encounters:   03/27/19 152/74   01/02/19 138/76   08/17/18 124/74                 Failed - Normal serum creatinine on file in past 12 months     Recent Labs   Lab Test 01/16/19  1445   CR 1.09*             Passed - Recent (12 mo) or future (30 days) visit within the authorizing provider's specialty     Patient had office visit in the last 12 months or has a visit in the next 30 days with authorizing provider or within the authorizing provider's specialty.  See \"Patient Info\" tab in inbasket, or \"Choose Columns\" in Meds & Orders section of the refill encounter.              Passed - Medication is active on med list        Passed - Patient is age 18 or older        Passed - No active pregnancy on record        Passed - Normal serum potassium on file in past 12 months     Recent Labs   Lab Test 01/16/19  1445   POTASSIUM 5.1             Passed - No positive pregnancy test within past 12 months        Bianka Perez RN  "

## 2019-05-10 NOTE — TELEPHONE ENCOUNTER
"Routing refill request to provider for review/approval because:  Labs out of range:  BP, Cr    Requested Prescriptions   Pending Prescriptions Disp Refills     lisinopril (PRINIVIL/ZESTRIL) 5 MG tablet 90 tablet 0     Sig: Take 1 tablet (5 mg) by mouth daily       ACE Inhibitors (Including Combos) Protocol Failed - 5/10/2019  8:39 AM        Failed - Blood pressure under 140/90 in past 12 months     BP Readings from Last 3 Encounters:   03/27/19 152/74   01/02/19 138/76   08/17/18 124/74                 Failed - Normal serum creatinine on file in past 12 months     Recent Labs   Lab Test 01/16/19  1445   CR 1.09*             Passed - Recent (12 mo) or future (30 days) visit within the authorizing provider's specialty     Patient had office visit in the last 12 months or has a visit in the next 30 days with authorizing provider or within the authorizing provider's specialty.  See \"Patient Info\" tab in inbasket, or \"Choose Columns\" in Meds & Orders section of the refill encounter.              Passed - Medication is active on med list        Passed - Patient is age 18 or older        Passed - No active pregnancy on record        Passed - Normal serum potassium on file in past 12 months     Recent Labs   Lab Test 01/16/19  1445   POTASSIUM 5.1             Passed - No positive pregnancy test within past 12 months        Bianka Perez RN  "

## 2019-07-31 ENCOUNTER — ALLIED HEALTH/NURSE VISIT (OUTPATIENT)
Dept: NURSING | Facility: CLINIC | Age: 84
End: 2019-07-31
Payer: MEDICARE

## 2019-07-31 ENCOUNTER — OFFICE VISIT (OUTPATIENT)
Dept: FAMILY MEDICINE | Facility: CLINIC | Age: 84
End: 2019-07-31
Payer: MEDICARE

## 2019-07-31 VITALS
DIASTOLIC BLOOD PRESSURE: 68 MMHG | SYSTOLIC BLOOD PRESSURE: 120 MMHG | TEMPERATURE: 97.2 F | RESPIRATION RATE: 18 BRPM | BODY MASS INDEX: 30.41 KG/M2 | OXYGEN SATURATION: 97 % | HEIGHT: 63 IN | WEIGHT: 171.6 LBS | HEART RATE: 67 BPM

## 2019-07-31 VITALS
RESPIRATION RATE: 18 BRPM | HEART RATE: 67 BPM | TEMPERATURE: 97.2 F | DIASTOLIC BLOOD PRESSURE: 68 MMHG | SYSTOLIC BLOOD PRESSURE: 120 MMHG | OXYGEN SATURATION: 97 %

## 2019-07-31 DIAGNOSIS — M79.89 SWELLING OF LIMB: Primary | ICD-10-CM

## 2019-07-31 DIAGNOSIS — L29.9 ITCHING: ICD-10-CM

## 2019-07-31 DIAGNOSIS — I10 BENIGN ESSENTIAL HYPERTENSION: ICD-10-CM

## 2019-07-31 DIAGNOSIS — T78.40XA ALLERGIC REACTION, INITIAL ENCOUNTER: Primary | ICD-10-CM

## 2019-07-31 PROCEDURE — 99207 ZZC NO CHARGE NURSE ONLY: CPT

## 2019-07-31 PROCEDURE — 99213 OFFICE O/P EST LOW 20 MIN: CPT | Performed by: NURSE PRACTITIONER

## 2019-07-31 RX ORDER — PREDNISONE 20 MG/1
20 TABLET ORAL DAILY
Qty: 5 TABLET | Refills: 0 | Status: SHIPPED | OUTPATIENT
Start: 2019-07-31 | End: 2019-08-05

## 2019-07-31 ASSESSMENT — PAIN SCALES - GENERAL: PAINLEVEL: NO PAIN (0)

## 2019-07-31 ASSESSMENT — MIFFLIN-ST. JEOR: SCORE: 1194.56

## 2019-07-31 NOTE — PROGRESS NOTES
"Raeann Abdalla is a 84 year old female who presents with left arm swelling.  She is accompanied by her spouse    NURSING ASSESSMENT:  Description:  Patient was outside in her yard yesterday and her right arm from her hand to her underarm became itchy and almost instantly swollen. Spouse is with her and stated that patient has short term memory loss and does not recall exactly what she was doing when symptoms come on suddenly. Spouse answers most of patient's questions and she does not appear to be too bothered by her symptoms besides the itchiness. She is able to refrain from scratching. Skin appears intact but visibly swollen and red. No raised areas, hives, bumps, open wounds, insect bites noted. ROM intact. Denies any throat or tongue swelling. Denies SOB. If feeling fine otherwise. The itching is her main complaint   Onset/duration:  Yesterday   Precip. factors:  While she was outside. Unsure of cause  Associated symptoms:  See above  Improves/worsens symptoms:  Nothing   Pain scale (0-10)   0/10  LMP/preg/breast feeding:    Last exam/Treatment:  3/27/19  Allergies:   Allergies   Allergen Reactions     Sulfa Drugs        MEDICATIONS:   Taking medication(s) as prescribed? N/A  Taking over the counter medication(s?) tried hydrocortisone cream and calamine lotion  Any medication side effects? No significant side effects    Any barriers to taking medication(s) as prescribed?  No  Medication(s) improving/managing symptoms?  No  Medication reconciliation completed: N/A    NURSING PLAN: huddled with Luz Coughlin NP and she gave approval to add patient on at this time    RECOMMENDED DISPOSITION:  Patient will wait to be seen by Luz Coughlin NP now  Will comply with recommendation: Yes     Spouse angry that provider is not able to see patient immediately  He stood up telling patient that, \"next time, I'm just going to bring you to [place] ([facility]"  He then looked at RN stating, \"You are about to lose 2 " "patients\"  Explained to patient and spouse that [facility] is not an urgent care and we take appointments only but Luz Coughlin NP is trying her best to work patient in and will be in to see patient as soon as she can  Spouse refused to schedule, demanding an estimated time that provider will come into the room to see patient otherwise, they will leave  Explained that Luz Coughlin NP has scheduled patients that she will need to see and it would depend on how long those appointments will take but she will try her best to see this patient asap between patients.   Explained that writer will ask CMA to start the rooming process asap first  Spouse again refused to schedule, stating, \"I want an estimated time\"  Explained that writer will have CMA come in to let them know  He eventually agreed to schedule patient prior to getting an estimated time patient will be seen and have CMA come in to start rooming process    If further questions/concerns or if symptoms do not improve, worsen or new symptoms develop, call your PCP or [facility] as soon as possible.      Guideline used:  Telephone Triage Protocols for Nurses, Fifth Edition, Erika Bustos RN    "

## 2019-07-31 NOTE — PROGRESS NOTES
Marga Abdalla is a 84 year old female who presents to clinic today for the following health issues:    HPI     Chief Complaint   Patient presents with     Swelling     rt arm and hand, itchy started last night it looks worst than yesterday, calamine lotion and hydrocrtisone but didn't work     Patient notes that she was working in the garden and developed swelling, redness, and pruritis to her right forearm and upper arm yesterday.  She is unsure if she was stung by an insect or if reaction is related to contact with a plant.  She denies fever.            Patient Active Problem List   Diagnosis     Hypertension goal BP (blood pressure) < 140/90     S/P AVR (aortic valve replacement)     S/P CABG (coronary artery bypass graft)     Short-term memory loss     S/P bilateral hip replacements     History of right-sided carotid endarterectomy     CKD (chronic kidney disease) stage 3, GFR 30-59 ml/min (H)     Hyperlipidemia LDL goal <70     Coronary artery disease involving autologous artery coronary bypass graft without angina pectoris     Posterior vitreous detachment, bilateral     Pseudophakia, ou     Hx of retinal hemorrhage     Past Surgical History:   Procedure Laterality Date     ABDOMEN SURGERY  Feb. 2014    aortic valve replacement     AORTIC VALVE REPLACEMENT       BYPASS GRAFT ARTERY CORONARY      X 3     CATARACT IOL, RT/LT       COLONOSCOPY  2010 ?    polyps found for first time     endarectomy Right     carotid     ENT SURGERY       HEAD & NECK SURGERY      rt carotid artery neck plaque removal     JOINT REPLACEMENT, HIP RT/LT Bilateral      PHACOEMULSIFICATION WITH STANDARD INTRAOCULAR LENS IMPLANT  07/2017; 8/2017    right eye; left eye     THORACIC SURGERY  2014    aortic valve replacement, triple bypass     TONSILLECTOMY N/A      VASCULAR SURGERY  2013    rt carotid artery neck plaque removal       Social History     Tobacco Use     Smoking status: Never Smoker     Smokeless tobacco: Never  Used   Substance Use Topics     Alcohol use: Yes     Alcohol/week: 0.0 oz     Comment: 12 0z beer daily     Family History   Problem Relation Age of Onset     Other Cancer Sister      Macular Degeneration Mother 75     Other Cancer Mother         Ovarian     Coronary Artery Disease Father      Hypertension Father      Hyperlipidemia Father      Diabetes Father      Other Cancer Sister         cervical         Current Outpatient Medications   Medication Sig Dispense Refill     acetaminophen (TYLENOL) 500 MG tablet Take 500-1,000 mg by mouth every 6 hours as needed for mild pain       aspirin 81 MG EC tablet Take 81 mg by mouth 2 times daily       lisinopril (PRINIVIL/ZESTRIL) 5 MG tablet Take 1 tablet (5 mg) by mouth daily 90 tablet 1     metoprolol tartrate (LOPRESSOR) 25 MG tablet Take 1 tablet (25 mg) by mouth 2 times daily Office visit needed for further refills. 180 tablet 2     Multiple Vitamins-Minerals (MULTI VITAMIN/MINERALS PO) Take 1 tablet by mouth once.       predniSONE (DELTASONE) 20 MG tablet Take 1 tablet (20 mg) by mouth daily for 5 days 5 tablet 0     simvastatin (ZOCOR) 40 MG tablet Take 40 mg by mouth       carboxymethylcellulose (REFRESH PLUS) 0.5 % SOLN Place 1 drop into both eyes daily as needed for dry eyes       Allergies   Allergen Reactions     Sulfa Drugs      BP Readings from Last 3 Encounters:   07/31/19 120/68   07/31/19 120/68   03/27/19 152/74    Wt Readings from Last 3 Encounters:   07/31/19 77.8 kg (171 lb 9.6 oz)   03/27/19 79 kg (174 lb 3.2 oz)   01/02/19 78.8 kg (173 lb 12.8 oz)                    Reviewed and updated as needed this visit by Provider  Tobacco  Allergies  Meds  Problems  Med Hx  Surg Hx  Fam Hx         Review of Systems   ROS COMP: Constitutional, HEENT, cardiovascular, pulmonary, gi and gu systems are negative, except as otherwise noted.      Objective    /68 (BP Location: Left arm, Patient Position: Chair, Cuff Size: Adult Regular)   Pulse 67    "Temp 97.2  F (36.2  C) (Oral)   Resp 18   Ht 1.588 m (5' 2.5\")   Wt 77.8 kg (171 lb 9.6 oz)   SpO2 97%   BMI 30.89 kg/m    Body mass index is 30.89 kg/m .  Physical Exam   GENERAL: healthy, alert and no distress  RESP: lungs clear to auscultation - no rales, rhonchi or wheezes  CV: regular rate and rhythm, normal S1 S2, no S3 or S4, no murmur, click or rub, no peripheral edema and peripheral pulses strong  MS: no gross musculoskeletal defects noted, no edema  SKIN: large area of induration noted to right hand, forearm, and upper arm.  No warmth noted.    Diagnostic Test Results:  none         Assessment & Plan     1. Allergic reaction, initial encounter  Patient to use over the counter Claritin or cetirizine for pruritis.  - predniSONE (DELTASONE) 20 MG tablet; Take 1 tablet (20 mg) by mouth daily for 5 days  Dispense: 5 tablet; Refill: 0           Return in about 2 days (around 8/2/2019) for previously scheduled appointment with PCP..    STAR Courtney [facility]    "

## 2019-08-01 ASSESSMENT — ENCOUNTER SYMPTOMS
FEVER: 0
WEAKNESS: 0
DYSURIA: 0
COUGH: 0
ARTHRALGIAS: 0
BREAST MASS: 0
HEARTBURN: 0
DIARRHEA: 0
HEMATOCHEZIA: 0
NERVOUS/ANXIOUS: 0
PALPITATIONS: 0
SHORTNESS OF BREATH: 0
PARESTHESIAS: 0
CONSTIPATION: 0
NAUSEA: 0
ABDOMINAL PAIN: 0
MYALGIAS: 0
SORE THROAT: 0
FREQUENCY: 0
HEADACHES: 0
HEMATURIA: 0
EYE PAIN: 0
JOINT SWELLING: 0
DIZZINESS: 0
CHILLS: 0

## 2019-08-01 ASSESSMENT — ACTIVITIES OF DAILY LIVING (ADL): CURRENT_FUNCTION: NO ASSISTANCE NEEDED

## 2019-08-02 ENCOUNTER — OFFICE VISIT (OUTPATIENT)
Dept: FAMILY MEDICINE | Facility: CLINIC | Age: 84
End: 2019-08-02
Payer: MEDICARE

## 2019-08-02 VITALS
HEIGHT: 63 IN | TEMPERATURE: 98.3 F | SYSTOLIC BLOOD PRESSURE: 124 MMHG | BODY MASS INDEX: 30.26 KG/M2 | HEART RATE: 72 BPM | OXYGEN SATURATION: 96 % | DIASTOLIC BLOOD PRESSURE: 86 MMHG | WEIGHT: 170.8 LBS | RESPIRATION RATE: 20 BRPM

## 2019-08-02 DIAGNOSIS — E78.5 HYPERLIPIDEMIA LDL GOAL <70: ICD-10-CM

## 2019-08-02 DIAGNOSIS — Z00.00 ENCOUNTER FOR MEDICARE ANNUAL WELLNESS EXAM: Primary | ICD-10-CM

## 2019-08-02 DIAGNOSIS — I10 BENIGN ESSENTIAL HYPERTENSION: ICD-10-CM

## 2019-08-02 DIAGNOSIS — Z71.89 ADVANCED CARE PLANNING/COUNSELING DISCUSSION: ICD-10-CM

## 2019-08-02 DIAGNOSIS — N18.30 CKD (CHRONIC KIDNEY DISEASE) STAGE 3, GFR 30-59 ML/MIN (H): ICD-10-CM

## 2019-08-02 DIAGNOSIS — I25.810 CORONARY ARTERY DISEASE INVOLVING AUTOLOGOUS ARTERY CORONARY BYPASS GRAFT WITHOUT ANGINA PECTORIS: ICD-10-CM

## 2019-08-02 LAB
ALBUMIN SERPL-MCNC: 3.9 G/DL (ref 3.4–5)
ANION GAP SERPL CALCULATED.3IONS-SCNC: 6 MMOL/L (ref 3–14)
BUN SERPL-MCNC: 22 MG/DL (ref 7–30)
CALCIUM SERPL-MCNC: 9.3 MG/DL (ref 8.5–10.1)
CHLORIDE SERPL-SCNC: 104 MMOL/L (ref 94–109)
CHOLEST SERPL-MCNC: 127 MG/DL
CO2 SERPL-SCNC: 28 MMOL/L (ref 20–32)
CREAT SERPL-MCNC: 1 MG/DL (ref 0.52–1.04)
DEPRECATED CALCIDIOL+CALCIFEROL SERPL-MC: 34 UG/L (ref 20–75)
GFR SERPL CREATININE-BSD FRML MDRD: 52 ML/MIN/{1.73_M2}
GLUCOSE SERPL-MCNC: 94 MG/DL (ref 70–99)
HDLC SERPL-MCNC: 54 MG/DL
HGB BLD-MCNC: 13.8 G/DL (ref 11.7–15.7)
LDLC SERPL CALC-MCNC: 47 MG/DL
NONHDLC SERPL-MCNC: 73 MG/DL
PHOSPHATE SERPL-MCNC: 3.9 MG/DL (ref 2.5–4.5)
POTASSIUM SERPL-SCNC: 4.3 MMOL/L (ref 3.4–5.3)
PTH-INTACT SERPL-MCNC: 48 PG/ML (ref 18–80)
SODIUM SERPL-SCNC: 138 MMOL/L (ref 133–144)
TRIGL SERPL-MCNC: 132 MG/DL

## 2019-08-02 PROCEDURE — 85018 HEMOGLOBIN: CPT | Performed by: NURSE PRACTITIONER

## 2019-08-02 PROCEDURE — 83970 ASSAY OF PARATHORMONE: CPT | Performed by: NURSE PRACTITIONER

## 2019-08-02 PROCEDURE — 82306 VITAMIN D 25 HYDROXY: CPT | Performed by: NURSE PRACTITIONER

## 2019-08-02 PROCEDURE — G0439 PPPS, SUBSEQ VISIT: HCPCS | Performed by: NURSE PRACTITIONER

## 2019-08-02 PROCEDURE — 36415 COLL VENOUS BLD VENIPUNCTURE: CPT | Performed by: NURSE PRACTITIONER

## 2019-08-02 PROCEDURE — 80061 LIPID PANEL: CPT | Performed by: NURSE PRACTITIONER

## 2019-08-02 PROCEDURE — 80069 RENAL FUNCTION PANEL: CPT | Performed by: NURSE PRACTITIONER

## 2019-08-02 RX ORDER — LISINOPRIL 5 MG/1
5 TABLET ORAL DAILY
Qty: 90 TABLET | Refills: 1 | Status: SHIPPED | OUTPATIENT
Start: 2019-08-02 | End: 2019-11-02

## 2019-08-02 RX ORDER — METOPROLOL TARTRATE 25 MG/1
25 TABLET, FILM COATED ORAL 2 TIMES DAILY
Qty: 180 TABLET | Refills: 1 | Status: SHIPPED | OUTPATIENT
Start: 2019-08-02 | End: 2020-02-10

## 2019-08-02 RX ORDER — METOPROLOL TARTRATE 25 MG/1
TABLET, FILM COATED ORAL
Qty: 180 TABLET | Refills: 2
Start: 2019-08-02

## 2019-08-02 ASSESSMENT — ENCOUNTER SYMPTOMS
ABDOMINAL PAIN: 0
FEVER: 0
PARESTHESIAS: 0
CHILLS: 0
HEARTBURN: 0
JOINT SWELLING: 0
DIARRHEA: 0
DYSURIA: 0
SORE THROAT: 0
ARTHRALGIAS: 0
DIZZINESS: 0
NERVOUS/ANXIOUS: 0
EYE PAIN: 0
NAUSEA: 0
FREQUENCY: 0
HEADACHES: 0
COUGH: 0
CONSTIPATION: 0
SHORTNESS OF BREATH: 0
HEMATOCHEZIA: 0
MYALGIAS: 0
BREAST MASS: 0
HEMATURIA: 0
PALPITATIONS: 0
WEAKNESS: 0

## 2019-08-02 ASSESSMENT — MIFFLIN-ST. JEOR: SCORE: 1190.93

## 2019-08-02 ASSESSMENT — ACTIVITIES OF DAILY LIVING (ADL): CURRENT_FUNCTION: NO ASSISTANCE NEEDED

## 2019-08-02 ASSESSMENT — PAIN SCALES - GENERAL: PAINLEVEL: NO PAIN (0)

## 2019-08-02 NOTE — RESULT ENCOUNTER NOTE
Dear Raeann,    Your recent test results are attached.      Good cholesterol.    If you have any questions please feel free to contact [phone] or myself via [email or web address].    Sincerely,  Luz Coughlin, CNP

## 2019-08-02 NOTE — RESULT ENCOUNTER NOTE
Dear Raeann,    Your recent test results are attached.      Normal parathyroid hormone.    If you have any questions please feel free to contact [phone] or myself via [employer].    Sincerely,  Luz Coughlin, CNP

## 2019-08-02 NOTE — PROGRESS NOTES
"SUBJECTIVE:   Raeann Abdalla is a 84 year old female who presents for Preventive Visit.      Are you in the first 12 months of your Medicare coverage?  No    Healthy Habits:     In general, how would you rate your overall health?  Good    Frequency of exercise:  6-7 days/week    Duration of exercise:  30-45 minutes    Do you usually eat at least 4 servings of fruit and vegetables a day, include whole grains    & fiber and avoid regularly eating high fat or \"junk\" foods?  No    Taking medications regularly:  Yes    Medication side effects:  Not applicable    Ability to successfully perform activities of daily living:  No assistance needed    Home Safety:  Lack of grab bars in the bathroom    Hearing Impairment:  No hearing concerns    In the past 6 months, have you been bothered by leaking of urine? Yes    In general, how would you rate your overall mental or emotional health?  Good      PHQ-2 Total Score: 0    Additional concerns today:  No    Do you feel safe in your environment? Yes    Do you have a Health Care Directive? No: Advance care planning was reviewed with patient; patient declined at this time.      Fall risk  Fallen 2 or more times in the past year?: No  Any fall with injury in the past year?: No    Cognitive Screening   1) Repeat 3 items (Leader, Season, Table)    2) Clock draw: ABNORMAL lauri 11:50  3) 3 item recall: Recalls NO objects   Results: 0 items recalled: PROBABLE COGNITIVE IMPAIRMENT, **INFORM PROVIDER**    Mini-CogTM Copyright CHERELLE Fowler. Licensed by the author for use in [facility]; reprinted with permission ([email or web address]@.[email or web address]). All rights reserved.      Do you have sleep apnea, excessive snoring or daytime drowsiness?: no    Reviewed and updated as needed this visit by clinical staff  Tobacco         Reviewed and updated as needed this visit by Provider        Social History     Tobacco Use     Smoking status: Never Smoker     Smokeless tobacco: Never Used   Substance Use Topics     " Alcohol use: Yes     Alcohol/week: 0.0 oz     Comment: 12 0z beer daily     If you drink alcohol do you typically have >3 drinks per day or >7 drinks per week? No    No flowsheet data found.      Hyperlipidemia Follow-Up      Are you having any of the following symptoms? (Select all that apply)  No complaints of shortness of breath, chest pain or pressure.  No increased sweating or nausea with activity.  No left-sided neck or arm pain.  No complaints of pain in calves when walking 1-2 blocks.    Are you regularly taking any medication or supplement to lower your cholesterol?   Yes- simvastatin    Are you having muscle aches or other side effects that you think could be caused by your cholesterol lowering medication?  No      Hypertension Follow-up      Do you check your blood pressure regularly outside of the clinic? No     Are you following a low salt diet? No    Are your blood pressures ever more than 140 on the top number (systolic) OR more   than 90 on the bottom number (diastolic), for example 140/90? No  Vascular Disease Follow-up      Are you having any of the following symptoms? (Select all that apply) No complaints of shortness of breath, chest pain or pressure.  No increased sweating or nausea with activity.  No left-sided neck or arm pain.  No complaints of pain in calves when walking 1-2 blocks.    How often do you take nitroglycerin? Never    Do you take an aspirin every day? Yes      Current providers sharing in care for this patient include: Patient Care Team:  Luz Coughlin APRN CNP as PCP - General (Nurse Practitioner - Family)  Josie Askew MD (Internal Medicine)  Luz Coughlin APRN CNP as Assigned PCP  Yani Georges MD as MD (Internal Medicine)    The following health maintenance items are reviewed in Epic and correct as of today:  Health Maintenance   Topic Date Due     ADVANCE CARE PLANNING  1935     ZOSTER IMMUNIZATION (1 of 2) 07/27/1986     MEDICARE  ANNUAL WELLNESS VISIT  12/31/2016     FALL RISK ASSESSMENT  08/17/2019     INFLUENZA VACCINE (1) 09/01/2019     EYE EXAM  09/25/2019     BMP  01/16/2020     DTAP/TDAP/TD IMMUNIZATION (3 - Td) 01/01/2024     DEXA  Completed     PHQ-2  Completed     PNEUMOCOCCAL IMMUNIZATION 65+ LOW/MEDIUM RISK  Completed     IPV IMMUNIZATION  Aged Out     MENINGITIS IMMUNIZATION  Aged Out     Lab work is in process  BP Readings from Last 3 Encounters:   08/02/19 124/86   07/31/19 120/68   07/31/19 120/68    Wt Readings from Last 3 Encounters:   08/02/19 77.5 kg (170 lb 12.8 oz)   07/31/19 77.8 kg (171 lb 9.6 oz)   03/27/19 79 kg (174 lb 3.2 oz)                  Patient Active Problem List   Diagnosis     Hypertension goal BP (blood pressure) < 140/90     S/P AVR (aortic valve replacement)     S/P CABG (coronary artery bypass graft)     Short-term memory loss     S/P bilateral hip replacements     History of right-sided carotid endarterectomy     CKD (chronic kidney disease) stage 3, GFR 30-59 ml/min (H)     Hyperlipidemia LDL goal <70     Coronary artery disease involving autologous artery coronary bypass graft without angina pectoris     Posterior vitreous detachment, bilateral     Pseudophakia, ou     Hx of retinal hemorrhage     Past Surgical History:   Procedure Laterality Date     ABDOMEN SURGERY  Feb. 2014    aortic valve replacement     AORTIC VALVE REPLACEMENT       BYPASS GRAFT ARTERY CORONARY      X 3     CATARACT IOL, RT/LT       COLONOSCOPY  2010 ?    polyps found for first time     endarectomy Right     carotid     ENT SURGERY       HEAD & NECK SURGERY      rt carotid artery neck plaque removal     JOINT REPLACEMENT, HIP RT/LT Bilateral      PHACOEMULSIFICATION WITH STANDARD INTRAOCULAR LENS IMPLANT  07/2017; 8/2017    right eye; left eye     THORACIC SURGERY  2014    aortic valve replacement, triple bypass     TONSILLECTOMY N/A      VASCULAR SURGERY  2013    rt carotid artery neck plaque removal       Social History      Tobacco Use     Smoking status: Never Smoker     Smokeless tobacco: Never Used   Substance Use Topics     Alcohol use: Yes     Alcohol/week: 0.0 oz     Comment: 12 0z beer daily     Family History   Problem Relation Age of Onset     Other Cancer Sister      Macular Degeneration Mother 75     Other Cancer Mother         Ovarian     Coronary Artery Disease Father      Hypertension Father      Hyperlipidemia Father      Diabetes Father      Other Cancer Sister         cervical         Current Outpatient Medications   Medication Sig Dispense Refill     acetaminophen (TYLENOL) 500 MG tablet Take 500-1,000 mg by mouth every 6 hours as needed for mild pain       aspirin 81 MG EC tablet Take 81 mg by mouth 2 times daily       carboxymethylcellulose (REFRESH PLUS) 0.5 % SOLN Place 1 drop into both eyes daily as needed for dry eyes       lisinopril (PRINIVIL/ZESTRIL) 5 MG tablet Take 1 tablet (5 mg) by mouth daily 90 tablet 1     metoprolol tartrate (LOPRESSOR) 25 MG tablet Take 1 tablet (25 mg) by mouth 2 times daily Office visit needed for further refills. 180 tablet 2     Multiple Vitamins-Minerals (MULTI VITAMIN/MINERALS PO) Take 1 tablet by mouth once.       predniSONE (DELTASONE) 20 MG tablet Take 1 tablet (20 mg) by mouth daily for 5 days 5 tablet 0     simvastatin (ZOCOR) 40 MG tablet Take 40 mg by mouth           Review of Systems   Constitutional: Negative for chills and fever.   HENT: Negative for congestion, ear pain, hearing loss and sore throat.    Eyes: Negative for pain and visual disturbance.   Respiratory: Negative for cough and shortness of breath.    Cardiovascular: Negative for chest pain, palpitations and peripheral edema.   Gastrointestinal: Negative for abdominal pain, constipation, diarrhea, heartburn, hematochezia and nausea.   Breasts:  Negative for tenderness, breast mass and discharge.   Genitourinary: Negative for dysuria, frequency, genital sores, hematuria, pelvic pain, urgency, vaginal  "bleeding and vaginal discharge.   Musculoskeletal: Negative for arthralgias, joint swelling and myalgias.   Skin: Negative for rash.   Neurological: Negative for dizziness, weakness, headaches and paresthesias.   Psychiatric/Behavioral: Negative for mood changes. The patient is not nervous/anxious.          OBJECTIVE:   /86   Pulse 72   Temp 98.3  F (36.8  C) (Oral)   Resp 20   Ht 1.588 m (5' 2.5\")   Wt 77.5 kg (170 lb 12.8 oz)   SpO2 96%   BMI 30.74 kg/m   Estimated body mass index is 30.74 kg/m  as calculated from the following:    Height as of this encounter: 1.588 m (5' 2.5\").    Weight as of this encounter: 77.5 kg (170 lb 12.8 oz).  Physical Exam  GENERAL: healthy, alert and no distress  EYES: Eyes grossly normal to inspection, PERRL and conjunctivae and sclerae normal  HENT: ear canals and TM's normal, nose and mouth without ulcers or lesions  NECK: no adenopathy, no asymmetry, masses, or scars and thyroid normal to palpation  RESP: lungs clear to auscultation - no rales, rhonchi or wheezes  CV: regular rate and rhythm, normal S1 S2, no S3 or S4, no murmur, click or rub, no peripheral edema and peripheral pulses strong  ABDOMEN: soft, nontender, no hepatosplenomegaly, no masses and bowel sounds normal  MS: no gross musculoskeletal defects noted, no edema  PSYCH: confused, affect normal/bright and appearance well groomed    Diagnostic Test Results:  In process    ASSESSMENT / PLAN:   1. Encounter for Medicare annual wellness exam      2. CKD (chronic kidney disease) stage 3, GFR 30-59 ml/min (H)  Recheck labs.  - Renal panel  - Hemoglobin  - Vitamin D Deficiency  - Parathyroid Hormone Intact    3. Benign essential hypertension  Stable.  Continue current treatment plan and medications.   - Renal panel  - lisinopril (PRINIVIL/ZESTRIL) 5 MG tablet; Take 1 tablet (5 mg) by mouth daily  Dispense: 90 tablet; Refill: 1  - metoprolol tartrate (LOPRESSOR) 25 MG tablet; Take 1 tablet (25 mg) by mouth 2 " "times daily  Dispense: 180 tablet; Refill: 1    4. Coronary artery disease involving autologous artery coronary bypass graft without angina pectoris  Stable.  Continue current treatment plan and medications.     5. Hyperlipidemia LDL goal <70  Stable.  Continue current treatment plan and medications.   - Lipid panel reflex to direct LDL Fasting    6. Advanced care planning/counseling discussion  Patient declines.      End of Life Planning:  Patient currently has an advanced directive: No.  I have verified the patient's ablity to prepare an advanced directive/make health care decisions.  Literature was provided to assist patient in preparing an advanced directive.    COUNSELING:  Reviewed preventive health counseling, as reflected in patient instructions       Regular exercise       Healthy diet/nutrition    Estimated body mass index is 30.74 kg/m  as calculated from the following:    Height as of this encounter: 1.588 m (5' 2.5\").    Weight as of this encounter: 77.5 kg (170 lb 12.8 oz).    Weight management plan: Discussed healthy diet and exercise guidelines     reports that she has never smoked. She has never used smokeless tobacco.      Appropriate preventive services were discussed with this patient, including applicable screening as appropriate for cardiovascular disease, diabetes, osteopenia/osteoporosis, and glaucoma.  As appropriate for age/gender, discussed screening for colorectal cancer, prostate cancer, breast cancer, and cervical cancer. Checklist reviewing preventive services available has been given to the patient.    Reviewed patients plan of care and provided an AVS. The Basic Care Plan (routine screening as documented in Health Maintenance) for Raeann meets the Care Plan requirement. This Care Plan has been established and reviewed with the Patient.    Counseling Resources:  ATP IV Guidelines  Pooled Cohorts Equation Calculator  Breast Cancer Risk Calculator  FRAX Risk Assessment  ICSI Preventive " Guidelines  Dietary Guidelines for Americans, 2010  USDA's MyPlate  ASA Prophylaxis  Lung CA Screening    STAR Courtney CNP  [facility]    Identified Health Risks:

## 2019-08-02 NOTE — PATIENT INSTRUCTIONS
Consider getting the Shingrix vaccine to prevent shingles.  Please check with your insurance to see if this is best covered at the pharmacy or at a clinic visit.  You can walk into any pharmacy and get the shot without a prescription.  You may also schedule a nurse only visit to have the shot given at the clinic.    Patient Education   Personalized Prevention Plan  You are due for the preventive services outlined below.  Your care team is available to assist you in scheduling these services.  If you have already completed any of these items, please share that information with your care team to update in your medical record.  Health Maintenance Due   Topic Date Due     Discuss Advance Care Planning  1935     Zoster (Shingles) Vaccine (1 of 2) 07/27/1986     Annual Wellness Visit  12/31/2016     FALL RISK ASSESSMENT  08/17/2019       Understanding [employer] MyPlate  The USDA (U.S. Department of Agriculture) has guidelines to help you make healthy food choices. These are called MyPlate. MyPlate shows the food groups that make up healthy meals using the image of a place setting. Before you eat, think about the healthiest choices for what to put onto your plate or into your cup or bowl. To learn more about building a healthy plate, visit www.choosemyplate.gov.    The food groups    Fruits. Any fruit or 100% fruit juice counts as part of the Fruit Group. Fruits may be fresh, canned, frozen, or dried, and may be whole, cut-up, or pureed. Make half your plate fruits and vegetables.    Vegetables. Any vegetable or 100% vegetable juice counts as a member of the Vegetable Group. Vegetables may be fresh, frozen, canned, or dried. They can be served raw or cooked and may be whole, cut-up, or mashed. Make half your plate fruits and vegetables.    Grains. All foods made from grains are part of the Grains Group. These include wheat, rice, oats, cornmeal, and barley such as bread, pasta, oatmeal, cereal, tortillas, and grits.  Grains should be no more than a quarter of your plate. At least half of your grains should be whole grains.    Protein. This group includes meat, poultry, seafood, beans and peas, eggs, processed soy products (like tofu), nuts (including nut butters), and seeds. Make protein choices no more than a quarter of your plate. Meat and poultry choices should be lean or low fat.    Dairy. All fluid milk products and foods made from milk that contain calcium, like yogurt and cheese, are part of the Dairy Group. (Foods that have little calcium, such as cream, butter, and cream cheese, are not part of the group.) Most dairy choices should be low-fat or fat-free.    Oils. These are fats that are liquid at room temperature. They include canola, corn, olive, soybean, and sunflower oil. Foods that are mainly oil include mayonnaise, certain salad dressings, and soft margarines. You should have only 5 to 7 teaspoons of oils a day. You probably already get this much from the food you eat.  Date Last Reviewed: 8/1/2017 2000-2018 [employer]. [address] [place] [address]. All rights reserved. This information is not intended as a substitute for professional medical care. Always follow your healthcare professional's instructions.          Urinary Incontinence, Female (Adult)  Urinary incontinence means loss of control of the bladder. This problem affects many women, especially as they get older. If you have incontinence, you may be embarrassed to ask for help. But know that this problem can be treated.  Types of Incontinence  There are different types of incontinence. Two of the main types are described here. You can have more than one type.    Stress incontinence. With this type, urine leaks when pressure (stress) is put on the bladder. This may happen when you cough, sneeze, or laugh. Stress incontinence most often occurs because the pelvic floor muscles that support the bladder and urethra are weak. This  can happen after pregnancy and vaginal childbirth or a hysterectomy. It can also be due to excess body weight or hormone changes.    Urge incontinence (also called overactive bladder). With this type, a sudden urge to urinate is felt often. This may happen even though there may not be much urine in the bladder. The need to urinate often during the night is common. Urge incontinence most often occurs because of bladder spasms. This may be due to bladder irritation or infection. Damage to bladder nerves or pelvic muscles, constipation, and certain medicines can also lead to urge incontinence.  Treatment of urinary incontinence depends on the cause. Further evaluation is needed to find the type you have. This will likely include an exam and certain tests. Based on the results, you and your healthcare provider can then plan treatment. Until a diagnosis is made, the home care tips below can help relieve symptoms.  Home care    Do pelvic floor muscle exercises, if they are prescribed. The pelvic floor muscles help support the bladder and urethra. Many women find that their symptoms improve when doing special exercises that strengthen these muscles. To do the exercises contract the muscles you would use to stop your stream of urine, but do this when you re not urinating. Hold for 10 seconds, then relax. Repeat 10 to 20 times in a row, at least 3 times a day. Your provider may give you other instructions for how to do the exercises and how often.    Keep a bladder diary. This helps track how often and how much you urinate over a set period of time. Bring this diary with you to your next visit with the provider. The information can help your provider learn more about your bladder problem.    Lose weight, if advised to by your provider. Excess weight puts pressure on the bladder. Your provider can help you create a weight-loss plan that s right for you. This may include exercising more and making certain diet  changes.    Don't consume foods and drinks that may irritate the bladder. These can include alcohol and caffeinated drinks.    Quit smoking. Smoking and other tobacco use can lead to chronic cough that strains the pelvic floor muscles. Smoking may also damage the bladder and urethra. Talk with your provider about treatments or methods you can use to quit smoking.    If drinking large amounts of fluid causes you to have symptoms, you may be advised to limit your fluid intake. You may also be advised to drink most of your fluids during the day and to limit fluids at night.    If you re worried about urine leakage or accidents, you may wear absorbent pads to catch urine. Change the pads often. This helps reduce discomfort. It may also reduce the risk of skin or bladder infections.  Follow-up care  Follow up with your healthcare provider, or as directed. It may take some to find the right treatment for your problem. Your treatment plan may include special therapies or medicines. Certain procedures or surgery may also be options. Be sure to discuss any questions you have with your provider.  When to seek medical advice  Call the healthcare provider right away if any of these occur:    Fever of 100.4 F (38 C) or higher, or as directed by your provider    Bladder pain or fullness    Abdominal swelling    Nausea or vomiting    Back pain    Weakness, dizziness or fainting  Date Last Reviewed: 10/1/2017    5826-2826 [employer]. [address] [place] [address], [place] [address]. All rights reserved. This information is not intended as a substitute for professional medical care. Always follow your healthcare professional's instructions.

## 2019-08-02 NOTE — RESULT ENCOUNTER NOTE
Dear Raeann,    Your recent test results are attached.      No anemia.    If you have any questions please feel free to contact [phone] or myself via [email or web address].    Sincerely,  Luz Coughlin, CNP

## 2019-08-02 NOTE — RESULT ENCOUNTER NOTE
Dear Raeann,    Your recent test results are attached.      Normal Vitamin D.    If you have any questions please feel free to contact [phone] or myself via [employer].    Sincerely,  Luz Coughlin, CNP

## 2019-08-02 NOTE — PROGRESS NOTES
The patient was counseled and encouraged to consider modifying their diet and eating habits. She was provided with information on recommended healthy diet options.  Information on urinary incontinence and treatment options given to patient.

## 2019-09-23 ENCOUNTER — TELEPHONE (OUTPATIENT)
Dept: INTERNAL MEDICINE | Facility: CLINIC | Age: 84
End: 2019-09-23

## 2019-09-23 DIAGNOSIS — Z95.2 H/O AORTIC VALVE REPLACEMENT: Primary | ICD-10-CM

## 2019-09-23 RX ORDER — AMOXICILLIN 500 MG/1
CAPSULE ORAL
Qty: 4 CAPSULE | Refills: 1 | Status: SHIPPED | OUTPATIENT
Start: 2019-09-23

## 2019-09-23 NOTE — TELEPHONE ENCOUNTER
Okay for amoxicillin 500 mg tablets, take 4 tablets 1 hour prior to dental appointment, #4, 1 RF, indication aortic valve replacement.    Thanks,  Luz Coughlin, CNP

## 2019-09-23 NOTE — TELEPHONE ENCOUNTER
"Please see message below.    Per patient's  \"Raeann and I are required by our Dentist to take 4 500 mg capsules of Amoxicillin 1 hr. before the procedure because of my and her bi-lateral hip replacements and her Aortic Valve replacement. The Dentist sold his practice and the new Dentist has directed us to get the prescriptions from our Medical Provider.  If you are willing to do this, fine. Frankly, it seems to me that it is his responsibility, you shouldn't be involved and I will be happy to direct him to write his own prescriptions, or I'll get a new dentist!    Bianka Esposito RN  "

## 2019-09-27 ENCOUNTER — OFFICE VISIT (OUTPATIENT)
Dept: OPHTHALMOLOGY | Facility: CLINIC | Age: 84
End: 2019-09-27
Payer: MEDICARE

## 2019-09-27 DIAGNOSIS — H52.4 PRESBYOPIA: ICD-10-CM

## 2019-09-27 DIAGNOSIS — H35.60 RETINAL HEMORRHAGE, UNSPECIFIED LATERALITY: ICD-10-CM

## 2019-09-27 DIAGNOSIS — Z96.1 PSEUDOPHAKIA: Primary | ICD-10-CM

## 2019-09-27 DIAGNOSIS — H43.813 POSTERIOR VITREOUS DETACHMENT, BILATERAL: ICD-10-CM

## 2019-09-27 PROCEDURE — 92014 COMPRE OPH EXAM EST PT 1/>: CPT | Performed by: OPHTHALMOLOGY

## 2019-09-27 PROCEDURE — 92015 DETERMINE REFRACTIVE STATE: CPT | Mod: GY | Performed by: OPHTHALMOLOGY

## 2019-09-27 ASSESSMENT — SLIT LAMP EXAM - LIDS
COMMENTS: 2+ DERMATOCHALASIS - UPPER LID
COMMENTS: 2+ DERMATOCHALASIS - UPPER LID

## 2019-09-27 ASSESSMENT — REFRACTION_MANIFEST
OS_ADD: +3.00
OS_SPHERE: -1.00
OD_SPHERE: -1.00
OD_ADD: +3.00
OD_AXIS: 132
OS_AXIS: 035
OD_CYLINDER: +1.25
OS_CYLINDER: +1.00

## 2019-09-27 ASSESSMENT — TONOMETRY
OD_IOP_MMHG: 13
OS_IOP_MMHG: 14
IOP_METHOD: APPLANATION

## 2019-09-27 ASSESSMENT — CUP TO DISC RATIO
OD_RATIO: 0.2
OS_RATIO: 0.2

## 2019-09-27 ASSESSMENT — REFRACTION_WEARINGRX
SPECS_TYPE: PAL
OS_AXIS: 035
OS_SPHERE: -1.00
OS_ADD: +3.00
OD_SPHERE: -1.00
OD_AXIS: 130
OD_CYLINDER: +1.25
OS_CYLINDER: +1.00
OD_ADD: +3.00

## 2019-09-27 ASSESSMENT — CONF VISUAL FIELD
OD_NORMAL: 1
OS_NORMAL: 1

## 2019-09-27 ASSESSMENT — EXTERNAL EXAM - RIGHT EYE: OD_EXAM: MILD BROW, PROLAPSED FAT PADS: UPPER, LOWER

## 2019-09-27 ASSESSMENT — VISUAL ACUITY
OS_CC: 20/20
METHOD: SNELLEN - LINEAR
OD_CC: 20/20
OD_CC: J1
OS_CC: J1
CORRECTION_TYPE: GLASSES

## 2019-09-27 ASSESSMENT — EXTERNAL EXAM - LEFT EYE: OS_EXAM: MILD BROW, PROLAPSED FAT PADS: UPPER, LOWER

## 2019-09-27 NOTE — LETTER
9/27/2019         RE: Raeann Abdalla  [address] [place] [address]  [place] [address]        Dear Colleague,    Thank you for referring your patient, Raeann Abdalla, to the [facility]. Please see a copy of my visit note below.     Current Eye Medications:  no     Subjective: Comprehensive eye exam.   Pt reports she is seeing well and her eyes seem otherwise fine to her.     Objective:  See Ophthalmology Exam.       Assessment:  Stable eye exam.      ICD-10-CM    1. Pseudophakia, ou Z96.1 REFRACTIVE STATUS   2. Posterior vitreous detachment, bilateral H43.813    3. Hx of retinal hemorrhage H35.60    4. Presbyopia H52.4 EYE EXAM (SIMPLE-NONBILLABLE)        Plan:  Glasses Rx given - optional  Use artificial tears up to 4 times daily both eyes.  (Refresh Tears, Systane Ultra/Balance, or Theratears)  Possible clouding of posterior capsule both eyes discussed.  Call in May 2020 for an appointment in September 2020 for Complete Exam.    Dr. Rosario [phone]           Again, thank you for allowing me to participate in the care of your patient.        Sincerely,        Ifeanyi Rosario MD

## 2019-09-27 NOTE — PROGRESS NOTES
Current Eye Medications:  no     Subjective: Comprehensive eye exam.   Pt reports she is seeing well and her eyes seem otherwise fine to her.     Objective:  See Ophthalmology Exam.       Assessment:  Stable eye exam.      ICD-10-CM    1. Pseudophakia, ou Z96.1 REFRACTIVE STATUS   2. Posterior vitreous detachment, bilateral H43.813    3. Hx of retinal hemorrhage H35.60    4. Presbyopia H52.4 EYE EXAM (SIMPLE-NONBILLABLE)        Plan:  Glasses Rx given - optional  Use artificial tears up to 4 times daily both eyes.  (Refresh Tears, Systane Ultra/Balance, or Theratears)  Possible clouding of posterior capsule both eyes discussed.  Call in May 2020 for an appointment in September 2020 for Complete Exam.    Dr. Rosario [phone]

## 2019-09-27 NOTE — PATIENT INSTRUCTIONS
Glasses Rx given - optional  Use artificial tears up to 4 times daily both eyes.  (Refresh Tears, Systane Ultra/Balance, or Theratears)  Possible clouding of posterior capsule both eyes discussed.  Call in May 2020 for an appointment in September 2020 for Complete Exam.    Dr. Rosario [phone]

## 2019-11-02 ENCOUNTER — MYC REFILL (OUTPATIENT)
Dept: FAMILY MEDICINE | Facility: CLINIC | Age: 84
End: 2019-11-02

## 2019-11-02 DIAGNOSIS — I10 BENIGN ESSENTIAL HYPERTENSION: ICD-10-CM

## 2019-11-05 RX ORDER — LISINOPRIL 5 MG/1
5 TABLET ORAL DAILY
Qty: 90 TABLET | Refills: 1 | Status: SHIPPED | OUTPATIENT
Start: 2019-11-05 | End: 2020-06-25

## 2020-02-08 ENCOUNTER — MYC REFILL (OUTPATIENT)
Dept: FAMILY MEDICINE | Facility: CLINIC | Age: 85
End: 2020-02-08

## 2020-02-08 DIAGNOSIS — I10 BENIGN ESSENTIAL HYPERTENSION: ICD-10-CM

## 2020-02-08 RX ORDER — LISINOPRIL 5 MG/1
5 TABLET ORAL DAILY
Qty: 90 TABLET | Refills: 1 | Status: CANCELLED | OUTPATIENT
Start: 2020-02-08

## 2020-02-10 RX ORDER — METOPROLOL TARTRATE 25 MG/1
TABLET, FILM COATED ORAL
Qty: 180 TABLET | Refills: 1 | Status: SHIPPED | OUTPATIENT
Start: 2020-02-10 | End: 2020-08-03

## 2020-02-10 NOTE — TELEPHONE ENCOUNTER
lisinopril (PRINIVIL/ZESTRIL) 5 MG tablet 90 tablet 1 11/5/2019  No   Sig - Route: Take 1 tablet (5 mg) by mouth daily - Oral   Sent to pharmacy as: lisinopril (PRINIVIL/ZESTRIL) 5 MG tablet   Class: E-Prescribe   Order: [id number]   E-Prescribing Status: Receipt confirmed by pharmacy (11/5/2019  9:31 AM CST)     Duplicate request.   Closing encounter.     Bianka Esposito RN

## 2020-02-10 NOTE — TELEPHONE ENCOUNTER
Disp Refills Start End ESME   lisinopril (PRINIVIL/ZESTRIL) 5 MG tablet 90 tablet 1 11/5/2019  No   Sig - Route: Take 1 tablet (5 mg) by mouth daily - Oral   Sent to pharmacy as: lisinopril (PRINIVIL/ZESTRIL) 5 MG tablet   Class: E-Prescribe   Order: [id number]   E-Prescribing Status: Receipt confirmed by pharmacy (11/5/2019  9:31 AM CST)     Joanne Hunter MA on 2/10/2020 at 8:31 AM

## 2020-02-10 NOTE — TELEPHONE ENCOUNTER
Disp Refills Start End ESME   metoprolol tartrate (LOPRESSOR) 25 MG tablet 180 tablet 1 8/2/2019  No   Sig - Route: Take 1 tablet (25 mg) by mouth 2 times daily - Oral   Sent to pharmacy as: metoprolol tartrate (LOPRESSOR) 25 MG tablet   Class: E-Prescribe   Notes to Pharmacy: Please do not fill until patient requests.   Order: [id number]   E-Prescribing Status: Receipt confirmed by pharmacy (8/2/2019  8:27 AM CDT)     Joanne Hunter MA on 2/10/2020 at 8:31 AM

## 2020-02-17 ASSESSMENT — ENCOUNTER SYMPTOMS
PALPITATIONS: 0
CONSTIPATION: 0
HEMATOCHEZIA: 0
HEADACHES: 0
DIARRHEA: 0
PARESTHESIAS: 0
WEAKNESS: 0
FREQUENCY: 1
DIZZINESS: 0
HEARTBURN: 0
NERVOUS/ANXIOUS: 0
SHORTNESS OF BREATH: 0
CHILLS: 0
COUGH: 0
DYSURIA: 0
SORE THROAT: 0
EYE PAIN: 0
ABDOMINAL PAIN: 0
ARTHRALGIAS: 0
FEVER: 0
MYALGIAS: 0
HEMATURIA: 0
JOINT SWELLING: 0
BREAST MASS: 0
NAUSEA: 0

## 2020-02-17 ASSESSMENT — ACTIVITIES OF DAILY LIVING (ADL)
CURRENT_FUNCTION: MEDICATION ADMINISTRATION REQUIRES ASSISTANCE
CURRENT_FUNCTION: MONEY MANAGEMENT REQUIRES ASSISTANCE

## 2020-02-20 ENCOUNTER — OFFICE VISIT (OUTPATIENT)
Dept: INTERNAL MEDICINE | Facility: CLINIC | Age: 85
End: 2020-02-20
Payer: MEDICARE

## 2020-02-20 VITALS
SYSTOLIC BLOOD PRESSURE: 142 MMHG | HEIGHT: 63 IN | DIASTOLIC BLOOD PRESSURE: 68 MMHG | HEART RATE: 58 BPM | RESPIRATION RATE: 18 BRPM | BODY MASS INDEX: 29.41 KG/M2 | WEIGHT: 166 LBS | TEMPERATURE: 97.6 F | OXYGEN SATURATION: 98 %

## 2020-02-20 DIAGNOSIS — Z00.00 ROUTINE HISTORY AND PHYSICAL EXAMINATION OF ADULT: Primary | ICD-10-CM

## 2020-02-20 DIAGNOSIS — I10 HYPERTENSION GOAL BP (BLOOD PRESSURE) < 140/90: ICD-10-CM

## 2020-02-20 DIAGNOSIS — E78.5 HYPERLIPIDEMIA LDL GOAL <70: ICD-10-CM

## 2020-02-20 DIAGNOSIS — I25.810 CORONARY ARTERY DISEASE INVOLVING AUTOLOGOUS ARTERY CORONARY BYPASS GRAFT WITHOUT ANGINA PECTORIS: ICD-10-CM

## 2020-02-20 DIAGNOSIS — Z78.0 ASYMPTOMATIC MENOPAUSAL STATE: ICD-10-CM

## 2020-02-20 DIAGNOSIS — N18.30 CKD (CHRONIC KIDNEY DISEASE) STAGE 3, GFR 30-59 ML/MIN (H): ICD-10-CM

## 2020-02-20 DIAGNOSIS — Z95.1 S/P CABG (CORONARY ARTERY BYPASS GRAFT): ICD-10-CM

## 2020-02-20 LAB
ANION GAP SERPL CALCULATED.3IONS-SCNC: 6 MMOL/L (ref 3–14)
BUN SERPL-MCNC: 15 MG/DL (ref 7–30)
CALCIUM SERPL-MCNC: 9.7 MG/DL (ref 8.5–10.1)
CHLORIDE SERPL-SCNC: 102 MMOL/L (ref 94–109)
CO2 SERPL-SCNC: 28 MMOL/L (ref 20–32)
CREAT SERPL-MCNC: 0.99 MG/DL (ref 0.52–1.04)
GFR SERPL CREATININE-BSD FRML MDRD: 53 ML/MIN/{1.73_M2}
GLUCOSE SERPL-MCNC: 98 MG/DL (ref 70–99)
POTASSIUM SERPL-SCNC: 5.3 MMOL/L (ref 3.4–5.3)
SODIUM SERPL-SCNC: 136 MMOL/L (ref 133–144)

## 2020-02-20 PROCEDURE — 99213 OFFICE O/P EST LOW 20 MIN: CPT | Mod: 25 | Performed by: INTERNAL MEDICINE

## 2020-02-20 PROCEDURE — G0439 PPPS, SUBSEQ VISIT: HCPCS | Performed by: INTERNAL MEDICINE

## 2020-02-20 PROCEDURE — 36415 COLL VENOUS BLD VENIPUNCTURE: CPT | Performed by: INTERNAL MEDICINE

## 2020-02-20 PROCEDURE — 80048 BASIC METABOLIC PNL TOTAL CA: CPT | Performed by: INTERNAL MEDICINE

## 2020-02-20 RX ORDER — LOSARTAN POTASSIUM 50 MG/1
50 TABLET ORAL DAILY
Qty: 90 TABLET | Refills: 0 | Status: SHIPPED | OUTPATIENT
Start: 2020-02-20 | End: 2020-06-30

## 2020-02-20 ASSESSMENT — ENCOUNTER SYMPTOMS
MYALGIAS: 0
HEMATURIA: 0
DIZZINESS: 0
WEAKNESS: 0
DYSURIA: 0
CONSTIPATION: 0
HEARTBURN: 0
PARESTHESIAS: 0
EYE PAIN: 0
SORE THROAT: 0
ARTHRALGIAS: 0
FEVER: 0
SHORTNESS OF BREATH: 0
BREAST MASS: 0
ABDOMINAL PAIN: 0
PALPITATIONS: 0
NAUSEA: 0
FREQUENCY: 1
NERVOUS/ANXIOUS: 0
CHILLS: 0
HEMATOCHEZIA: 0
JOINT SWELLING: 0
HEADACHES: 0
DIARRHEA: 0
COUGH: 0

## 2020-02-20 ASSESSMENT — MIFFLIN-ST. JEOR: SCORE: 1169.16

## 2020-02-20 ASSESSMENT — ACTIVITIES OF DAILY LIVING (ADL)
CURRENT_FUNCTION: MONEY MANAGEMENT REQUIRES ASSISTANCE
CURRENT_FUNCTION: MEDICATION ADMINISTRATION REQUIRES ASSISTANCE

## 2020-02-20 NOTE — LETTER
[address] [place] [address]    February 21, 2020    Raeann Abdalla  [address]  [place] [address]          Dear Dana Cary is a copy of your results.   This basic metabolic panel shows stable renal function. All other numbers are great.   Results for orders placed or performed in visit on 02/20/20   Basic metabolic panel  (Ca, Cl, CO2, Creat, Gluc, K, Na, BUN)     Status: Abnormal   Result Value Ref Range    Sodium 136 133 - 144 mmol/L    Potassium 5.3 3.4 - 5.3 mmol/L    Chloride 102 94 - 109 mmol/L    Carbon Dioxide 28 20 - 32 mmol/L    Anion Gap 6 3 - 14 mmol/L    Glucose 98 70 - 99 mg/dL    Urea Nitrogen 15 7 - 30 mg/dL    Creatinine 0.99 0.52 - 1.04 mg/dL    GFR Estimate 53 (L) >60 mL/min/[1.73_m2]    GFR Estimate If Black 61 >60 mL/min/[1.73_m2]    Calcium 9.7 8.5 - 10.1 mg/dL       If you have any questions or concerns, please me or my clinic team at [phone].      Sincerely,        Max Haney MD/vijay

## 2020-02-20 NOTE — PATIENT INSTRUCTIONS
Stop lisinopril 5mg and start losatan 50mg once a day. Follow-up in 4 weeks for visit and lab to ensure BP well controlled on new medication.     Schedule Bone density scan - DEXA. Dr. Haney to follow-up with results once completed.

## 2020-02-20 NOTE — PROGRESS NOTES
"SUBJECTIVE:   Raeann Abdalla is a 84 year old female who presents for Preventive Visit. This is a get acquainted visit with a new patient to the Internal Medicine department , I am now seeing her  Antoni also for primary care.    Are you in the first 12 months of your Medicare coverage?  No    Healthy Habits:     In general, how would you rate your overall health?  Good    Frequency of exercise:  None    Do you usually eat at least 4 servings of fruit and vegetables a day, include whole grains    & fiber and avoid regularly eating high fat or \"junk\" foods?  No    Taking medications regularly:  Yes    Medication side effects:  Not applicable, None, No muscle aches and No significant flushing    Ability to successfully perform activities of daily living:  Medication administration requires assistance and money management requires assistance    Home Safety:  Lack of grab bars in the bathroom and lighting is poor    Hearing Impairment:  No hearing concerns    In the past 6 months, have you been bothered by leaking of urine? Yes    In general, how would you rate your overall mental or emotional health?  Fair      PHQ-2 Total Score: 2    Additional concerns today:  No    Patient has long-standing hypertension. Patient is currently on metoprolol 25mg and lisinopril 5mg QDAY. Patient does not check their blood pressure regularly and is 120-30 systolic (not sure about diastolic) on average. It never above 140/90. They do not following a low salt die and eats lots of fast foodt. Patient denies abnormal symptoms like dizziness, headaches, nausea, or palpations.     Patient has long-standing hyperlipidemia. Patient is currently on statin therapy. Patient denies any adverse medication side effects like muscle aches. Last lipid panel was on 8/2/2019 and is WNL.     Short-term memory is worsening over last year but very gradual per . Patient is able to function well and completed tasks. No fall risk. Patient and "  are not concerned.  present to supplement answers today at clinic.     Saw cardiology in September 2019. Hx of open aortic valve replacement. Patient denies chest pain, dyspnea, nausea, chest tightness, or shootingneck/arm pain. Everything stable per cardiology and recommended patient come in to meet new cardiologist since their's retired.     Do you feel safe in your environment? Yes    Have you ever done Advance Care Planning? (For example, a Health Directive, POLST, or a discussion with a medical provider or your loved ones about your wishes): Yes, patient states has an Advance Care Planning document and will bring a copy to the clinic.    Fall risk  Fallen 2 or more times in the past year?: No  Any fall with injury in the past year?: No    Cognitive Screening   1) Repeat 3 items (Leader, Season, Table)    2) Clock draw: ABNORMAL   3) 3 item recall: Recalls NO objects   Results: 0 items recalled: PROBABLE COGNITIVE IMPAIRMENT, **INFORM PROVIDER**    Mini-CogTM Copyright CHERELLE Fowler. Licensed by the author for use in [facility]; reprinted with permission (terrell@[facility]). All rights reserved.      Do you have sleep apnea, excessive snoring or daytime drowsiness?: no    Reviewed and updated as needed this visit by clinical staff         Reviewed and updated as needed this visit by Provider        Social History     Tobacco Use     Smoking status: Never Smoker     Smokeless tobacco: Never Used   Substance Use Topics     Alcohol use: Yes     Alcohol/week: 0.0 standard drinks     Comment: 12 0z beer daily     If you drink alcohol do you typically have >3 drinks per day or >7 drinks per week? No    Alcohol Use 2/17/2020   Prescreen: >3 drinks/day or >7 drinks/week? No   Prescreen: >3 drinks/day or >7 drinks/week? -     Current providers sharing in care for this patient include:   Patient Care Team:  Luz Coughlin, STAR CNP as PCP - General (Nurse Practitioner - Family)  Self, Referred,  "MD (Internal Medicine)  Luz Coughlin, STAR CNP as Assigned PCP  Yani Georges MD as MD (Internal Medicine)    The following health maintenance items are reviewed in Epic and correct as of today:  Health Maintenance   Topic Date Due     ZOSTER IMMUNIZATION (1 of 2) 07/27/1986     INFLUENZA VACCINE (1) 09/01/2019     MEDICARE ANNUAL WELLNESS VISIT  08/02/2020     BMP  08/02/2020     FALL RISK ASSESSMENT  08/02/2020     EYE EXAM  09/27/2020     DTAP/TDAP/TD IMMUNIZATION (3 - Td) 01/01/2024     ADVANCE CARE PLANNING  08/02/2024     DEXA  Completed     PHQ-2  Completed     PNEUMOCOCCAL IMMUNIZATION 65+ LOW/MEDIUM RISK  Completed     IPV IMMUNIZATION  Aged Out     MENINGITIS IMMUNIZATION  Aged Out     Preventative Health:   Dexa - 2012; do not have records  Shingles - Patient not interested.    Review of Systems   Constitutional: Negative for chills and fever.   HENT: Negative for congestion, ear pain, hearing loss and sore throat.    Eyes: Negative for pain and visual disturbance.   Respiratory: Negative for cough and shortness of breath.    Cardiovascular: Negative for chest pain, palpitations and peripheral edema.   Gastrointestinal: Negative for abdominal pain, constipation, diarrhea, heartburn, hematochezia and nausea.   Breasts:  Negative for tenderness, breast mass and discharge.   Genitourinary: Positive for frequency and urgency. Negative for dysuria, genital sores, hematuria, pelvic pain, vaginal bleeding and vaginal discharge.   Musculoskeletal: Negative for arthralgias, joint swelling and myalgias.   Skin: Negative for rash.   Neurological: Negative for dizziness, weakness, headaches and paresthesias.   Psychiatric/Behavioral: Negative for mood changes. The patient is not nervous/anxious.      OBJECTIVE:   BP (!) 142/68   Pulse 58   Temp 97.6  F (36.4  C) (Oral)   Resp 18   Ht 1.588 m (5' 2.5\")   Wt 75.3 kg (166 lb)   SpO2 98%   BMI 29.88 kg/m   Estimated body mass index is 29.88 " "kg/m  as calculated from the following:    Height as of this encounter: 1.588 m (5' 2.5\").    Weight as of this encounter: 75.3 kg (166 lb).   Physical Exam  GENERAL APPEARANCE: healthy, alert and no distress, appears her stated age, normal grooming and affect, pleasant individual  EYES: Eyes grossly normal to inspection, PERRL and conjunctivae and sclerae normal  HENT: ear canals and TM's normal, nose and mouth without ulcers or lesions, oropharynx clear and oral mucous membranes moist  NECK: no adenopathy, no asymmetry, masses, or scars and thyroid normal to palpation  RESP: lungs clear to auscultation - no rales, rhonchi or wheezes  CV: regular rate and rhythm, normal S1 S2, no S3 or S4, no murmur, click or rub, no peripheral edema and peripheral pulses strong  ABDOMEN: soft, nontender, no hepatosplenomegaly, no masses and bowel sounds normal. No bruits.   MS: no musculoskeletal defects are noted and gait is age appropriate without ataxia  SKIN: no suspicious lesions or rashes  NEURO: CN 2-12 intact. Normal strength and tone, sensory exam grossly normal, mentation intact and speech normal  PSYCH: mentation appears normal and affect normal/bright    Diagnostic Test Results:  Labs reviewed in Epic form last visit in August.     Below orders placed at visit:   Orders Placed This Encounter   Procedures     DX Hip/Pelvis/Spine     Basic metabolic panel  (Ca, Cl, CO2, Creat, Gluc, K, Na, BUN)         ASSESSMENT / PLAN:   (Z00.00) Routine history and physical examination of adult  (primary encounter diagnosis)  (Z78.0) Asymptomatic menopausal state  Comment: Patient to schedule dexa scan to ensure adequate bone health. Denied shingles vaccine. No other preventative screenings needed at this time. Exercise (elevating heart rate over 100 bpm for 30 minutes/day) and healthy diet discussed at visit. Encouraged patient to limit fast food in diet.   Plan: Continue annual preventative exams.     Follow-up in 1 year for annual " visit and labs.       (I10) Hypertension goal BP (blood pressure) < 140/90  Comment: Patient reports cough that started with lisinopril. BP not controlled today at 142/68  Plan:   - Stop lisinopril 5mg  - Start losartan 50mg QDAY  - Check BMP today     Follow-up in 4 weeks for BP check and lab.       (E78.5) Hyperlipidemia LDL goal <70  Comment: Stable on current medication regime with no adverse side effects noted by patient.  Plan:   - Continue statin therapy  - Will continue to monitor.       (N18.3) CKD (chronic kidney disease) stage 3, GFR 30-59 ml/min (H)  Comment: Kidney function stable historically, GFR > 45.   Plan:   - Check BMP today  - Continue to monitor at future appointments.       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 On aspirin 81mg QDAY.   Plan:   - Continue aspirin 81mg QDAY  - Follow-up with cardiology as specified.   - Will continue to monitor      Return in about 4 weeks (around 3/19/2020) for Lab Work, BP Recheck.    Patient Instructions:   Stop lisinopril 5mg and start losatan 50mg once a day. Follow-up in 4 weeks for visit and lab to ensure BP well controlled on new medication.   Schedule Bone density scan - DEXA. Dr. Haney to follow-up with results once completed.       STEWART ChristiansonS    This patient office visit today was staffed with me, I did review the entire clinical presentation and history, exam and medical decision making with physicians assistant student RALEIGH Christianson. I agree with and have approved the office visit entirely. Patient seen with me and RALEIGH Christianson  together today. I agree with assessment and plans.     Max Haney MD  [facility]

## 2020-02-21 ENCOUNTER — ANCILLARY PROCEDURE (OUTPATIENT)
Dept: BONE DENSITY | Facility: CLINIC | Age: 85
End: 2020-02-21
Attending: INTERNAL MEDICINE
Payer: MEDICARE

## 2020-02-21 DIAGNOSIS — Z78.0 ASYMPTOMATIC POSTMENOPAUSAL STATUS: ICD-10-CM

## 2020-02-21 DIAGNOSIS — Z78.0 ASYMPTOMATIC MENOPAUSAL STATE: ICD-10-CM

## 2020-02-21 PROCEDURE — 77081 DXA BONE DENSITY APPENDICULR: CPT | Performed by: INTERNAL MEDICINE

## 2020-02-21 PROCEDURE — 77080 DXA BONE DENSITY AXIAL: CPT | Mod: XU | Performed by: INTERNAL MEDICINE

## 2020-02-23 ENCOUNTER — HEALTH MAINTENANCE LETTER (OUTPATIENT)
Age: 85
End: 2020-02-23

## 2020-02-26 ENCOUNTER — TELEPHONE (OUTPATIENT)
Dept: FAMILY MEDICINE | Facility: CLINIC | Age: 85
End: 2020-02-26

## 2020-02-26 DIAGNOSIS — M81.0 AGE-RELATED OSTEOPOROSIS WITHOUT CURRENT PATHOLOGICAL FRACTURE: Primary | ICD-10-CM

## 2020-02-26 RX ORDER — ALENDRONATE SODIUM 70 MG/1
70 TABLET ORAL
Qty: 13 TABLET | Refills: 3 | Status: SHIPPED | OUTPATIENT
Start: 2020-02-26 | End: 2020-06-24

## 2020-02-26 NOTE — TELEPHONE ENCOUNTER
Per her dexascan results note by Dr. Haney prescription for alendronate was to be sent to her pharmacy.  Please send to [facility] [place].  The dosing instructions were not evident on the result note; MD needing to prescribe.  Patient is aware of result note/Dr. Haney's note and understanding of starting alendronate.  No need to call patient back.   Sharon Streeter RN

## 2020-02-27 DIAGNOSIS — M81.0 OSTEOPOROSIS: Primary | ICD-10-CM

## 2020-02-27 RX ORDER — ALENDRONATE SODIUM 70 MG/1
70 TABLET ORAL
Qty: 12 TABLET | Refills: 3 | Status: SHIPPED | OUTPATIENT
Start: 2020-02-27 | End: 2020-04-16

## 2020-03-17 ENCOUNTER — DOCUMENTATION ONLY (OUTPATIENT)
Dept: LAB | Facility: CLINIC | Age: 85
End: 2020-03-17

## 2020-03-17 ENCOUNTER — TELEPHONE (OUTPATIENT)
Dept: INTERNAL MEDICINE | Facility: CLINIC | Age: 85
End: 2020-03-17

## 2020-03-17 DIAGNOSIS — I10 HYPERTENSION GOAL BP (BLOOD PRESSURE) < 140/90: Primary | ICD-10-CM

## 2020-03-17 DIAGNOSIS — N18.30 CKD (CHRONIC KIDNEY DISEASE) STAGE 3, GFR 30-59 ML/MIN (H): ICD-10-CM

## 2020-03-17 NOTE — TELEPHONE ENCOUNTER
Called patient made lab appointment and bp check when should we schedule telephone visit? Please place orders for lab.      Marissa CANSECO CMA ([facility])

## 2020-03-17 NOTE — TELEPHONE ENCOUNTER
Orders were placed. Schedule appointment after laboratory studies collected and when results are available     Max Haney MD

## 2020-03-19 NOTE — TELEPHONE ENCOUNTER
I don't think this is critical at all but just let patient  know, the reasons for laboratory is that    1. Blood pressure medication was adjusted with losartan ( Cozaar )   2. It is customary and appropriate to check basic metabolic panel around 2-4 weeks after adjustment with this medication , just to play it safe   3. It is ok if we adjust this timing for a month     Max Haney MD

## 2020-03-19 NOTE — TELEPHONE ENCOUNTER
2nd attempt. Left message for patient to return call to schedule phone visit. Patient has lab appointment scheduled on 3/19/20. Labs should be resulted by 3/20/20. Please schedule phone visit on or after 3/20/20.    Miguel Almonte CMA on 3/19/2020 at 8:08 AM

## 2020-03-19 NOTE — TELEPHONE ENCOUNTER
Both BP check (ancillary) and lab appointments for today were cancelled per patient's .      Routing to Dr. Haney to advise.  Mariel Laguna,

## 2020-03-19 NOTE — TELEPHONE ENCOUNTER
Spoke to patient and she said that she will have her  call us back when he gets home later to make a telephone visit    Miguel Almonte CMA on 3/19/2020 at 12:34 PM

## 2020-04-15 ENCOUNTER — MYC MEDICAL ADVICE (OUTPATIENT)
Dept: INTERNAL MEDICINE | Facility: CLINIC | Age: 85
End: 2020-04-15

## 2020-04-16 NOTE — TELEPHONE ENCOUNTER
Reached patients  .    Issues - treatment for osteoporosis and the diagnosis was made via a DEXA scan . She had a prescription for Fosamax (alendronate) for 12 pills.    His questions are about treatment and follow up questions. .    As it turns out she got the pills in a somewhat incorrect manner. One pill left and the others were taken incorrectly.    Denosumab injection (Prolia)     We also talked the lisinopril     We also talked about the probable ganglion cyst on her foot    These are all benign issues and no actions necessary at this point     We will discontinue Fosamax (alendronate) and reconsider treatment of osteoporosis with Denosumab injection (Prolia) down the road aways 3-6 months . Fosamax (alendronate) taken off the medication list     Max Haney MD

## 2020-04-16 NOTE — TELEPHONE ENCOUNTER
Replied via [employer] with additional questions regarding the mass. Await response.    Radha Merritt RN  [facility]

## 2020-06-18 ENCOUNTER — MYC MEDICAL ADVICE (OUTPATIENT)
Dept: INTERNAL MEDICINE | Facility: CLINIC | Age: 85
End: 2020-06-18

## 2020-06-19 ENCOUNTER — MYC MEDICAL ADVICE (OUTPATIENT)
Dept: INTERNAL MEDICINE | Facility: CLINIC | Age: 85
End: 2020-06-19

## 2020-06-19 NOTE — TELEPHONE ENCOUNTER
Sounds like this is out of our hands on the clinical team side  Not sure if provider wants to say anything further as provider has been communicating with patient regarding this issue    Kayleen Bustos RN

## 2020-06-22 ENCOUNTER — TELEPHONE (OUTPATIENT)
Dept: FAMILY MEDICINE | Facility: CLINIC | Age: 85
End: 2020-06-22

## 2020-06-22 DIAGNOSIS — R41.3 SHORT-TERM MEMORY LOSS: Primary | ICD-10-CM

## 2020-06-22 DIAGNOSIS — N18.30 CKD (CHRONIC KIDNEY DISEASE) STAGE 3, GFR 30-59 ML/MIN (H): ICD-10-CM

## 2020-06-22 DIAGNOSIS — I10 HYPERTENSION GOAL BP (BLOOD PRESSURE) < 140/90: ICD-10-CM

## 2020-06-22 DIAGNOSIS — I25.810 CORONARY ARTERY DISEASE INVOLVING AUTOLOGOUS ARTERY CORONARY BYPASS GRAFT WITHOUT ANGINA PECTORIS: ICD-10-CM

## 2020-06-22 PROBLEM — Z96.1 PSEUDOPHAKIA: Status: ACTIVE | Noted: 2017-07-25

## 2020-06-22 PROBLEM — H35.60: Status: ACTIVE | Noted: 2018-10-20

## 2020-06-22 NOTE — TELEPHONE ENCOUNTER
Reason for Call: Request for an order or referral:    Order or referral being requested: home health care aid    Date needed: as soon as possible    Has the patient been seen by the PCP for this problem? NO    Additional comments: Patient has dementia and patient  thinks she needs home health care.    Phone number Patient can be reached at:  Home number on file [phone] (home)    Best Time:  any    Can we leave a detailed message on this number?  YES    Call taken on 6/22/2020 at 8:27 AM by Teetee Kumar

## 2020-06-22 NOTE — TELEPHONE ENCOUNTER
Reason for call:  Other   Patient called regarding (reason for call): call back  Additional comments: [facility] is calling to let the patient and care team know that the patient needs to be seen in clinic in order for the referral to be valid.    Phone number to reach patient:  Home number on file [phone] (home)  [phone]    Best Time:  any    Can we leave a detailed message on this number?  YES    Travel screening: Negative

## 2020-06-22 NOTE — TELEPHONE ENCOUNTER
"Called Antoni, spouse  He is requesting HC for his spouse because \"something came up on Saturday (6/20/2020)\" that should be looked at  When asked what came up, he stated that patient developed hard red bumps in her pubic area that has now spread up the spine  No pain, blisters, or drainage but has some itching  He stated that patient has dementia and he has to keep reminding patient that the bumps are there  Symptoms have been about the same and he reported that he has never seen anything like this before  Advised that an OV is needed to have the bumps checked out for proper treatment  He immediately got upset, \"listen up lady, I have 1 leg\" and there is no way he can get patient into the clinic  Offered a video visit but he continued to be upset stating that he is 86 and does not work with technology  Explained that HC would likely require a recent visit in order to admit her into HC (last OV was 2/20/2020)  Antoni demanded that HC be ordered and stated that we need to change our recording that says that care is easy to get here  He stated that Dr. Haney better order HC and that he will not schedule patient for an appointment  (also explained that Dr. Haney is not in the clinic but a message will be sent to [facility])    Kayleen Bustos RN  "

## 2020-06-22 NOTE — PROGRESS NOTES
"Raeann Abdalla is a 84 year old female who is being evaluated via a billable telephone visit.      The patient has been notified of following:     \"This telephone visit will be conducted via a call between you and your physician/provider. We have found that certain health care needs can be provided without the need for a physical exam.  This service lets us provide the care you need with a short phone conversation.  If a prescription is necessary we can send it directly to your pharmacy.  If lab work is needed we can place an order for that and you can then stop by our lab to have the test done at a later time.    Telephone visits are billed at different rates depending on your insurance coverage. During this emergency period, for some insurers they may be billed the same as an in-person visit.  Please reach out to your insurance provider with any questions.    If during the course of the call the physician/provider feels a telephone visit is not appropriate, you will not be charged for this service.\"    Patient has given verbal consent for Telephone visit?  Yes    What phone number would you like to be contacted at? [phone]    How would you like to obtain your AVS? Alivia Gresham     Raeann Abdalla is a 84 year old female who presents via phone visit today for the following health issues:    HPI  Chief Complaint   Patient presents with     Referral     [facility] referral follow up        {PEDS Chronic and Acute Problems (Optional):912310}     {additonal problems for provider to add (Optional):430586}    {HIST REVIEW/ LINKS 2 (Optional):749575}    Reviewed and updated as needed this visit by Provider         Review of Systems   {ROS COMP (Optional):140978}       Objective   Reported vitals:  There were no vitals taken for this visit.   {GENERAL APPEARANCE:50::\"healthy\",\"alert\",\"no distress\"}  PSYCH: Alert and oriented times 3; coherent speech, normal   rate and volume, able to articulate logical thoughts, able " "  to abstract reason, no tangential thoughts, no hallucinations   or delusions  Her affect is { :8163826::\"normal\"}  RESP: No cough, no audible wheezing, able to talk in full sentences  Remainder of exam unable to be completed due to telephone visits    {Diagnostic Test Results (Optional):889719::\"Diagnostic Test Results:\",\"Labs reviewed in Epic\"}        Assessment/Plan:  {Diagnosis, Associated Orders and Comment:594242}    No follow-ups on file.      Phone call duration:  *** minutes    {signature options:957966}        "

## 2020-06-22 NOTE — TELEPHONE ENCOUNTER
I have made the [facility] referral, but a follow-up Video or in person visit will likely be needed within 30 days for insurance coverage     Brittny Bertrand MD

## 2020-06-22 NOTE — TELEPHONE ENCOUNTER
Called and spoke with Goldie from [facility] and gave verbal OK for orders below.     Bianka Esposito RN

## 2020-06-22 NOTE — TELEPHONE ENCOUNTER
Reason for call:  Order   Order or referral being requested:  For a    Reason for request:  A   Date needed: as soon as possible  Has the patient been seen by the PCP for this problem? NO    Additional comments:   unable to get her in     Phone number to reach patient:  Home number on file [phone] (home)    Best Time:   Any     Can we leave a detailed message on this number?  YES    Travel screening: Not Applicable

## 2020-06-23 ENCOUNTER — DOCUMENTATION ONLY (OUTPATIENT)
Dept: CARE COORDINATION | Facility: CLINIC | Age: 85
End: 2020-06-23

## 2020-06-23 NOTE — PROGRESS NOTES
[facility] now requests orders and shares plan of care/discharge summaries for some patients through [employer].  Please REPLY TO THIS MESSAGE OR ROUTE BACK TO THE AUTHOR in order to give authorization for orders when needed.  This is considered a verbal order, you will still receive a faxed copy of orders for signature.  Thank you for your assistance in improving collaboration for our patients.    In home assessment completed this morning by this nurse. She was found to have a large red raised rash with blisters that span from her left mid spine area around to left hip, groin, abdominal folds, and pubic area. No drainage. Appearance much like that of shingles. First appeared on Saturday 6/20. Spouse Antoni has been applying a mixture of urea, dimethocone, and bacitracin to this area daily and feels it has relieved some of the pain and itching she is experiencing. She plans to be seen in clinic by Dr. Jimenez on 6/24 as scheduled, Antoni is laid up due to a recent leg amputation but their son has agreed to bring her to the appointment. BP at visit today was 160/82, HR 59.     ORDERS BEING REQUESTED  Skilled nursing 1 week 1, 2 week 2, 1 week 1, 3 PRN for skin assessment and teaching, disease and symptom management, and teach and assess effects of medications, BP assessment.   SW to assist with available resources and transportation resources.     Also please note, patient takes the following medications differently.  Lisinopril: 10 mg daily, spouse reports it was changed by cardiologist with [facility] earlier this year.   Losartan: Does not take  Alendronate: Does not take      Marissa Sanchez, KELLIE Admission Clinician  [facility]  [phone]

## 2020-06-24 ENCOUNTER — OFFICE VISIT (OUTPATIENT)
Dept: FAMILY MEDICINE | Facility: CLINIC | Age: 85
End: 2020-06-24
Payer: MEDICARE

## 2020-06-24 VITALS
WEIGHT: 157.8 LBS | OXYGEN SATURATION: 99 % | DIASTOLIC BLOOD PRESSURE: 60 MMHG | SYSTOLIC BLOOD PRESSURE: 122 MMHG | BODY MASS INDEX: 27.96 KG/M2 | HEART RATE: 68 BPM | HEIGHT: 63 IN | TEMPERATURE: 98.4 F

## 2020-06-24 DIAGNOSIS — I25.810 CORONARY ARTERY DISEASE INVOLVING AUTOLOGOUS ARTERY CORONARY BYPASS GRAFT WITHOUT ANGINA PECTORIS: ICD-10-CM

## 2020-06-24 DIAGNOSIS — B02.9 HERPES ZOSTER WITHOUT COMPLICATION: Primary | ICD-10-CM

## 2020-06-24 DIAGNOSIS — Z95.2 S/P AVR (AORTIC VALVE REPLACEMENT): ICD-10-CM

## 2020-06-24 DIAGNOSIS — I10 HYPERTENSION GOAL BP (BLOOD PRESSURE) < 140/90: ICD-10-CM

## 2020-06-24 DIAGNOSIS — N18.30 CKD (CHRONIC KIDNEY DISEASE) STAGE 3, GFR 30-59 ML/MIN (H): ICD-10-CM

## 2020-06-24 DIAGNOSIS — Z96.643 S/P BILATERAL HIP REPLACEMENTS: ICD-10-CM

## 2020-06-24 DIAGNOSIS — F03.90 DEMENTIA WITHOUT BEHAVIORAL DISTURBANCE, UNSPECIFIED DEMENTIA TYPE: ICD-10-CM

## 2020-06-24 PROCEDURE — 99214 OFFICE O/P EST MOD 30 MIN: CPT | Performed by: FAMILY MEDICINE

## 2020-06-24 RX ORDER — VALACYCLOVIR HYDROCHLORIDE 1 G/1
1000 TABLET, FILM COATED ORAL 3 TIMES DAILY
Qty: 21 TABLET | Refills: 0 | Status: SHIPPED | OUTPATIENT
Start: 2020-06-24 | End: 2020-08-04

## 2020-06-24 ASSESSMENT — MIFFLIN-ST. JEOR: SCORE: 1131.97

## 2020-06-24 NOTE — PROGRESS NOTES
Subjective     Raeann Abdalla is a 84 year old female who presents to clinic today for the following health issues:    HPI   Patient presents with:  Referral: [facility] referral follow up   pt here for home health cert  Pt has Known Dementia and unabl to take care of herself   who was the caregiver has had surgery  Pt unable to dispends meds  Pt is Homebound and not able to leave Home without help  Forgets Things all the Time  Confused  No behavioral issues  Has a rash left Thoracic area which started a few days ago  No pain  itchy  Hypertension Follow-up      Do you check your blood pressure regularly outside of the clinic? No     Are you following a low salt diet? No    Are your blood pressures ever more than 140 on the top number (systolic) OR more   than 90 on the bottom number (diastolic), for example 140/90?  Does not check    Vascular Disease Follow-up      How often do you take nitroglycerin? Never    Do you take an aspirin every day? Yes    Chronic Kidney Disease Follow-up      Do you take any over the counter pain medicine?: No      Patient Active Problem List   Diagnosis     Hypertension goal BP (blood pressure) < 140/90     S/P AVR (aortic valve replacement)     S/P CABG (coronary artery bypass graft)     Short-term memory loss     S/P bilateral hip replacements     History of right-sided carotid endarterectomy     CKD (chronic kidney disease) stage 3, GFR 30-59 ml/min (H)     Hyperlipidemia LDL goal <70     Coronary artery disease involving autologous artery coronary bypass graft without angina pectoris     Posterior vitreous detachment, bilateral     Pseudophakia, ou     Hx of retinal hemorrhage     Past Surgical History:   Procedure Laterality Date     ABDOMEN SURGERY  Feb. 2014    aortic valve replacement     AORTIC VALVE REPLACEMENT       BYPASS GRAFT ARTERY CORONARY      X 3     CATARACT IOL, RT/LT       COLONOSCOPY  2010 ?    polyps found for first time     endarectomy Right     carotid     ENT  SURGERY       HEAD & NECK SURGERY      rt carotid artery neck plaque removal     JOINT REPLACEMENT, HIP RT/LT Bilateral      PHACOEMULSIFICATION WITH STANDARD INTRAOCULAR LENS IMPLANT  07/2017; 8/2017    right eye; left eye     THORACIC SURGERY  2014    aortic valve replacement, triple bypass     TONSILLECTOMY N/A      VASCULAR SURGERY  2013    rt carotid artery neck plaque removal       Social History     Tobacco Use     Smoking status: Never Smoker     Smokeless tobacco: Never Used   Substance Use Topics     Alcohol use: Yes     Alcohol/week: 0.0 standard drinks     Comment: 1 beer occasionally     Family History   Problem Relation Age of Onset     Other Cancer Sister         cervical     Macular Degeneration Mother 75     Other Cancer Mother         Ovarian     Coronary Artery Disease Father      Hypertension Father      Hyperlipidemia Father      Diabetes Father      Other Cancer Sister         cervical         Current Outpatient Medications   Medication Sig Dispense Refill     acetaminophen (TYLENOL) 500 MG tablet Take 500-1,000 mg by mouth every 6 hours as needed for mild pain       amoxicillin (AMOXIL) 500 MG capsule Take 4 capsules (2,000 mg) by mouth 1 hour prior to dental procedure. 4 capsule 1     aspirin 81 MG EC tablet Take 81 mg by mouth 2 times daily       carboxymethylcellulose (REFRESH PLUS) 0.5 % SOLN Place 1 drop into both eyes daily as needed for dry eyes       metoprolol tartrate (LOPRESSOR) 25 MG tablet TAKE 1 TABLET BY MOUTH TWICE DAILY 180 tablet 1     Multiple Vitamins-Minerals (MULTI VITAMIN/MINERALS PO) Take 1 tablet by mouth once Centrum Silver       simvastatin (ZOCOR) 40 MG tablet Take 40 mg by mouth       valACYclovir (VALTREX) 1000 mg tablet Take 1 tablet (1,000 mg) by mouth 3 times daily for 7 days 21 tablet 0     lisinopril (PRINIVIL/ZESTRIL) 5 MG tablet Take 1 tablet (5 mg) by mouth daily 90 tablet 1     losartan 50 MG PO tablet Take 1 tablet (50 mg) by mouth daily 90 tablet 0  "    Allergies   Allergen Reactions     Sulfa Drugs      Recent Labs   Lab Test 02/20/20  1150 08/02/19  0840  08/24/18  1005 10/19/17  1036  12/30/14   LDL  --  47  --  62 67   < > 91   HDL  --  54  --  45* 48*   < > 47   TRIG  --  132  --  151* 182*   < > 246   ALT  --   --   --   --   --   --  14   CR 0.99 1.00   < > 1.00  --    < > 1.12   GFRESTIMATED 53* 52*   < > 53*  --    < > 47   GFRESTBLACK 61 61   < > 64  --    < > 54   POTASSIUM 5.3 4.3   < > 5.2  --    < > 4.7    < > = values in this interval not displayed.      BP Readings from Last 3 Encounters:   06/24/20 122/60   02/20/20 (!) 142/68   08/02/19 124/86    Wt Readings from Last 3 Encounters:   06/24/20 71.6 kg (157 lb 12.8 oz)   02/20/20 75.3 kg (166 lb)   08/02/19 77.5 kg (170 lb 12.8 oz)                      Reviewed and updated as needed this visit by Provider         Review of Systems   CONSTITUTIONAL: NEGATIVE for fever, chills, change in weight  INTEGUMENTARY/SKIN: as above  ENT/MOUTH: NEGATIVE for ear, mouth and throat problems  RESP: NEGATIVE for significant cough or SOB  CV: NEGATIVE for chest pain, palpitations or peripheral edema  GI: NEGATIVE for nausea, abdominal pain, heartburn, or change in bowel habits  MUSCULOSKELETAL: none  NEURO: dementia      Objective    /60   Pulse 68   Temp 98.4  F (36.9  C) (Oral)   Ht 1.588 m (5' 2.5\")   Wt 71.6 kg (157 lb 12.8 oz)   SpO2 99%   BMI 28.40 kg/m    Body mass index is 28.4 kg/m .  Physical Exam   GENERAL: no distress and elderly  EYES: Eyes grossly normal to inspection, PERRL and conjunctivae and sclerae normal  NECK: no adenopathy, no asymmetry, masses, or scars and thyroid normal to palpation  RESP: lungs clear to auscultation - no rales, rhonchi or wheezes  CV: regular rate and rhythm, normal S1 S2, no S3 or S4, no murmur, click or rub, no peripheral edema and peripheral pulses strong  ABDOMEN: soft, nontender, no hepatosplenomegaly, no masses and bowel sounds normal  MS: no gross " musculoskeletal defects noted, no edema  SKIN:   SKIN: Erythematous, well demarcated eruption with inlfamed papules and vessicles in a dermatomal distribution on theleft Thoracic area going to her left Groin  Small Lump dorsal aspect Foot R  NEURO: dementia      Diagnostic Test Results:  Labs reviewed in Epic        Assessment & Plan       ICD-10-CM    1. Herpes zoster without complication  B02.9 HOME CARE NURSING REFERRAL     valACYclovir (VALTREX) 1000 mg tablet   2. Dementia without behavioral disturbance, unspecified dementia type (H)  F03.90 HOME CARE NURSING REFERRAL   3. CKD (chronic kidney disease) stage 3, GFR 30-59 ml/min (H)  N18.3 HOME CARE NURSING REFERRAL   4. Hypertension goal BP (blood pressure) < 140/90  I10    5. S/P AVR (aortic valve replacement)  Z95.2 HOME CARE NURSING REFERRAL   6. S/P bilateral hip replacements  Z96.643 HOME CARE NURSING REFERRAL   7. Coronary artery disease involving autologous artery coronary bypass graft without angina pectoris  I25.810 HOME CARE NURSING REFERRAL   pt is Homebound and needs help with ADL  Referral done  SEE [facility] care orders  The potential side effects of this medication have been discussed with the patient.  Call if any significant problems with these are experienced.  If rash not better follow up   Reassured about Lump on Foot  Follow up if any pain or worse  Return in about 3 months (around 9/24/2020) for recheck.    Liane Jimenez MD  [facility]

## 2020-06-26 NOTE — TELEPHONE ENCOUNTER
"MrLizz Abdalla, this patients  and primary caregiver is a somewhat particular man. I just want him to know that I had his wife's charges reviewed and the reason given below for the \" non-coverage\"  , this is his answer.    I really don't know if there's anything we can do. I think patient would accept this. Lets just let him know that I    1. tried to help him  2. Found the reason  3. Be sure not to request annual wellness visit sooner then one year   4. We are sorry for this mix up. Maybe within a workflow it could be noted before a patient schedules this type of visit that it would be flagged as a non-covered medical expense ? Perhaps this could just be mentioned at an OPS meeting but otherwise , you can close this encounter afterwards or Reroute if additional input requested from me     Max Haney MD    "

## 2020-06-29 DIAGNOSIS — Z53.9 DIAGNOSIS NOT YET DEFINED: Primary | ICD-10-CM

## 2020-06-29 PROCEDURE — G0180 MD CERTIFICATION HHA PATIENT: HCPCS | Performed by: INTERNAL MEDICINE

## 2020-07-07 ENCOUNTER — TELEPHONE (OUTPATIENT)
Dept: FAMILY MEDICINE | Facility: CLINIC | Age: 85
End: 2020-07-07

## 2020-07-07 NOTE — TELEPHONE ENCOUNTER
Reason for call:  Other   Patient called regarding (reason for call): ERIKA  Additional comments: Christiane from [facility] is calling with an update, patient completed shingles medication and the shingles dried up. There are no more concerns and she will be discharged from home care this Thursday.      Phone number to reach patient:  Other phone number:  [phone]    Best Time:  any    Can we leave a detailed message on this number?  YES    Travel screening: Not Applicable

## 2020-08-01 DIAGNOSIS — I10 BENIGN ESSENTIAL HYPERTENSION: ICD-10-CM

## 2020-08-03 RX ORDER — METOPROLOL TARTRATE 25 MG/1
TABLET, FILM COATED ORAL
Qty: 180 TABLET | Refills: 0 | Status: SHIPPED | OUTPATIENT
Start: 2020-08-03 | End: 2020-11-07

## 2020-08-04 ENCOUNTER — OFFICE VISIT (OUTPATIENT)
Dept: INTERNAL MEDICINE | Facility: CLINIC | Age: 85
End: 2020-08-04
Payer: MEDICARE

## 2020-08-04 VITALS
RESPIRATION RATE: 16 BRPM | WEIGHT: 153.6 LBS | TEMPERATURE: 98.3 F | DIASTOLIC BLOOD PRESSURE: 78 MMHG | SYSTOLIC BLOOD PRESSURE: 126 MMHG | BODY MASS INDEX: 27.65 KG/M2 | OXYGEN SATURATION: 97 % | HEART RATE: 71 BPM

## 2020-08-04 DIAGNOSIS — M81.0 AGE-RELATED OSTEOPOROSIS WITHOUT CURRENT PATHOLOGICAL FRACTURE: ICD-10-CM

## 2020-08-04 DIAGNOSIS — I10 HYPERTENSION GOAL BP (BLOOD PRESSURE) < 140/90: Primary | ICD-10-CM

## 2020-08-04 DIAGNOSIS — M71.379 SYNOVIAL CYST OF ANKLE AND FOOT REGION: ICD-10-CM

## 2020-08-04 LAB
ANION GAP SERPL CALCULATED.3IONS-SCNC: 6 MMOL/L (ref 3–14)
BUN SERPL-MCNC: 11 MG/DL (ref 7–30)
CALCIUM SERPL-MCNC: 9.5 MG/DL (ref 8.5–10.1)
CHLORIDE SERPL-SCNC: 102 MMOL/L (ref 94–109)
CO2 SERPL-SCNC: 29 MMOL/L (ref 20–32)
CREAT SERPL-MCNC: 0.91 MG/DL (ref 0.52–1.04)
GFR SERPL CREATININE-BSD FRML MDRD: 58 ML/MIN/{1.73_M2}
GLUCOSE SERPL-MCNC: 94 MG/DL (ref 70–99)
POTASSIUM SERPL-SCNC: 4.4 MMOL/L (ref 3.4–5.3)
SODIUM SERPL-SCNC: 137 MMOL/L (ref 133–144)

## 2020-08-04 PROCEDURE — 36415 COLL VENOUS BLD VENIPUNCTURE: CPT | Performed by: INTERNAL MEDICINE

## 2020-08-04 PROCEDURE — 80048 BASIC METABOLIC PNL TOTAL CA: CPT | Performed by: INTERNAL MEDICINE

## 2020-08-04 PROCEDURE — 99214 OFFICE O/P EST MOD 30 MIN: CPT | Performed by: INTERNAL MEDICINE

## 2020-08-04 NOTE — LETTER
August 5, 2020      Raeann Abdalla  [address] [place] [id number]          Dear ,    We are writing to inform you of your test results.  All of these tests are within acceptable limits , things look good !       Resulted Orders   Basic metabolic panel   Result Value Ref Range    Sodium 137 133 - 144 mmol/L    Potassium 4.4 3.4 - 5.3 mmol/L    Chloride 102 94 - 109 mmol/L    Carbon Dioxide 29 20 - 32 mmol/L    Anion Gap 6 3 - 14 mmol/L    Glucose 94 70 - 99 mg/dL      Comment:      Non Fasting    Urea Nitrogen 11 7 - 30 mg/dL    Creatinine 0.91 0.52 - 1.04 mg/dL    GFR Estimate 58 (L) >60 mL/min/[1.73_m2]      Comment:      Non  GFR Calc  Starting 12/18/2018, serum creatinine based estimated GFR (eGFR) will be   calculated using the Chronic Kidney Disease Epidemiology Collaboration   (CKD-EPI) equation.      GFR Estimate If Black 67 >60 mL/min/[1.73_m2]      Comment:       GFR Calc  Starting 12/18/2018, serum creatinine based estimated GFR (eGFR) will be   calculated using the Chronic Kidney Disease Epidemiology Collaboration   (CKD-EPI) equation.      Calcium 9.5 8.5 - 10.1 mg/dL       If you have any questions or concerns, please call the clinic at the number listed above.       Sincerely,        Max Haney MD

## 2020-08-04 NOTE — PROGRESS NOTES
Subjective     Raeann Abdalla is a 85 year old female who presents to clinic today for the following health issues:    HPI     Chief Complaint   Patient presents with     Recheck Medication     Potassium, medication refill      Derm Problem     Rt ankle top         Hypertension goal BP (blood pressure) < 140/90  Age-related osteoporosis without current pathological fracture  Synovial cyst of ankle and foot region     Has been taking lisinopril for quite some time. Medication is reviewed . She's due for an basic metabolic panel and potassium check. Otherwise is generally doing well and  Antoni has no specific worries about his wife. She is sweet and unfortunately pleasantly demented elderly woman who cannot participate in this interview in any meaningful way.    We also reviewed a diagnosis of osteoporosis and patient's indication for Denosumab injection (Prolia). This will be started within the near future.    Finally a nearly acorn sized synovial cyst is noted on top of the right ankle. There are no worrisome features here. She is offered an orthopedic consultation but are reassured that this is a harmless phenomenon.    Patient Active Problem List   Diagnosis     Hypertension goal BP (blood pressure) < 140/90     S/P AVR (aortic valve replacement)     S/P CABG (coronary artery bypass graft)     Short-term memory loss     S/P bilateral hip replacements     History of right-sided carotid endarterectomy     CKD (chronic kidney disease) stage 3, GFR 30-59 ml/min (H)     Hyperlipidemia LDL goal <70     Coronary artery disease involving autologous artery coronary bypass graft without angina pectoris     Posterior vitreous detachment, bilateral     Pseudophakia, ou     Hx of retinal hemorrhage     Past Surgical History:   Procedure Laterality Date     ABDOMEN SURGERY  Feb. 2014    aortic valve replacement     AORTIC VALVE REPLACEMENT       BYPASS GRAFT ARTERY CORONARY      X 3     CATARACT IOL, RT/LT        COLONOSCOPY  2010 ?    polyps found for first time     endarectomy Right     carotid     ENT SURGERY       HEAD & NECK SURGERY      rt carotid artery neck plaque removal     JOINT REPLACEMENT, HIP RT/LT Bilateral      PHACOEMULSIFICATION WITH STANDARD INTRAOCULAR LENS IMPLANT  07/2017; 8/2017    right eye; left eye     THORACIC SURGERY  2014    aortic valve replacement, triple bypass     TONSILLECTOMY N/A      VASCULAR SURGERY  2013    rt carotid artery neck plaque removal       Social History     Tobacco Use     Smoking status: Never Smoker     Smokeless tobacco: Never Used   Substance Use Topics     Alcohol use: Yes     Alcohol/week: 0.0 standard drinks     Comment: 1 beer occasionally     Family History   Problem Relation Age of Onset     Other Cancer Sister         cervical     Macular Degeneration Mother 75     Other Cancer Mother         Ovarian     Coronary Artery Disease Father      Hypertension Father      Hyperlipidemia Father      Diabetes Father      Other Cancer Sister         cervical         Current Outpatient Medications   Medication Sig Dispense Refill     acetaminophen (TYLENOL) 500 MG tablet Take 500-1,000 mg by mouth every 6 hours as needed for mild pain       amoxicillin (AMOXIL) 500 MG capsule Take 4 capsules (2,000 mg) by mouth 1 hour prior to dental procedure. 4 capsule 1     aspirin 81 MG EC tablet Take 81 mg by mouth 2 times daily       carboxymethylcellulose (REFRESH PLUS) 0.5 % SOLN Place 1 drop into both eyes daily as needed for dry eyes       lisinopril (ZESTRIL) 10 MG tablet Take 1 tablet (10 mg) by mouth daily       metoprolol tartrate (LOPRESSOR) 25 MG tablet Take 1 tablet by mouth twice daily 180 tablet 0     Multiple Vitamins-Minerals (MULTI VITAMIN/MINERALS PO) Take 1 tablet by mouth once Centrum Silver       simvastatin (ZOCOR) 40 MG tablet Take 40 mg by mouth       valACYclovir (VALTREX) 1000 mg tablet Take 1 tablet (1,000 mg) by mouth 3 times daily for 7 days 21 tablet 0      Allergies   Allergen Reactions     Sulfa Drugs      Recent Labs   Lab Test 02/20/20  1150 08/02/19  0840  08/24/18  1005 10/19/17  1036  12/30/14   LDL  --  47  --  62 67   < > 91   HDL  --  54  --  45* 48*   < > 47   TRIG  --  132  --  151* 182*   < > 246   ALT  --   --   --   --   --   --  14   CR 0.99 1.00   < > 1.00  --    < > 1.12   GFRESTIMATED 53* 52*   < > 53*  --    < > 47   GFRESTBLACK 61 61   < > 64  --    < > 54   POTASSIUM 5.3 4.3   < > 5.2  --    < > 4.7    < > = values in this interval not displayed.      BP Readings from Last 3 Encounters:   08/04/20 126/78   06/24/20 122/60   02/20/20 (!) 142/68    Wt Readings from Last 3 Encounters:   08/04/20 69.7 kg (153 lb 9.6 oz)   06/24/20 71.6 kg (157 lb 12.8 oz)   02/20/20 75.3 kg (166 lb)                    Reviewed and updated as needed this visit by Provider         Review of Systems   Constitutional, HEENT, cardiovascular, pulmonary, gi and gu systems are negative, except as otherwise noted.      Objective    /78   Pulse 71   Temp 98.3  F (36.8  C) (Oral)   Resp 16   Wt 69.7 kg (153 lb 9.6 oz)   SpO2 97%   BMI 27.65 kg/m    Body mass index is 27.65 kg/m .  Physical Exam   GENERAL: healthy, alert and no distress  EYES: Eyes grossly normal to inspection, PERRL and conjunctivae and sclerae normal  RESP: lungs clear to auscultation - no rales, rhonchi or wheezes  CV: regular rate and rhythm, normal S1 S2, no S3 or S4, no murmur, click or rub, no peripheral edema and peripheral pulses strong  MS: no gross musculoskeletal defects noted, no edema, right ankle synovial cyst as detailed above   NEURO: Normal strength and tone, sensory exam grossly normal and abnormal mental status - consistent with Alzhemiers dementia   PSYCH: affect normal/bright    Diagnostic Test Results:  Labs reviewed in Epic  Orders Placed This Encounter   Procedures     Basic metabolic panel             Assessment & Plan     (I10) Hypertension goal BP (blood pressure) <  140/90  (primary encounter diagnosis)  Comment: controlled within acceptable limits , due for recheck laboratory   Plan: Basic metabolic panel        Follow up as indicated on results     (M81.0) Age-related osteoporosis without current pathological fracture  Comment: ordered   Plan: denosumab (PROLIA) injection 60 mg            (M71.379) Synovial cyst of ankle and foot region  Comment: reviewed   Plan: referral order to orthopedic not done as this isn't especially symptomatic nor concerning . We discussed what to be on the watch for . Update me if significant changes have taken place      Other items cleaned up from Care gaps within the healthcare maintenance section of the chart     Return in about 6 months (around 2/4/2021).    Max Haney MD  [facility]

## 2020-08-05 ENCOUNTER — TELEPHONE (OUTPATIENT)
Dept: INTERNAL MEDICINE | Facility: CLINIC | Age: 85
End: 2020-08-05

## 2020-08-05 DIAGNOSIS — Z92.29 PERSONAL HISTORY OF OTHER DRUG THERAPY: ICD-10-CM

## 2020-08-05 DIAGNOSIS — M81.0 AGE-RELATED OSTEOPOROSIS WITHOUT CURRENT PATHOLOGICAL FRACTURE: Primary | ICD-10-CM

## 2020-08-05 NOTE — TELEPHONE ENCOUNTER
New order placed with additional diagnosis.     Bianka Esposito RN    ---------------------    From: Max Haney MD   Sent: 8/5/2020   1:23 PM CDT   To: Mariel Laguna   Subject: RE: Prolia                                       Z92.29 - Personal history of other drug therapy     Max Haney MD       -------------------------------------------    Hello,       This patient is scheduled for prolia.  With their insurance coverage, it follows Medicare's NGS policy.  Currently we have the DX M81.0 but this request will need an additional diagnosis to support why this patient is not appropriate for other therapies.  Below is a list of secondary diagnosis that would follow Medicare's policy.     Z92.29 - Personal history of other drug therapy       T50.995S - Adverse effect of other drugs, medicaments and biological substances, sequela   T88.7XXS - Unspecified adverse effect of drug or medicament, sequela   Z88.8 - Allergy status to other drugs, medicaments and biological substances status     N18.3 - Chronic kidney disease, stage 3 (moderate)   N18.4 - Chronic kidney disease, stage 4 (severe)   N18.5 - Chronic kidney disease, stage 5     Would an additional diagnosis code apply to this patient? If so, can you please have this added to the CAM order as the secondary diagnosis?  Please let me know when this is completed and I can proceed with verifying patient's benefit for this medication.     Thank you,     Erwin Esposito RN

## 2020-08-18 NOTE — TELEPHONE ENCOUNTER
Routing to Dr. Haney's covering pool to advise if any labs are needed prior to patient having her prolia injection.  Mariel Laguna,

## 2020-08-18 NOTE — TELEPHONE ENCOUNTER
Spoke to patient's .      Lab appointment scheduled for Thursday, August 20th at 1:45 p.m.    Prolia injection scheduled for Thursday, August 27th at 2:30 p.m.    Patient has not had any dental work, extractions or jaw bone surgery within the past month.    Message sent to Anneliese MENA) to order medication. Mariel Laguna,

## 2020-08-20 DIAGNOSIS — M81.0 AGE-RELATED OSTEOPOROSIS WITHOUT CURRENT PATHOLOGICAL FRACTURE: ICD-10-CM

## 2020-08-20 LAB
CALCIUM SERPL-MCNC: 9.1 MG/DL (ref 8.5–10.1)
MAGNESIUM SERPL-MCNC: 2.2 MG/DL (ref 1.6–2.3)
PHOSPHATE SERPL-MCNC: 3.8 MG/DL (ref 2.5–4.5)

## 2020-08-20 PROCEDURE — 82310 ASSAY OF CALCIUM: CPT | Performed by: NURSE PRACTITIONER

## 2020-08-20 PROCEDURE — 36415 COLL VENOUS BLD VENIPUNCTURE: CPT | Performed by: NURSE PRACTITIONER

## 2020-08-20 PROCEDURE — 83735 ASSAY OF MAGNESIUM: CPT | Performed by: NURSE PRACTITIONER

## 2020-08-20 PROCEDURE — 84100 ASSAY OF PHOSPHORUS: CPT | Performed by: NURSE PRACTITIONER

## 2020-08-21 NOTE — RESULT ENCOUNTER NOTE
Deaelis Cary,    Your recent test results are attached.      Your lab results are normal.    If you have any questions please feel free to contact [phone] or myself via [employer].    Sincerely,  Luz Coughlin, CNP

## 2020-08-27 ENCOUNTER — ALLIED HEALTH/NURSE VISIT (OUTPATIENT)
Dept: NURSING | Facility: CLINIC | Age: 85
End: 2020-08-27
Payer: MEDICARE

## 2020-08-27 DIAGNOSIS — M81.0 OSTEOPOROSIS: Primary | ICD-10-CM

## 2020-08-27 PROCEDURE — 96372 THER/PROPH/DIAG INJ SC/IM: CPT

## 2020-08-27 NOTE — Clinical Note
Patient had first prolia injection, I used osteoporosis for diagnosis (from Dexa results)but there is no diagnosis on problem list- can you please add diagnosis

## 2020-08-27 NOTE — TELEPHONE ENCOUNTER
Postponing this encounter-    Patient due for Prolia injection around 2/27/2021    1. Please ask provider to put in a new CAM order for (Pre Auth) Prolia injection if one is not current and any lab orders if needed (it is up to the provider's discretion on how often labs are checked once Prolia injections have been started)    2. Please contact patient to schedule the injection with RN 2 weeks out    3. Ask MA to re-order med 1 week in advance    Make sure patient has not had any dental work including the jaw bone (i.e teeth extraction) 1 month prior to injection    For questions about the cost, patient may contact our CONSUMER PRICE LINE at [phone] for an estimate.     NO ABN IS NEEDED UNLESS COVERAGE CANNOT BE GUARENTEED (OFF LABEL USE, PA STILL IN PROCESS, INSURANCE PENDING, ETC)    CHECK STATUS PRIOR TO ADMINISTRATION- Chart Review > Referrals tab - Select the Referral to view the report    Alyson Soria RN

## 2020-08-27 NOTE — PROGRESS NOTES
Clinic Administered Medication Documentation      Prolia Documentation    Prior to injection, verified patient identity using patient's name and date of birth. Medication was administered. Please see MAR and medication order for additional information. Patient instructed to remain in clinic for 15 minutes and report any adverse reaction to staff immediately .    Indication: Prolia  (denosumab) is a prescription medicine used to treat osteoporosis in patients who:     Are at high risk for fracture, meaning patients who have had a fracture related to osteoporosis, or who have multiple risk factors for fracture.    Cannot use another osteoporosis medicine or other osteoporosis medicines did not work well.    The timeline for early/late injections would be 4 weeks early and any time after the 6 month eloisa. If a patient receives their injection late, then the subsequent injection would be 6 months from the date that they actually received the injection.    1.  When was the last injection?  First injection   2.  Did they check with their insurance for this calendar year?  yes  3.  Is there an order in the chart?  yes  4.  Has the patient had dental work involving the bone in the past month or will have work in the next 6 months?  No    The following steps were completed to comply with the REMS program for Prolia:    Reviewed information in the Medication Guide and Patient Counseling Chart, including the serious risks of Prolia  and the symptoms of each risk.    Advised patient to seek prompt medical attention if they have signs or symptoms of any of the serious risks.    Provided each patient a copy of the Medication Guide and Patient Brochure.    Was entire vial of medication used? Yes  Vial/Syringe: Single dose vial  Expiration Date:  10/22  Was this medication supplied by the patient? No     Alyson Soria RN

## 2020-09-23 ENCOUNTER — OFFICE VISIT (OUTPATIENT)
Dept: OPHTHALMOLOGY | Facility: CLINIC | Age: 85
End: 2020-09-23
Payer: MEDICARE

## 2020-09-23 DIAGNOSIS — Z96.1 PSEUDOPHAKIA: Primary | ICD-10-CM

## 2020-09-23 DIAGNOSIS — Z01.00 EXAMINATION OF EYES AND VISION: ICD-10-CM

## 2020-09-23 DIAGNOSIS — H35.60 RETINAL HEMORRHAGE, UNSPECIFIED LATERALITY: ICD-10-CM

## 2020-09-23 DIAGNOSIS — H43.813 POSTERIOR VITREOUS DETACHMENT, BILATERAL: ICD-10-CM

## 2020-09-23 DIAGNOSIS — H52.4 PRESBYOPIA: ICD-10-CM

## 2020-09-23 PROCEDURE — 92014 COMPRE OPH EXAM EST PT 1/>: CPT | Performed by: OPHTHALMOLOGY

## 2020-09-23 PROCEDURE — 92015 DETERMINE REFRACTIVE STATE: CPT | Mod: GY | Performed by: OPHTHALMOLOGY

## 2020-09-23 ASSESSMENT — REFRACTION_MANIFEST
OS_ADD: +3.00
OS_AXIS: 177
OD_ADD: +3.00
OD_SPHERE: -0.75
OD_CYLINDER: +0.75
OD_AXIS: 132
OS_SPHERE: -1.25
OS_CYLINDER: +1.25

## 2020-09-23 ASSESSMENT — TONOMETRY
OD_IOP_MMHG: 14
IOP_METHOD: APPLANATION
OS_IOP_MMHG: 16

## 2020-09-23 ASSESSMENT — CUP TO DISC RATIO
OS_RATIO: 0.2
OD_RATIO: 0.2

## 2020-09-23 ASSESSMENT — SLIT LAMP EXAM - LIDS
COMMENTS: 2+ DERMATOCHALASIS - UPPER LID
COMMENTS: 2+ DERMATOCHALASIS - UPPER LID

## 2020-09-23 ASSESSMENT — REFRACTION_WEARINGRX
OS_CYLINDER: +1.00
SPECS_TYPE: PAL
OD_ADD: +3.00
OD_CYLINDER: +1.00
OS_SPHERE: -1.00
OS_ADD: +3.00
OD_AXIS: 138
OD_SPHERE: -0.75
OS_AXIS: 021

## 2020-09-23 ASSESSMENT — VISUAL ACUITY
OD_CC+: +3
METHOD: SNELLEN - LINEAR
OS_CC: 20/40
OS_CC+: +2
CORRECTION_TYPE: GLASSES
OD_CC: 20/30

## 2020-09-23 ASSESSMENT — EXTERNAL EXAM - RIGHT EYE: OD_EXAM: MILD BROW, PROLAPSED FAT PADS: UPPER, LOWER

## 2020-09-23 ASSESSMENT — EXTERNAL EXAM - LEFT EYE: OS_EXAM: MILD BROW, PROLAPSED FAT PADS: UPPER, LOWER

## 2020-09-23 ASSESSMENT — CONF VISUAL FIELD
OD_NORMAL: 1
METHOD: COUNTING FINGERS
OS_NORMAL: 1

## 2020-09-23 NOTE — PROGRESS NOTES
Current Eye Medications:  none     Subjective:  Complete eye exam. Vision is doing fine distance and near both eyes. No eye pain or discomfort in either eye.      Objective:  See Ophthalmology Exam.       Assessment:  Stable eye exam.      ICD-10-CM    1. Pseudophakia, ou  Z96.1    2. Hx of retinal hemorrhage  H35.60    3. Posterior vitreous detachment, bilateral  H43.813    4. Examination of eyes and vision  Z01.00    5. Presbyopia  H52.4 REFRACTION     EYE EXAM (SIMPLE-NONBILLABLE)        Plan:  Glasses Rx given - optional  Use artificial tears up to 4 times daily both eyes.  (Refresh Tears, Systane Ultra/Balance, or Theratears)  Call in May 2021 for an appointment in September 2021 for Complete Exam    Dr. Rosario [phone]

## 2020-09-23 NOTE — PATIENT INSTRUCTIONS
Glasses Rx given - optional  Use artificial tears up to 4 times daily both eyes.  (Refresh Tears, Systane Ultra/Balance, or Theratears)  Call in May 2021 for an appointment in September 2021 for Complete Exam    Dr. Rosario [phone]

## 2020-09-23 NOTE — LETTER
9/23/2020         RE: Raeann JEONG Day  [address] [place], [place] [address] [address]        Dear Colleague,    Thank you for referring your patient, Raeann Abdalla, to the [facility]. Please see a copy of my visit note below.     Current Eye Medications:  none     Subjective:  Complete eye exam. Vision is doing fine distance and near both eyes. No eye pain or discomfort in either eye.      Objective:  See Ophthalmology Exam.       Assessment:  Stable eye exam.      ICD-10-CM    1. Pseudophakia, ou  Z96.1    2. Hx of retinal hemorrhage  H35.60    3. Posterior vitreous detachment, bilateral  H43.813    4. Examination of eyes and vision  Z01.00    5. Presbyopia  H52.4 REFRACTION     EYE EXAM (SIMPLE-NONBILLABLE)        Plan:  Glasses Rx given - optional  Use artificial tears up to 4 times daily both eyes.  (Refresh Tears, Systane Ultra/Balance, or Theratears)  Call in May 2021 for an appointment in September 2021 for Complete Exam    Dr. Rosario [phone]         Again, thank you for allowing me to participate in the care of your patient.        Sincerely,        Ifeanyi Rosario MD

## 2020-10-05 ENCOUNTER — TELEPHONE (OUTPATIENT)
Dept: INTERNAL MEDICINE | Facility: CLINIC | Age: 85
End: 2020-10-05

## 2020-10-05 ENCOUNTER — MYC MEDICAL ADVICE (OUTPATIENT)
Dept: FAMILY MEDICINE | Facility: CLINIC | Age: 85
End: 2020-10-05

## 2020-10-05 DIAGNOSIS — E78.5 HYPERLIPIDEMIA LDL GOAL <100: Primary | ICD-10-CM

## 2020-10-05 RX ORDER — SIMVASTATIN 40 MG
40 TABLET ORAL AT BEDTIME
Qty: 90 TABLET | Refills: 3 | Status: SHIPPED | OUTPATIENT
Start: 2020-10-05 | End: 2021-09-20

## 2020-10-05 NOTE — TELEPHONE ENCOUNTER
Dr. Haney- Pt's spouse is requesting that you take over prescribing simvastatin (previously prescribed by cardiology) and place a lab order for pt so that she can complete on . Orders cued, Please review/sign or advise.      Scheduled pt for lab appt  at 10 am, will need to send notification via [employer] once orders placed, that pt should come fasting and that lab appt was made.    [facility] messages were sent regarding pt through her spouse's mychart:  Antoni Abdalla Reuben, MD 13 minutes ago (11:36 AM)  The Lipid Panel will be drawn during my 9:50 A.M. 11-3-2020 Wilfrid't with Dr Haney, correct?  The prescription will go to [facility], [place], Correct? Simvastatin, 40 Mg, at bedtime ,(once daily)  I know you are busy. Thank you very much!  Jose Abdalla for Raeann    Antoni Abdalla Reuben, MD   My problem: On 10-1 [employer] sent an e-mail saying Raeann's Simvastatin prescription will  on 10-. I reordered. That's strange, because on 10-1-2020 they ordered a prescription from the Cardiologist ,(now retired) . That triggered a LENGTHY letter on 10-1-2020 from the replacement Cardiologist that gave me  several alternatives about where to get a blood draw as they hadn't seen one  in over a year.  I would like to Have the blood draw during my 11-3 appointment.   I would also like to get this new Cardiologist out of the loop and have Dr. Haney monitor the Lipids and write the Prescriptions if he feels comfortable with that.    Antoni Abdalla Reuben, MD   I have an wilfrid't on 11-3-2020 ,9:50 A.M.  May I get a blood draw for a Lipid Panel for Spouse Raeann at the same time? It was last done about Aug. of 2019.  Raeann's Cardiologist,Martin Allen wants it so she can continue to prescribe statins . Kimo, I would prefer that you moniter the Lipids and prescribe the Statins. do you agree?  Thank you, The 1 legged [place]  
Fasting lipid panel ordered       Full year of medication ordered     Max Haney MD    
[employer] [facility] [employer] message sent.   
Name band;

## 2020-11-03 ENCOUNTER — MYC MEDICAL ADVICE (OUTPATIENT)
Dept: INTERNAL MEDICINE | Facility: CLINIC | Age: 85
End: 2020-11-03

## 2020-11-03 DIAGNOSIS — E78.5 HYPERLIPIDEMIA LDL GOAL <100: ICD-10-CM

## 2020-11-03 DIAGNOSIS — N18.30 CKD (CHRONIC KIDNEY DISEASE) STAGE 3, GFR 30-59 ML/MIN (H): ICD-10-CM

## 2020-11-03 DIAGNOSIS — I10 HYPERTENSION GOAL BP (BLOOD PRESSURE) < 140/90: ICD-10-CM

## 2020-11-03 LAB
ANION GAP SERPL CALCULATED.3IONS-SCNC: 5 MMOL/L (ref 3–14)
BUN SERPL-MCNC: 10 MG/DL (ref 7–30)
CALCIUM SERPL-MCNC: 9.3 MG/DL (ref 8.5–10.1)
CHLORIDE SERPL-SCNC: 105 MMOL/L (ref 94–109)
CHOLEST SERPL-MCNC: 122 MG/DL
CO2 SERPL-SCNC: 27 MMOL/L (ref 20–32)
CREAT SERPL-MCNC: 0.94 MG/DL (ref 0.52–1.04)
GFR SERPL CREATININE-BSD FRML MDRD: 56 ML/MIN/{1.73_M2}
GLUCOSE SERPL-MCNC: 102 MG/DL (ref 70–99)
HDLC SERPL-MCNC: 66 MG/DL
LDLC SERPL CALC-MCNC: 29 MG/DL
NONHDLC SERPL-MCNC: 56 MG/DL
POTASSIUM SERPL-SCNC: 4.8 MMOL/L (ref 3.4–5.3)
SODIUM SERPL-SCNC: 137 MMOL/L (ref 133–144)
TRIGL SERPL-MCNC: 133 MG/DL

## 2020-11-03 PROCEDURE — 80061 LIPID PANEL: CPT | Performed by: INTERNAL MEDICINE

## 2020-11-03 PROCEDURE — 36415 COLL VENOUS BLD VENIPUNCTURE: CPT | Performed by: INTERNAL MEDICINE

## 2020-11-03 PROCEDURE — 80048 BASIC METABOLIC PNL TOTAL CA: CPT | Performed by: INTERNAL MEDICINE

## 2020-11-07 ENCOUNTER — MYC REFILL (OUTPATIENT)
Dept: FAMILY MEDICINE | Facility: CLINIC | Age: 85
End: 2020-11-07

## 2020-11-07 DIAGNOSIS — I10 BENIGN ESSENTIAL HYPERTENSION: ICD-10-CM

## 2020-11-09 RX ORDER — METOPROLOL TARTRATE 25 MG/1
25 TABLET, FILM COATED ORAL 2 TIMES DAILY
Qty: 180 TABLET | Refills: 0 | Status: SHIPPED | OUTPATIENT
Start: 2020-11-09 | End: 2021-02-08

## 2020-11-09 RX ORDER — METOPROLOL TARTRATE 25 MG/1
TABLET, FILM COATED ORAL
Qty: 180 TABLET | Refills: 0 | OUTPATIENT
Start: 2020-11-09

## 2020-12-06 ENCOUNTER — HEALTH MAINTENANCE LETTER (OUTPATIENT)
Age: 85
End: 2020-12-06

## 2021-02-06 ENCOUNTER — MYC MEDICAL ADVICE (OUTPATIENT)
Dept: INTERNAL MEDICINE | Facility: CLINIC | Age: 86
End: 2021-02-06

## 2021-02-06 DIAGNOSIS — I10 BENIGN ESSENTIAL HYPERTENSION: ICD-10-CM

## 2021-02-08 RX ORDER — METOPROLOL TARTRATE 25 MG/1
25 TABLET, FILM COATED ORAL 2 TIMES DAILY
Qty: 180 TABLET | Refills: 1 | Status: SHIPPED | OUTPATIENT
Start: 2021-02-08 | End: 2021-08-03

## 2021-02-23 ENCOUNTER — TELEPHONE (OUTPATIENT)
Dept: INTERNAL MEDICINE | Facility: CLINIC | Age: 86
End: 2021-02-23

## 2021-02-23 DIAGNOSIS — N18.31 STAGE 3A CHRONIC KIDNEY DISEASE (H): ICD-10-CM

## 2021-02-23 DIAGNOSIS — M81.0 AGE-RELATED OSTEOPOROSIS WITHOUT CURRENT PATHOLOGICAL FRACTURE: Primary | ICD-10-CM

## 2021-02-24 PROBLEM — M81.0 AGE-RELATED OSTEOPOROSIS WITHOUT CURRENT PATHOLOGICAL FRACTURE: Status: ACTIVE | Noted: 2021-02-24

## 2021-02-24 NOTE — TELEPHONE ENCOUNTER
Patient is due for next prolia injection 2/27/21. Please place new cam order if appropriate. Does patient need lab prior to injection?

## 2021-02-25 NOTE — TELEPHONE ENCOUNTER
CAM order for prolia placed. Routing to team to schedule 2 weeks out and order medication.    Radha Merritt RN  [facility]

## 2021-02-25 NOTE — TELEPHONE ENCOUNTER
Additional laboratory studies not needed for this upcoming Denosumab injection (Prolia) .    Ordered are placed    Reroute if additional input requested from sonali Haney MD

## 2021-02-26 NOTE — TELEPHONE ENCOUNTER
Called and spoke with Raeann and LUCILLE DE LEON.    Patient is scheduled for her injection on 3/4/2021.    Email sent to order medications.    Soila Taylor,

## 2021-03-04 ENCOUNTER — ALLIED HEALTH/NURSE VISIT (OUTPATIENT)
Dept: NURSING | Facility: CLINIC | Age: 86
End: 2021-03-04
Payer: MEDICARE

## 2021-03-04 DIAGNOSIS — M81.0 AGE-RELATED OSTEOPOROSIS WITHOUT CURRENT PATHOLOGICAL FRACTURE: Primary | ICD-10-CM

## 2021-03-04 PROCEDURE — 96372 THER/PROPH/DIAG INJ SC/IM: CPT

## 2021-03-04 NOTE — NURSING NOTE
Indication: Prolia  (denosumab) is a prescription medicine used to treat osteoporosis in patients who:     Are at high risk for fracture, meaning patients who have had a fracture related to osteoporosis, or who have multiple risk factors for fracture     Cannot use another osteoporosis medicine or other osteoporosis medicines did not work well   The timeline for early/late injections would be 4 weeks early and any time after the 6 month eloisa. If a patient receives their injection late, then the subsequent injection would be 6 months from the date that they actually received the injection    1.  When was the last injection?  8/27/2020  2.  Did they check with their insurance for this calendar year?  yes  3.  Is there an order in the chart?  yes  4.  Has the patient had dental work involving the bone in the past month or will have work in the next 6 months?  no  5.  Did you have the patient wait 15 minutes after the injection?  yes  6.  Remember to use .injection under the medication notes    The following steps were completed to comply with the REMS program for Prolia:    Reviewed information in the Medication Guide and Patient Counseling Chart, including the serious risks of Prolia  and the symptoms of each risk.    Advised patient to seek prompt medical attention if they have signs or symptoms of any of the serious risks.  Provided each patient a copy of the Medication Guide and Patient Brochure.    The following medication was given:     MEDICATION: Denosumab (Prolia) 60 mg/ml SOLN  ROUTE: SQ  SITE: Arm - Right  DOSE: 60mg/ml  LOT #: [id number]  :  Gutierrez  EXPIRATION DATE:  11/22  NDC#: 56273-604-66     Radha Merritt RN  [facility]

## 2021-03-04 NOTE — PATIENT INSTRUCTIONS
Patient presents to [facility] with complaints of right underarm boil.      no fever or pus  Past Medical History:   Diagnosis Date   • LSIL (low grade squamous intraepithelial lesion) on Pap smear 12/12/11     Blood pressure 134/82, pulse 83, temperature 98.1 °F (36.7 °C), temperature source Oral, resp. rate 16, weight 106.1 kg, last menstrual period 08/06/2018, SpO2 100 %.    General appearance: Healthy, alert, in no distress.   Skin: Skin color, texture, turgor are normal. There  Is a 1 cm tender swelling in the right axilla, fluctuant and warm   Eyes: Corneas clear and pupils equal, round, reactive to light and accommodation.   Nose: Clear rhinorrhea.   Ears: External ears normal. Canals clear. TM's (Tympanic membranes) normal.   Throat: Moderate erythema noted.   Neck: Supple, no lymphadenopathy.  Lungs: Clear to auscultation.   Cardiac: Regular rate and rhythm, no rubs, click, gallops or murmurs.    Procedure note: After cleaning area thoroughly, the area was incised with size 11 scalpel, pus drained, sent for culture  (L72.3,  L08.9) Infected sebaceous cyst  (primary encounter diagnosis)  Suspect MRSA     Plan: DRAIN SKIN ABSCESS SIMPLE  , cephALEXin (KEFLEX)         500 MG capsule, ibuprofen (MOTRIN) 800 MG         tablet, AEROBIC CULTURE AND SMEAR,      You received your Prolia injection today  Your next Injection is due in 6 months (around 9/4/2021)  If you plan on having any dental work done within the next 6 months, please let your dentist know that you are on this medication.  We will call in advance to have your next injection scheduled.   Make sure you do not have any dental work completed involving the jaw bone within 1 month prior to your scheduled injection

## 2021-03-04 NOTE — TELEPHONE ENCOUNTER
Postponing this encounter-    Patient due for Prolia injection around 9/4/2021    1. Please ask provider to put in a new CAM order for (Pre Auth) Prolia injection if one is not current and any lab orders if needed (it is up to the provider's discretion on how often labs are checked once Prolia injections have been started)    2. Please contact patient to schedule the injection with RN 2 weeks out    3. Ask MA to re-order med 1 week in advance    Make sure patient has not had any dental work including the jaw bone (i.e teeth extraction) 1 month prior to injection    For questions about the cost, patient may contact our CONSUMER PRICE LINE at [phone] for an estimate.     NO ABN IS NEEDED UNLESS COVERAGE CANNOT BE GUARENTEED (OFF LABEL USE, PA STILL IN PROCESS, INSURANCE PENDING, ETC)    CHECK STATUS PRIOR TO ADMINISTRATION- Chart Review > Referrals tab - Select the Referral to view the report    Radha Merritt RN  [facility]

## 2021-04-11 ENCOUNTER — HEALTH MAINTENANCE LETTER (OUTPATIENT)
Age: 86
End: 2021-04-11

## 2021-07-07 ENCOUNTER — TRANSFERRED RECORDS (OUTPATIENT)
Dept: HEALTH INFORMATION MANAGEMENT | Facility: CLINIC | Age: 86
End: 2021-07-07
Payer: MEDICARE

## 2021-08-03 DIAGNOSIS — I10 BENIGN ESSENTIAL HYPERTENSION: ICD-10-CM

## 2021-08-03 RX ORDER — METOPROLOL TARTRATE 25 MG/1
TABLET, FILM COATED ORAL
Qty: 180 TABLET | Refills: 0 | Status: SHIPPED | OUTPATIENT
Start: 2021-08-03 | End: 2021-11-09

## 2021-08-13 ENCOUNTER — TELEPHONE (OUTPATIENT)
Dept: INTERNAL MEDICINE | Facility: CLINIC | Age: 86
End: 2021-08-13

## 2021-08-13 DIAGNOSIS — N18.31 STAGE 3A CHRONIC KIDNEY DISEASE (H): ICD-10-CM

## 2021-08-13 DIAGNOSIS — M81.0 AGE-RELATED OSTEOPOROSIS WITHOUT CURRENT PATHOLOGICAL FRACTURE: Primary | ICD-10-CM

## 2021-08-17 NOTE — TELEPHONE ENCOUNTER
Routing to Dr. Haney to place new CAM order for prolia if needed, and to advise if any labs are needed prior to injection.  Mariel Laguna,

## 2021-08-18 NOTE — TELEPHONE ENCOUNTER
LM for patient to return call and schedule lab appointment and prolia injection.   Patient will need lab appointment and then prolia injection about a week after that.  Will also need to make sure patient has not had any dental work, extractions or jaw bone surgery within the past month.  Patient is due for the injection any time after September 4th. Mariel Laguna,

## 2021-09-13 ASSESSMENT — ACTIVITIES OF DAILY LIVING (ADL)
CURRENT_FUNCTION: MEDICATION ADMINISTRATION REQUIRES ASSISTANCE
CURRENT_FUNCTION: TELEPHONE REQUIRES ASSISTANCE
CURRENT_FUNCTION: HOUSEWORK REQUIRES ASSISTANCE
CURRENT_FUNCTION: PREPARING MEALS REQUIRES ASSISTANCE
CURRENT_FUNCTION: SHOPPING REQUIRES ASSISTANCE
CURRENT_FUNCTION: TRANSPORTATION REQUIRES ASSISTANCE
CURRENT_FUNCTION: MONEY MANAGEMENT REQUIRES ASSISTANCE

## 2021-09-13 ASSESSMENT — ENCOUNTER SYMPTOMS
HEMATOCHEZIA: 0
WEAKNESS: 0
NAUSEA: 0
EYE PAIN: 0
COUGH: 0
FEVER: 0
HEMATURIA: 0
MYALGIAS: 0
DIZZINESS: 0
HEADACHES: 0
PARESTHESIAS: 0
CHILLS: 0
ABDOMINAL PAIN: 0
DIARRHEA: 0
BREAST MASS: 0
ARTHRALGIAS: 0
SORE THROAT: 0
NERVOUS/ANXIOUS: 0
FREQUENCY: 1
JOINT SWELLING: 0
SHORTNESS OF BREATH: 0
CONSTIPATION: 0
PALPITATIONS: 0
DYSURIA: 0
HEARTBURN: 0

## 2021-09-14 NOTE — PROGRESS NOTES
Current Eye Medications:  Ocuflox TID left eye, Voltaren and Prednisolone TID right eye.      Subjective:  Here for PO1 left eye, no pain or problems.      Objective:  See Ophthalmology Exam.       Assessment: Doing well status/post Kelman phacoemulsification/ posterior chamber lens left eye.      Plan:   See Patient Instructions.        Yes

## 2021-09-20 ENCOUNTER — OFFICE VISIT (OUTPATIENT)
Dept: INTERNAL MEDICINE | Facility: CLINIC | Age: 86
End: 2021-09-20
Payer: MEDICARE

## 2021-09-20 DIAGNOSIS — H61.23 BILATERAL IMPACTED CERUMEN: ICD-10-CM

## 2021-09-20 DIAGNOSIS — R73.09 ELEVATED GLUCOSE: ICD-10-CM

## 2021-09-20 DIAGNOSIS — Z00.00 ENCOUNTER FOR MEDICARE ANNUAL WELLNESS EXAM: Primary | ICD-10-CM

## 2021-09-20 DIAGNOSIS — N18.31 STAGE 3A CHRONIC KIDNEY DISEASE (H): ICD-10-CM

## 2021-09-20 DIAGNOSIS — I10 ESSENTIAL HYPERTENSION WITH GOAL BLOOD PRESSURE LESS THAN 150/90: ICD-10-CM

## 2021-09-20 DIAGNOSIS — E78.5 HYPERLIPIDEMIA LDL GOAL <100: ICD-10-CM

## 2021-09-20 DIAGNOSIS — F03.90 DEMENTIA WITHOUT BEHAVIORAL DISTURBANCE, UNSPECIFIED DEMENTIA TYPE: ICD-10-CM

## 2021-09-20 LAB
ANION GAP SERPL CALCULATED.3IONS-SCNC: 4 MMOL/L (ref 3–14)
BASOPHILS # BLD AUTO: 0 10E3/UL (ref 0–0.2)
BASOPHILS NFR BLD AUTO: 0 %
BUN SERPL-MCNC: 7 MG/DL (ref 7–30)
CALCIUM SERPL-MCNC: 9.7 MG/DL (ref 8.5–10.1)
CHLORIDE BLD-SCNC: 99 MMOL/L (ref 94–109)
CHOLEST SERPL-MCNC: 149 MG/DL
CO2 SERPL-SCNC: 30 MMOL/L (ref 20–32)
CREAT SERPL-MCNC: 0.84 MG/DL (ref 0.52–1.04)
EOSINOPHIL # BLD AUTO: 0.1 10E3/UL (ref 0–0.7)
EOSINOPHIL NFR BLD AUTO: 2 %
ERYTHROCYTE [DISTWIDTH] IN BLOOD BY AUTOMATED COUNT: 12.9 % (ref 10–15)
FASTING STATUS PATIENT QL REPORTED: NORMAL
GFR SERPL CREATININE-BSD FRML MDRD: 64 ML/MIN/1.73M2
GLUCOSE BLD-MCNC: 102 MG/DL (ref 70–99)
HBA1C MFR BLD: 5.3 % (ref 0–5.6)
HCT VFR BLD AUTO: 41.2 % (ref 35–47)
HDLC SERPL-MCNC: 66 MG/DL
HGB BLD-MCNC: 14.1 G/DL (ref 11.7–15.7)
LDLC SERPL CALC-MCNC: 59 MG/DL
LYMPHOCYTES # BLD AUTO: 1.7 10E3/UL (ref 0.8–5.3)
LYMPHOCYTES NFR BLD AUTO: 25 %
MCH RBC QN AUTO: 31.3 PG (ref 26.5–33)
MCHC RBC AUTO-ENTMCNC: 34.2 G/DL (ref 31.5–36.5)
MCV RBC AUTO: 92 FL (ref 78–100)
MONOCYTES # BLD AUTO: 0.5 10E3/UL (ref 0–1.3)
MONOCYTES NFR BLD AUTO: 7 %
NEUTROPHILS # BLD AUTO: 4.5 10E3/UL (ref 1.6–8.3)
NEUTROPHILS NFR BLD AUTO: 66 %
NONHDLC SERPL-MCNC: 83 MG/DL
PLATELET # BLD AUTO: 229 10E3/UL (ref 150–450)
POTASSIUM BLD-SCNC: 5.6 MMOL/L (ref 3.4–5.3)
RBC # BLD AUTO: 4.5 10E6/UL (ref 3.8–5.2)
SODIUM SERPL-SCNC: 133 MMOL/L (ref 133–144)
TRIGL SERPL-MCNC: 121 MG/DL
WBC # BLD AUTO: 6.8 10E3/UL (ref 4–11)

## 2021-09-20 PROCEDURE — 85025 COMPLETE CBC W/AUTO DIFF WBC: CPT | Performed by: INTERNAL MEDICINE

## 2021-09-20 PROCEDURE — 80061 LIPID PANEL: CPT | Performed by: INTERNAL MEDICINE

## 2021-09-20 PROCEDURE — 80048 BASIC METABOLIC PNL TOTAL CA: CPT | Performed by: INTERNAL MEDICINE

## 2021-09-20 PROCEDURE — 83036 HEMOGLOBIN GLYCOSYLATED A1C: CPT | Performed by: INTERNAL MEDICINE

## 2021-09-20 PROCEDURE — G0439 PPPS, SUBSEQ VISIT: HCPCS | Performed by: INTERNAL MEDICINE

## 2021-09-20 PROCEDURE — 36415 COLL VENOUS BLD VENIPUNCTURE: CPT | Performed by: INTERNAL MEDICINE

## 2021-09-20 RX ORDER — SIMVASTATIN 40 MG
40 TABLET ORAL AT BEDTIME
Qty: 90 TABLET | Refills: 3 | Status: SHIPPED | OUTPATIENT
Start: 2021-09-20 | End: 2022-10-31

## 2021-09-20 ASSESSMENT — ENCOUNTER SYMPTOMS
WEAKNESS: 0
SORE THROAT: 0
NAUSEA: 0
FEVER: 0
HEARTBURN: 0
BREAST MASS: 0
HEMATOCHEZIA: 0
CONSTIPATION: 0
DIARRHEA: 0
JOINT SWELLING: 0
FREQUENCY: 1
COUGH: 0
ARTHRALGIAS: 0
EYE PAIN: 0
PARESTHESIAS: 0
SHORTNESS OF BREATH: 0
HEMATURIA: 0
MYALGIAS: 0
DYSURIA: 0
ABDOMINAL PAIN: 0
HEADACHES: 0
PALPITATIONS: 0
NERVOUS/ANXIOUS: 0
DIZZINESS: 0
CHILLS: 0

## 2021-09-20 ASSESSMENT — ACTIVITIES OF DAILY LIVING (ADL)
CURRENT_FUNCTION: SHOPPING REQUIRES ASSISTANCE
CURRENT_FUNCTION: TRANSPORTATION REQUIRES ASSISTANCE
CURRENT_FUNCTION: HOUSEWORK REQUIRES ASSISTANCE
CURRENT_FUNCTION: PREPARING MEALS REQUIRES ASSISTANCE
CURRENT_FUNCTION: MONEY MANAGEMENT REQUIRES ASSISTANCE
CURRENT_FUNCTION: TELEPHONE REQUIRES ASSISTANCE
CURRENT_FUNCTION: MEDICATION ADMINISTRATION REQUIRES ASSISTANCE

## 2021-09-20 NOTE — PATIENT INSTRUCTIONS
Patient Education   Personalized Prevention Plan  You are due for the preventive services outlined below.  Your care team is available to assist you in scheduling these services.  If you have already completed any of these items, please share that information with your care team to update in your medical record.  Health Maintenance Due   Topic Date Due     ANNUAL REVIEW OF HM ORDERS  Never done     COVID-19 Vaccine (1) Never done     Zoster (Shingles) Vaccine (1 of 2) Never done     FALL RISK ASSESSMENT  02/20/2021     Flu Vaccine (1) 09/01/2021     Eye Exam  09/23/2021     Your Health Risk Assessment indicates you feel you are not in good health    A healthy lifestyle helps keep the body fit and the mind alert. It helps protect you from disease, helps you fight disease, and helps prevent chronic disease (disease that doesn't go away) from getting worse. This is important as you get older and begin to notice twinges in muscles and joints and a decline in the strength and stamina you once took for granted. A healthy lifestyle includes good healthcare, good nutrition, weight control, recreation, and regular exercise. Avoid harmful substances and do what you can to keep safe. Another part of a healthy lifestyle is stay mentally active and socially involved.    Good healthcare     Have a wellness visit every year.     If you have new symptoms, let us know right away. Don't wait until the next checkup.     Take medicines exactly as prescribed and keep your medicines in a safe place. Tell us if your medicine causes problems.   Healthy diet and weight control     Eat 3 or 4 small, nutritious, low-fat, high-fiber meals a day. Include a variety of fruits, vegetables, and whole-grain foods.     Make sure you get enough calcium in your diet. Calcium, vitamin D, and exercise help prevent osteoporosis (bone thinning).     If you live alone, try eating with others when you can. That way you get a good meal and have company  while you eat it.     Try to keep a healthy weight. If you eat more calories than your body uses for energy, it will be stored as fat and you will gain weight.     Recreation   Recreation is not limited to sports and team events. It includes any activity that provides relaxation, interest, enjoyment, and exercise. Recreation provides an outlet for physical, mental, and social energy. It can give a sense of worth and achievement. It can help you stay healthy.    Mental Exercise and Social Involvement  Mental and emotional health is as important as physical health. Keep in touch with friends and family. Stay as active as possible. Continue to learn and challenge yourself.   Things you can do to stay mentally active are:    Learn something new, like a foreign language or musical instrument.     Play SCRABBLE or do crossword puzzles. If you cannot find people to play these games with you at home, you can play them with others on your computer through the Internet.     Join a games club--anything from card games to chess or checkers or lawn bowling.     Start a new hobby.     Go back to school.     Volunteer.     Read.   Keep up with world events.    Exercise for a Healthier Heart  You may wonder how you can improve the health of your heart. If you re thinking about exercise, you re on the right track. You don t need to become an athlete. But you do need a certain amount of brisk exercise to help strengthen your heart. If you have been diagnosed with a heart condition, your healthcare provider may advise exercise to help stabilize your condition. To help make exercise a habit, choose safe, fun activities.      Exercise with a friend. When activity is fun, you're more likely to stick with it.   Before you start  Check with your healthcare provider before starting an exercise program. This is especially important if you have not been active for a while. It's also important if you have a long-term (chronic) health problem  such as heart disease, diabetes, or obesity. Or if you are at high risk for having these problems.   Why exercise?  Exercising regularly offers many healthy rewards. It can help you do all of the following:     Improve your blood cholesterol level to help prevent further heart trouble    Lower your blood pressure to help prevent a stroke or heart attack    Control diabetes, or reduce your risk of getting this disease    Improve your heart and lung function    Reach and stay at a healthy weight    Make your muscles stronger so you can stay active    Prevent falls and fractures by slowing the loss of bone mass (osteoporosis)    Manage stress better    Reduce your blood pressure    Improve your sense of self and your body image  Exercise tips      Ease into your routine. Set small goals. Then build on them. If you are not sure what your activity level should be, talk with your healthcare provider first before starting an exercise routine.    Exercise on most days. Aim for a total of 150 minutes (2 hours and 30 minutes) or more of moderate-intensity aerobic activity each week. Or 75 minutes (1 hour and 15 minutes) or more of vigorous-intensity aerobic activity each week. Or try for a combination of both. Moderate activity means that you breathe heavier and your heart rate increases but you can still talk. Think about doing 40 minutes of moderate exercise, 3 to 4 times a week. For best results, activity should last for about 40 minutes to lower blood pressure and cholesterol. It's OK to work up to the 40-minute period over time. Examples of moderate-intensity activity are walking 1 mile in 15 minutes. Or doing 30 to 45 minutes of yard work.    Step up your daily activity level.  Along with your exercise program, try being more active the whole day. Walk instead of drive. Or park further away so that you take more steps each day. Do more household tasks or yard work. You may not be able to meet the advised mount of  physical activity. But doing some moderate- or vigorous-intensity aerobic activity can help reduce your risk for heart disease. Your healthcare provider can help you figure out what is best for you.    Choose 1 or more activities you enjoy.  Walking is one of the easiest things you can do. You can also try swimming, riding a bike, dancing, or taking an exercise class.    When to call your healthcare provider  Call your healthcare provider if you have any of these:     Chest pain or feel dizzy or lightheaded    Burning, tightness, pressure, or heaviness in your chest, neck, shoulders, back, or arms    Abnormal shortness of breath    More joint or muscle pain    A very fast or irregular heartbeat (palpitations)  [employer] last reviewed this educational content on 7/1/2019 2000-2021 [employer]. All rights reserved. This information is not intended as a substitute for professional medical care. Always follow your healthcare professional's instructions.          Understanding USDA MyPlate  The USDA has guidelines to help you make healthy food choices. These are called MyPlate. MyPlate shows the food groups that make up healthy meals using the image of a place setting. Before you eat, think about the healthiest choices for what to put on your plate or in your cup or bowl. To learn more about building a healthy plate, visit www.choosemyplate.gov.    The food groups    Fruits. Any fruit or 100% fruit juice counts as part of the Fruit Group. Fruits may be fresh, canned, frozen, or dried, and may be whole, cut-up, or pureed. Make 1/2 of your plate fruits and vegetables.    Vegetables. Any vegetable or 100% vegetable juice counts as a member of the Vegetable Group. Vegetables may be fresh, frozen, canned, or dried. They can be served raw or cooked and may be whole, cut-up, or mashed. Make 1/2 of your plate fruits and vegetables.    Grains. All foods made from grains are part of the Grains Group. These include  wheat, rice, oats, cornmeal, and barley. Grains are often used to make foods such as bread, pasta, oatmeal, cereal, tortillas, and grits. Grains should be no more than 1/4 of your plate. At least half of your grains should be whole grains.    Protein. This group includes meat, poultry, seafood, beans and peas, eggs, processed soy products (such as tofu), nuts (including nut butters), and seeds. Make protein choices no more than 1/4 of your plate. Meat and poultry choices should be lean or low fat.    Dairy. The Dairy Group includes all fluid milk products and foods made from milk that contain calcium, such as yogurt and cheese. (Foods that have little calcium, such as cream, butter, and cream cheese, are not part of this group.) Most dairy choices should be low-fat or fat-free.    Oils. Oils aren't a food group, but they do contain essential nutrients. However it's important to watch your intake of oils. These are fats that are liquid at room temperature. They include canola, corn, olive, soybean, vegetable, and sunflower oil. Foods that are mainly oil include mayonnaise, certain salad dressings, and soft margarines. You likely already get your daily oil allowance from the foods you eat.  Things to limit  Eating healthy also means limiting these things in your diet:       Salt (sodium). Many processed foods have a lot of sodium. To keep sodium intake down, eat fresh vegetables, meats, poultry, and seafood when possible. Purchase low-sodium, reduced-sodium, or no-salt-added food products at the store. And don't add salt to your meals at home. Instead, season them with herbs and spices such as dill, oregano, cumin, and paprika. Or try adding flavor with lemon or lime zest and juice.    Saturated fat. Saturated fats are most often found in animal products such as beef, pork, and chicken. They are often solid at room temperature, such as butter. To reduce your saturated fat intake, choose leaner cuts of meat and  poultry. And try healthier cooking methods such as grilling, broiling, roasting, or baking. For a simple lower-fat swap, use plain nonfat yogurt instead of mayonnaise when making potato salad or macaroni salad.    Added sugars. These are sugars added to foods. They are in foods such as ice cream, candy, soda, fruit drinks, sports drinks, energy drinks, cookies, pastries, jams, and syrups. Cut down on added sugars by sharing sweet treats with a family member or friend. You can also choose fruit for dessert, and drink water or other unsweetened beverages.     [employer] last reviewed this educational content on 6/1/2020 2000-2021 [employer]. All rights reserved. This information is not intended as a substitute for professional medical care. Always follow your healthcare professional's instructions.        Activities of Daily Living    Your Health Risk Assessment indicates you have difficulties with activities of daily living such as housework, bathing, preparing meals, taking medication, etc. Please make a follow up appointment for us to address this issue in more detail.    Urinary Incontinence, Female (Adult)   Urinary incontinence means loss of bladder control. This problem affects many women, especially as they get older. If you have incontinence, you may be embarrassed to ask for help. But know that this problem can be treated.   Types of Incontinence  There are different types of incontinence. Two of the main types are described here. You can have more than one type.     Stress incontinence. With this type, urine leaks when pressure (stress) is put on the bladder. This may happen when you cough, sneeze, or laugh. Stress incontinence most often occurs because the pelvic floor muscles that support the bladder and urethra are weak. This can happen after pregnancy and vaginal childbirth or a hysterectomy. It can also be due to excess body weight or hormone changes.    Urge incontinence (also called  overactive bladder). With this type, a sudden urge to urinate is felt often. This may happen even though there may not be much urine in the bladder. The need to urinate often during the night is common. Urge incontinence most often occurs because of bladder spasms. This may be due to bladder irritation or infection. Damage to bladder nerves or pelvic muscles, constipation, and certain medicines can also lead to urge incontinence.  Treatment depends on the cause. Further evaluation is needed to find the type you have. This will likely include an exam and certain tests. Based on the results, you and your healthcare provider can then plan treatment. Until a diagnosis is made, the home care tips below can help ease symptoms.   Home care    Do pelvic floor muscle exercises, if they are prescribed. The pelvic floor muscles help support the bladder and urethra. Many women find that their symptoms improve when doing special exercises that strengthen these muscles. To do the exercises, contract the muscles you would use to stop your stream of urine. But do this when you re not urinating. Hold for 10 seconds, then relax. Repeat 10 to 20 times in a row, at least 3 times a day. Your healthcare provider may give you other instructions for how to do the exercises and how often.    Keep a bladder diary. This helps track how often and how much you urinate over a set period of time. Bring this diary with you to your next visit with the provider. The information can help your provider learn more about your bladder problem.    Lose weight, if advised to by your provider. Extra weight puts pressure on the bladder. Your provider can help you create a weight-loss plan that s right for you. This may include exercising more and making certain diet changes.    Don't have foods and drinks that may irritate the bladder. These can include alcohol and caffeinated drinks.    Quit smoking. Smoking and other tobacco use can lead to a long-term  (chronic) cough that strains the pelvic floor muscles. Smoking may also damage the bladder and urethra. Talk with your provider about treatments or methods you can use to quit smoking.    If drinking large amounts of fluid makes you have symptoms, you may be advised to limit your fluid intake. You may also be advised to drink most of your fluids during the day and to limit fluids at night.    If you re worried about urine leakage or accidents, you may wear absorbent pads to catch urine. Change the pads often. This helps reduce discomfort. It may also reduce the risk of skin or bladder infections.    Follow-up care  Follow up with your healthcare provider, or as directed. It may take some to find the right treatment for your problem. But healthy lifestyle changes can be made right away. These include such things as exercising on a regular basis, eating a healthy diet, losing weight (if needed), and quitting smoking. Your treatment plan may include special therapies or medicines. Certain procedures or surgery may also be options. Talk about any questions you have with your provider.   When to seek medical advice  Call the healthcare provider right away if any of these occur:    Fever of 100.4 F (38 C) or higher, or as directed by your provider    Bladder pain or fullness    Belly swelling    Nausea or vomiting    Back pain    Weakness, dizziness, or fainting  [employer] last reviewed this educational content on 1/1/2020 2000-2021 [employer]. All rights reserved. This information is not intended as a substitute for professional medical care. Always follow your healthcare professional's instructions.        Your Health Risk Assessment indicates you feel you are not in good emotional health.    Recreation   Recreation is not limited to sports and team events. It includes any activity that provides relaxation, interest, enjoyment, and exercise. Recreation provides an outlet for physical, mental, and social  energy. It can give a sense of worth and achievement. It can help you stay healthy.    Mental Exercise and Social Involvement  Mental and emotional health is as important as physical health. Keep in touch with friends and family. Stay as active as possible. Continue to learn and challenge yourself.   Things you can do to stay mentally active are:    Learn something new, like a foreign language or musical instrument.     Play SCRABBLE or do crossword puzzles. If you cannot find people to play these games with you at home, you can play them with others on your computer through the Internet.     Join a games club--anything from card games to chess or checkers or lawn bowling.     Start a new hobby.     Go back to school.     Volunteer.     Read.   Keep up with world events.

## 2021-09-20 NOTE — PROGRESS NOTES
"SUBJECTIVE:   Raeann Abdalla is a 86 year old female who presents for Preventive Visit.      Patient has been advised of split billing requirements and indicates understanding: Yes   Are you in the first 12 months of your Medicare coverage?  No    Healthy Habits:     In general, how would you rate your overall health?  Fair    Frequency of exercise:  None    Do you usually eat at least 4 servings of fruit and vegetables a day, include whole grains    & fiber and avoid regularly eating high fat or \"junk\" foods?  No    Taking medications regularly:  Not Applicable    Medication side effects:  None    Ability to successfully perform activities of daily living:  Telephone requires assistance, transportation requires assistance, shopping requires assistance, preparing meals requires assistance, housework requires assistance, medication administration requires assistance and money management requires assistance    Home Safety:  Lack of grab bars in the bathroom and lighting is poor    Hearing Impairment:  No hearing concerns    In the past 6 months, have you been bothered by leaking of urine? Yes    In general, how would you rate your overall mental or emotional health?  Poor      PHQ-2 Total Score: 0    Additional concerns today:  No    Do you feel safe in your environment? Yes    Have you ever done Advance Care Planning? (For example, a Health Directive, POLST, or a discussion with a medical provider or your loved ones about your wishes): Yes, patient states has an Advance Care Planning document and will bring a copy to the clinic.    Fall risk  Fallen 2 or more times in the past year?: No  Any fall with injury in the past year?: No    Cognitive Screening Not appropriate due to known dementia    Do you have sleep apnea, excessive snoring or daytime drowsiness?: no    Reviewed and updated as needed this visit by clinical staff  Tobacco  Allergies  Meds   Med Hx  Surg Hx  Fam Hx  Soc Hx        Reviewed and updated as " needed this visit by Provider                Social History     Tobacco Use     Smoking status: Never Smoker     Smokeless tobacco: Never Used     Tobacco comment: Never Used Tobacco. 35 yrs of 2nd hand smoke.   Substance Use Topics     Alcohol use: Yes     Alcohol/week: 0.0 standard drinks     Comment: 1 beer daily       Alcohol Use 9/13/2021   Prescreen: >3 drinks/day or >7 drinks/week? No   Prescreen: >3 drinks/day or >7 drinks/week? -   No flowsheet data found.        Current providers sharing in care for this patient include:   Patient Care Team:  Max Haney MD as PCP - General (Internal Medicine)  Josie Askew MD (Internal Medicine)  Yani Georges MD as MD (Internal Medicine)  Ifeanyi Rosario MD as Assigned Surgical Provider  Luz Coughlin APRN CNP as Assigned PCP    The following health maintenance items are reviewed in Epic and correct as of today:  Health Maintenance Due   Topic Date Due     ANNUAL REVIEW OF HM ORDERS  Never done     COVID-19 Vaccine (1) Never done     ZOSTER IMMUNIZATION (1 of 2) Never done     FALL RISK ASSESSMENT  02/20/2021     INFLUENZA VACCINE (1) 09/01/2021     EYE EXAM  09/23/2021     Lab work is in process  Labs reviewed in EPIC  BP Readings from Last 3 Encounters:   08/04/20 126/78   06/24/20 122/60   02/20/20 (!) 142/68    Wt Readings from Last 3 Encounters:   08/04/20 69.7 kg (153 lb 9.6 oz)   06/24/20 71.6 kg (157 lb 12.8 oz)   02/20/20 75.3 kg (166 lb)                  Patient Active Problem List   Diagnosis     Hypertension goal BP (blood pressure) < 140/90     S/P AVR (aortic valve replacement)     S/P CABG (coronary artery bypass graft)     Short-term memory loss     S/P bilateral hip replacements     History of right-sided carotid endarterectomy     CKD (chronic kidney disease) stage 3, GFR 30-59 ml/min (H)     Hyperlipidemia LDL goal <100     Coronary artery disease involving autologous artery coronary bypass graft without angina pectoris      Posterior vitreous detachment, bilateral     Pseudophakia, ou     Hx of retinal hemorrhage     Age-related osteoporosis without current pathological fracture     Past Surgical History:   Procedure Laterality Date     ABDOMEN SURGERY  Feb. 2014    aortic valve replacement     AORTIC VALVE REPLACEMENT       BYPASS GRAFT ARTERY CORONARY      X 3     CATARACT IOL, RT/LT       COLONOSCOPY  2010 ?    polyps found for first time     endarectomy Right     carotid     ENT SURGERY       HEAD & NECK SURGERY      rt carotid artery neck plaque removal     JOINT REPLACEMENT, HIP RT/LT Bilateral      PHACOEMULSIFICATION WITH STANDARD INTRAOCULAR LENS IMPLANT  07/2017; 8/2017    right eye; left eye     THORACIC SURGERY  2014    aortic valve replacement, triple bypass     TONSILLECTOMY N/A      VASCULAR SURGERY  2013    rt carotid artery neck plaque removal       Social History     Tobacco Use     Smoking status: Never Smoker     Smokeless tobacco: Never Used     Tobacco comment: Never Used Tobacco. 35 yrs of 2nd hand smoke.   Substance Use Topics     Alcohol use: Yes     Alcohol/week: 0.0 standard drinks     Comment: 1 beer daily     Family History   Problem Relation Age of Onset     Other Cancer Sister         cervical     Macular Degeneration Mother 75     Other Cancer Mother         Ovarian     Coronary Artery Disease Father      Hypertension Father      Hyperlipidemia Father      Diabetes Father      Other Cancer Sister         cervical     Glaucoma No family hx of          Current Outpatient Medications   Medication Sig Dispense Refill     acetaminophen (TYLENOL) 500 MG tablet Take 500-1,000 mg by mouth every 6 hours as needed for mild pain       amoxicillin (AMOXIL) 500 MG capsule Take 4 capsules (2,000 mg) by mouth 1 hour prior to dental procedure. 4 capsule 1     aspirin 81 MG EC tablet Take 81 mg by mouth 2 times daily       carboxymethylcellulose (REFRESH PLUS) 0.5 % SOLN Place 1 drop into both eyes daily as needed for  dry eyes       lisinopril (ZESTRIL) 10 MG tablet Take 1 tablet (10 mg) by mouth daily       metoprolol tartrate (LOPRESSOR) 25 MG tablet Take 1 tablet by mouth twice daily 180 tablet 0     Multiple Vitamins-Minerals (MULTI VITAMIN/MINERALS PO) Take 1 tablet by mouth once Centrum Silver       simvastatin (ZOCOR) 40 MG tablet Take 1 tablet (40 mg) by mouth At Bedtime 90 tablet 3     denosumab (PROLIA) 60 MG/ML SOSY injection Inject 1 mL (60 mg) Subcutaneous once for 1 dose 1 mL 0     Allergies   Allergen Reactions     Sulfa Drugs      Recent Labs   Lab Test 11/03/20  0947 08/04/20  1316 02/20/20  1150 08/02/19  0840 12/14/18  0000 08/24/18  1005 07/23/15  1008 12/30/14  0000   LDL 29  --   --  47  --  62   < > 91   HDL 66  --   --  54  --  45*   < > 47   TRIG 133  --   --  132  --  151*   < > 246   ALT  --   --   --   --   --   --   --  14   CR 0.94 0.91   < > 1.00   < > 1.00   < > 1.12   GFRESTIMATED 56* 58*   < > 52*   < > 53*   < > 47   GFRESTBLACK 65 67   < > 61   < > 64   < > 54   POTASSIUM 4.8 4.4   < > 4.3   < > 5.2   < > 4.7    < > = values in this interval not displayed.   needs vaccinations, see complete physical exam discussion in the assessment and plan section      Mammogram Screening - Mammography discussed and declined  Pertinent mammograms are reviewed under the imaging tab.    Review of Systems   Constitutional: Negative for chills and fever.   HENT: Negative for congestion, ear pain, hearing loss and sore throat.    Eyes: Negative for pain and visual disturbance.   Respiratory: Negative for cough and shortness of breath.    Cardiovascular: Negative for chest pain, palpitations and peripheral edema.   Gastrointestinal: Negative for abdominal pain, constipation, diarrhea, heartburn, hematochezia and nausea.   Breasts:  Negative for tenderness, breast mass and discharge.   Genitourinary: Positive for frequency and urgency. Negative for dysuria, genital sores, hematuria, pelvic pain, vaginal bleeding and  "vaginal discharge.   Musculoskeletal: Negative for arthralgias, joint swelling and myalgias.   Skin: Negative for rash.   Neurological: Negative for dizziness, weakness, headaches and paresthesias.   Psychiatric/Behavioral: Negative for mood changes. The patient is not nervous/anxious.      Constitutional, HEENT, cardiovascular, pulmonary, gi and gu systems are negative, except as otherwise noted.    OBJECTIVE:   There were no vitals taken for this visit. Estimated body mass index is 27.65 kg/m  as calculated from the following:    Height as of 6/24/20: 1.588 m (5' 2.5\").    Weight as of 8/4/20: 69.7 kg (153 lb 9.6 oz).  Physical Exam  GENERAL: healthy, alert and no distress  EYES: Eyes grossly normal to inspection, PERRL and conjunctivae and sclerae normal  HENT: ear canals and TM's normal, nose and mouth without ulcers or lesions  NECK: no adenopathy, no asymmetry, masses, or scars and thyroid normal to palpation  RESP: lungs clear to auscultation - no rales, rhonchi or wheezes  BREAST: normal without masses, tenderness or nipple discharge and no palpable axillary masses or adenopathy  CV: regular rate and rhythm, normal S1 S2, no S3 or S4, no murmur, click or rub, no peripheral edema and peripheral pulses strong  ABDOMEN: soft, nontender, no hepatosplenomegaly, no masses and bowel sounds normal  MS: no gross musculoskeletal defects noted, no edema  SKIN: no suspicious lesions or rashes  NEURO: Normal strength and tone, mentation intact and speech normal  PSYCH: mentation appears normal, affect normal/bright    Diagnostic Test Results:  Labs reviewed in Epic  Orders Placed This Encounter   Procedures     REMOVE IMPACTED CERUMEN     Lipid panel reflex to direct LDL Fasting     Hemoglobin A1c     Basic metabolic panel  (Ca, Cl, CO2, Creat, Gluc, K, Na, BUN)     CBC with platelets and differential         ASSESSMENT / PLAN:   (Z00.00) Encounter for Medicare annual wellness exam  (primary encounter " "diagnosis)  Comment: routine screening issues   Plan: demented patient with supervision from  who is advancing with age and infirmity from multiple medical problems and 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 This is a sticky situation with the likelihood for eventual problems to Arise. This is a problem that warrants ongoing monitoring     (F03.90) Dementia without behavioral disturbance, unspecified dementia type (H)  Comment: as detailed above   Plan: CBC with platelets and differential            (N18.31) Stage 3a chronic kidney disease  Comment: as above, follow up as indicated on results   Plan: Basic metabolic panel  (Ca, Cl, CO2, Creat,         Gluc, K, Na, BUN), CBC with platelets and         differential            (I10) Essential hypertension with goal blood pressure less than 150/90  Comment: controlled within acceptable limits   Plan: CBC with platelets and differential            (E78.5) Hyperlipidemia LDL goal <100  Comment: due for recheck   Plan: simvastatin (ZOCOR) 40 MG tablet, Lipid panel         reflex to direct LDL Fasting            (R73.09) Elevated glucose  Comment: doubt clinical significance but warrants hemoglobin a1c  [ diabetes test ]   Plan: Hemoglobin A1c        Follow up as indicated on results     (H61.23) Bilateral impacted cerumen  Comment: successfully irrigated out by my medical assistant    Plan: REMOVE IMPACTED CERUMEN              Patient has been advised of split billing requirements and indicates understanding: Yes  COUNSELING:  Reviewed preventive health counseling, as reflected in patient instructions    Estimated body mass index is 27.65 kg/m  as calculated from the following:    Height as of 6/24/20: 1.588 m (5' 2.5\").    Weight as of 8/4/20: 69.7 kg (153 lb 9.6 oz).    Weight management plan: Discussed healthy diet and exercise guidelines    She reports that she has never smoked. She has never used smokeless tobacco.      Appropriate preventive services were " discussed with this patient, including applicable screening as appropriate for cardiovascular disease, diabetes, osteopenia/osteoporosis, and glaucoma.  As appropriate for age/gender, discussed screening for colorectal cancer, prostate cancer, breast cancer, and cervical cancer. Checklist reviewing preventive services available has been given to the patient.    Reviewed patients plan of care and provided an AVS. The Basic Care Plan (routine screening as documented in Health Maintenance) for Raeann meets the Care Plan requirement. This Care Plan has been established and reviewed with the Patient and spouse.    Counseling Resources:  ATP IV Guidelines  Pooled Cohorts Equation Calculator  Breast Cancer Risk Calculator  Breast Cancer: Medication to Reduce Risk  FRAX Risk Assessment  ICSI Preventive Guidelines  Dietary Guidelines for Americans, 2010  USDA's MyPlate  ASA Prophylaxis  Lung CA Screening    Max Haney MD  [facility]    Identified Health Risks:

## 2021-09-20 NOTE — PROGRESS NOTES
"    The patient was provided with suggestions to help her develop a healthy physical lifestyle.  She is at risk for lack of exercise and has been provided with information to increase physical activity for the benefit of her well-being.  The patient was counseled and encouraged to consider modifying their diet and eating habits. She was provided with information on recommended healthy diet options.  The patient reports that she has difficulty with activities of daily living. I have asked that the patient make a follow up appointment in 3-6 months  where this issue will be further evaluated and addressed.  Information on urinary incontinence and treatment options given to patient.  The patient was provided with suggestions to help her develop a healthy emotional lifestyle.  Answers for HPI/ROS submitted by the patient on 9/13/2021  In general, how would you rate your overall physical health?: fair  Frequency of exercise:: None  Do you usually eat at least 4 servings of fruit and vegetables a day, include whole grains & fiber, and avoid regularly eating high fat or \"junk\" foods? : No  Taking medications regularly:: Not Applicable  Medication side effects:: None  Activities of Daily Living: telephone requires assistance, transportation requires assistance, shopping requires assistance, preparing meals requires assistance, housework requires assistance, medication administration requires assistance, money management requires assistance  Home safety: lack of grab bars in the bathroom, lighting is poor  Hearing Impairment:: no hearing concerns  In the past 6 months, have you been bothered by leaking of urine?: Yes  abdominal pain: No  Blood in stool: No  Blood in urine: No  chest pain: No  chills: No  congestion: No  constipation: No  cough: No  diarrhea: No  dizziness: No  ear pain: No  eye pain: No  nervous/anxious: No  fever: No  frequency: Yes  genital sores: No  headaches: No  hearing loss: No  heartburn: " No  arthralgias: No  joint swelling: No  peripheral edema: No  mood changes: No  myalgias: No  nausea: No  dysuria: No  palpitations: No  Skin sensation changes: No  sore throat: No  urgency: Yes  rash: No  shortness of breath: No  visual disturbance: No  weakness: No  pelvic pain: No  vaginal bleeding: No  vaginal discharge: No  tenderness: No  breast mass: No  breast discharge: No  In general, how would you rate your overall mental or emotional health?: poor  Additional concerns today:: No

## 2021-09-21 ENCOUNTER — TELEPHONE (OUTPATIENT)
Dept: INTERNAL MEDICINE | Facility: CLINIC | Age: 86
End: 2021-09-21

## 2021-09-21 ENCOUNTER — MYC MEDICAL ADVICE (OUTPATIENT)
Dept: INTERNAL MEDICINE | Facility: CLINIC | Age: 86
End: 2021-09-21

## 2021-09-21 DIAGNOSIS — F03.90 DEMENTIA WITHOUT BEHAVIORAL DISTURBANCE, UNSPECIFIED DEMENTIA TYPE: Primary | ICD-10-CM

## 2021-09-21 DIAGNOSIS — E87.5 SERUM POTASSIUM ELEVATED: ICD-10-CM

## 2021-09-21 DIAGNOSIS — M81.0 AGE-RELATED OSTEOPOROSIS WITHOUT CURRENT PATHOLOGICAL FRACTURE: ICD-10-CM

## 2021-09-21 DIAGNOSIS — Z96.643 S/P BILATERAL HIP REPLACEMENTS: ICD-10-CM

## 2021-09-21 NOTE — TELEPHONE ENCOUNTER
As with most things, especially in the Practice of Medicine , there is some uncertainty in everything we do. A potassium of 5.6 is concerning because technically I don't know why the potassium is elevated. It never was before . Technically I do not know why it is elevated.     A potassium of 5.6 isn't overly dangerous but 6.0 is and I have learned that of all the electrolytes derangements, elevated potassium is the one we need to be more concerned about then the others as a high potassium, 6.0 or greater can be associated with cardiac arrhythmias and sudden death.    So while in reality as I said in my first message, while there is a chance this is a laboratory error I don't have the freedom to feel good about making any such assumptions. I have to assume this is real and due to something going on. If a recheck potassium confirm the presence of continued hyperkalemia then we would need to even possibly go to the hospital . In on the other hand this were to come back as normal we could forget about it. Waiting until 10/1 therefore is a second best plan.     I am sorry traveling is so difficult for patient. Perhaps a home health care is a good idea ? Besides this one need, for a blood draw, are there additional needs at home ? Should we order a home safety and needs assessment  ?    Otherwise if this isn't an option, I recommend patient come to laboratory for urgent potassium recheck     Max Haney MD

## 2021-09-21 NOTE — TELEPHONE ENCOUNTER
Max Haney MD   9/20/2021  9:07 PM CDT Back to Top        All of these tests are within acceptable limits except for the potassium which is elevated and this is of some concern. On the other hand this is possibly a laboratory error with something called hemolysis . But I don't know that, it's a hunch. I think this should just be rechecked as soon as possible, tomorrow or Wednesday just to play it safe with further follow up depending on the test results.     Max Haney MD

## 2021-09-21 NOTE — TELEPHONE ENCOUNTER
Left message on answering machine for patient to call back to the nurse at [phone].    Also sent results via [employer].    Radha Merritt RN  [facility]

## 2021-09-21 NOTE — TELEPHONE ENCOUNTER
Dr. Haney,  Please see SQMOS messages below and advise.    Radha Merritt RN  [facility]

## 2021-09-21 NOTE — TELEPHONE ENCOUNTER
Lamar Holder routed conversation to  Rn Triage Pool 2 hours ago (1:44 PM)     Day, Max Paez MD 2 hours ago (1:42 PM)           K max is 5.3. Is 5.6, .3 above the max. Medically significant enough that a retest can't be done during my 10-1-21 scheduled appointment?

## 2021-09-24 ENCOUNTER — TELEPHONE (OUTPATIENT)
Dept: FAMILY MEDICINE | Facility: CLINIC | Age: 86
End: 2021-09-24

## 2021-09-24 NOTE — TELEPHONE ENCOUNTER
Louisa called from [facility] needs verbal orders for delay start of care until 10/02/21.Can be reached at [phone] or the Intake line is [phone] neither are secure lines.  Edie Underwood,

## 2021-09-26 ENCOUNTER — HEALTH MAINTENANCE LETTER (OUTPATIENT)
Age: 86
End: 2021-09-26

## 2021-09-27 ENCOUNTER — MYC MEDICAL ADVICE (OUTPATIENT)
Dept: INTERNAL MEDICINE | Facility: CLINIC | Age: 86
End: 2021-09-27

## 2021-09-27 NOTE — TELEPHONE ENCOUNTER
Per 9/24/2021 phone encounter, HC is not starting until 10/2/2021.  Patient has Prolia injection and ear irrigation scheduled for 10/1/2021.  Is it ok to wait until 10/1/2021 (in clinic) or 10/2/2021 (with HC) for the blood draw? (currently, our lab does not have openings around the same time as her ancillary appointments on 10/1/2021)  Please place lab orders to recheck the elevated potassium    Kayleen Pierre RN  [facility]

## 2021-09-27 NOTE — TELEPHONE ENCOUNTER
Verbal given to home care to draw BMP at 10/2/21 HC appointment.     Mychart sent to patient.     Ewa Kinney RN

## 2021-10-01 ENCOUNTER — MYC MEDICAL ADVICE (OUTPATIENT)
Dept: INTERNAL MEDICINE | Facility: CLINIC | Age: 86
End: 2021-10-01

## 2021-10-01 ENCOUNTER — ALLIED HEALTH/NURSE VISIT (OUTPATIENT)
Dept: FAMILY MEDICINE | Facility: CLINIC | Age: 86
End: 2021-10-01
Payer: MEDICARE

## 2021-10-01 ENCOUNTER — ALLIED HEALTH/NURSE VISIT (OUTPATIENT)
Dept: NURSING | Facility: CLINIC | Age: 86
End: 2021-10-01
Payer: MEDICARE

## 2021-10-01 ENCOUNTER — TELEPHONE (OUTPATIENT)
Dept: FAMILY MEDICINE | Facility: CLINIC | Age: 86
End: 2021-10-01

## 2021-10-01 ENCOUNTER — MEDICAL CORRESPONDENCE (OUTPATIENT)
Dept: HEALTH INFORMATION MANAGEMENT | Facility: CLINIC | Age: 86
End: 2021-10-01

## 2021-10-01 DIAGNOSIS — H61.20 IMPACTED EAR WAX: Primary | ICD-10-CM

## 2021-10-01 DIAGNOSIS — E87.5 SERUM POTASSIUM ELEVATED: ICD-10-CM

## 2021-10-01 LAB
ANION GAP SERPL CALCULATED.3IONS-SCNC: 5 MMOL/L (ref 3–14)
BUN SERPL-MCNC: 6 MG/DL (ref 7–30)
CALCIUM SERPL-MCNC: 9.2 MG/DL (ref 8.5–10.1)
CHLORIDE BLD-SCNC: 95 MMOL/L (ref 94–109)
CO2 SERPL-SCNC: 30 MMOL/L (ref 20–32)
CREAT SERPL-MCNC: 0.85 MG/DL (ref 0.52–1.04)
GFR SERPL CREATININE-BSD FRML MDRD: 63 ML/MIN/1.73M2
GLUCOSE BLD-MCNC: 130 MG/DL (ref 70–99)
POTASSIUM BLD-SCNC: 5.2 MMOL/L (ref 3.4–5.3)
SODIUM SERPL-SCNC: 130 MMOL/L (ref 133–144)

## 2021-10-01 PROCEDURE — 80048 BASIC METABOLIC PNL TOTAL CA: CPT

## 2021-10-01 PROCEDURE — 20610 DRAIN/INJ JOINT/BURSA W/O US: CPT | Performed by: INTERNAL MEDICINE

## 2021-10-01 PROCEDURE — 99207 PR NO CHARGE NURSE ONLY: CPT

## 2021-10-01 PROCEDURE — 36415 COLL VENOUS BLD VENIPUNCTURE: CPT

## 2021-10-01 NOTE — TELEPHONE ENCOUNTER
Patient here for her Prolia injection.  Released BMP order to be drawn today.  Patient willing to wait until lab can draw her.     Called and LM on Sharon's VM with verbal orders below.  Routing to Dr. Haney to please route back if there were any other labs that were needed besides the BMP today    Kayleen Pierre RN  [facility]

## 2021-10-01 NOTE — PATIENT INSTRUCTIONS
You received your Prolia injection today  Your next Injection is due in 6 months (around 4/1/2022)  If you plan on having any dental work done within the next 6 months, please let your dentist know that you are on this medication.  We will call in advance to have your next injection scheduled.   Make sure you do not have any dental work completed involving the jaw bone within 1 month prior to your scheduled injection

## 2021-10-01 NOTE — PROGRESS NOTES
Patient identified using two patient identifiers.  Ear exam showing wax occlusion completed by provider.  Solution: warm water was placed in the right ear(s) via irrigation tool: elephant ear.    Brooke Chacon MA

## 2021-10-01 NOTE — TELEPHONE ENCOUNTER
Postponing this encounter-    Patient due for Prolia injection around 4/1/2022    1. Please ask provider to put in a new CAM order for (Pre Auth) Prolia injection if one is not current and any lab orders if needed (it is up to the provider's discretion on how often labs are checked once Prolia injections have been started)    2. Please contact patient to schedule the injection with RN 2 weeks out    3. Ask MA to re-order med 1 week in advance    Make sure patient has NOT had any dental work involving the jaw bone (i.e teeth extraction) 1 month prior to injection     For questions about the cost, patient may contact our CONSUMER PRICE LINE at [phone] for an estimate.     CHECK STATUS PRIOR TO ADMINISTRATION- Chart Review > Referrals tab - Select the Referral to view the report    Kayleen Pierre RN  [facility]

## 2021-10-01 NOTE — TELEPHONE ENCOUNTER
Routing to PCP ok for below orders?    [facility] (Sharon) calling for updated home care orders  [phone] to leave detailed message      Requesting orders:  SN 1 x wk for 9 wks and 3 PRNS  Spouse reports labs need to be done, do you want any of these labs done in the home during nurse visit?      FYI- Pt and spouse decline other home care disciplines at this time.        Luz Stephenson RN  [facility]

## 2021-10-01 NOTE — LETTER
October 4, 2021      Raeann Abdalla  [address] [place] [phone]        Dear ,    We are writing to inform you of your test results.    All of these tests are within acceptable limits , things look good !         Resulted Orders   Basic metabolic panel  (Ca, Cl, CO2, Creat, Gluc, K, Na, BUN)   Result Value Ref Range    Sodium 130 (L) 133 - 144 mmol/L    Potassium 5.2 3.4 - 5.3 mmol/L    Chloride 95 94 - 109 mmol/L    Carbon Dioxide (CO2) 30 20 - 32 mmol/L    Anion Gap 5 3 - 14 mmol/L    Urea Nitrogen 6 (L) 7 - 30 mg/dL    Creatinine 0.85 0.52 - 1.04 mg/dL    Calcium 9.2 8.5 - 10.1 mg/dL    Glucose 130 (H) 70 - 99 mg/dL    GFR Estimate 63 >60 mL/min/1.73m2      Comment:      As of July 11, 2021, eGFR is calculated by the CKD-EPI creatinine equation, without race adjustment. eGFR can be influenced by muscle mass, exercise, and diet. The reported eGFR is an estimation only and is only applicable if the renal function is stable.       If you have any questions or concerns, please call the clinic at the number listed above.       Sincerely,      Max Haney MD/jesu

## 2021-10-01 NOTE — PROGRESS NOTES
Clinic Administered Medication Documentation          Prolia Documentation    Prior to injection, verified patient identity using patient's name and date of birth. Medication was administered. Please see MAR and medication order for additional information. Patient instructed to remain in clinic for 15 minutes and report any adverse reaction to staff immediately .    Indication: Prolia  (denosumab) is a prescription medicine used to treat osteoporosis in patients who:     Are at high risk for fracture, meaning patients who have had a fracture related to osteoporosis, or who have multiple risk factors for fracture.    Cannot use another osteoporosis medicine or other osteoporosis medicines did not work well.    The timeline for early/late injections would be 4 weeks early and any time after the 6 month eloisa. If a patient receives their injection late, then the subsequent injection would be 6 months from the date that they actually received the injection.    When was the last injection?  3/4/2021  Was the last injection at least 6 months ago? Yes  Has the prior authorization been completed?  Yes  Is there an active order (within the past 365 days) in the chart?  Yes  Patient denied having dental work involving the bone in the past 6 months?  Yes  Patient denies a plan to dental work involving the bone in the next 6 months? No.  Do not administer.  Inform provider.          Kayleen Pierre RN  [facility]

## 2021-10-08 ENCOUNTER — TELEPHONE (OUTPATIENT)
Dept: INTERNAL MEDICINE | Facility: CLINIC | Age: 86
End: 2021-10-08

## 2021-10-08 NOTE — TELEPHONE ENCOUNTER
Reason for Call:  Form, our goal is to have forms completed with 72 hours, however, some forms may require a visit or additional information.    Type of letter, form or note:  Home Health Certification    Who is the form from?: Home care    Where did the form come from: form was faxed in    What clinic location was the form placed at?: [facility]    Where the form was placed: Given to physician    What number is listed as a contact on the form?: [phone]    Call taken on 10/8/2021 at 12:03 PM by Mariel Laguna

## 2021-10-11 DIAGNOSIS — Z53.9 DIAGNOSIS NOT YET DEFINED: Primary | ICD-10-CM

## 2021-10-11 PROCEDURE — G0180 MD CERTIFICATION HHA PATIENT: HCPCS | Performed by: INTERNAL MEDICINE

## 2021-10-13 ENCOUNTER — TRANSFERRED RECORDS (OUTPATIENT)
Dept: HEALTH INFORMATION MANAGEMENT | Facility: CLINIC | Age: 86
End: 2021-10-13
Payer: MEDICARE

## 2021-10-13 ENCOUNTER — DOCUMENTATION ONLY (OUTPATIENT)
Dept: CARE COORDINATION | Facility: CLINIC | Age: 86
End: 2021-10-13

## 2021-10-13 NOTE — PROGRESS NOTES
[facility] now requests orders and shares plan of care/discharge summaries for some patients through [facility].  Please REPLY TO THIS MESSAGE OR ROUTE BACK TO THE AUTHOR in order to give authorization for orders when needed.  This is considered a verbal order, you will still receive a faxed copy of orders for signature.  Thank you for your assistance in improving collaboration for our patients.    Dear Dr. Obinna Cary was admitted on 10/1 to home care services. She and her spouse have refused all visits since then and request to be discharged from home care services.  She is discharged from the agency as of today, 10/13/2021.    Thank you!    Marissa Sanchez, KELLIE  [facility]  [phone]

## 2021-10-14 ENCOUNTER — MYC MEDICAL ADVICE (OUTPATIENT)
Dept: INTERNAL MEDICINE | Facility: CLINIC | Age: 86
End: 2021-10-14

## 2021-10-15 LAB
ALT SERPL-CCNC: 11 IU/L (ref 8–45)
AST SERPL-CCNC: 23 IU/L (ref 2–40)
INR (EXTERNAL): 1.1

## 2021-10-16 LAB
CHOLESTEROL (EXTERNAL): 120 MG/DL (ref 100–199)
CREATININE (EXTERNAL): 0.71 MG/DL (ref 0.57–1.11)
GFR ESTIMATED (EXTERNAL): >60 ML/MIN/1.73M2
GFR ESTIMATED (IF AFRICAN AMERICAN) (EXTERNAL): >60 ML/MIN/1.73M2
GLUCOSE (EXTERNAL): 90 MG/DL (ref 65–100)
HBA1C MFR BLD: 5.8 %
HDLC SERPL-MCNC: 49 MG/DL
LDL CHOLESTEROL (EXTERNAL): 48 MG/DL
NON HDL CHOLESTEROL (EXTERNAL): 71 MG/DL
POTASSIUM (EXTERNAL): 4.2 MMOL/L (ref 3.5–5)
TRIGLYCERIDES (EXTERNAL): 116 MG/DL
TSH SERPL-ACNC: 2.06 UIU/ML (ref 0.35–4.94)

## 2021-10-22 ENCOUNTER — TELEPHONE (OUTPATIENT)
Dept: FAMILY MEDICINE | Facility: CLINIC | Age: 86
End: 2021-10-22

## 2021-10-22 ENCOUNTER — MEDICAL CORRESPONDENCE (OUTPATIENT)
Dept: HEALTH INFORMATION MANAGEMENT | Facility: CLINIC | Age: 86
End: 2021-10-22
Payer: MEDICARE

## 2021-10-22 NOTE — TELEPHONE ENCOUNTER
Nurse calling to request orders for delay in start of care. They will see patient tommorow. Had difficulty reaching her to set up appointment.    Verbal given for requested orders.     Ewa Kinney RN

## 2021-10-23 ENCOUNTER — MEDICAL CORRESPONDENCE (OUTPATIENT)
Dept: HEALTH INFORMATION MANAGEMENT | Facility: CLINIC | Age: 86
End: 2021-10-23
Payer: MEDICARE

## 2021-10-25 ENCOUNTER — TELEPHONE (OUTPATIENT)
Dept: FAMILY MEDICINE | Facility: CLINIC | Age: 86
End: 2021-10-25

## 2021-10-25 NOTE — TELEPHONE ENCOUNTER
KELLIE Grande with [facility] calling.  Patient was discharged from the hospital recently and Christiane did an admit to [facility] over the weekend.  Christiane is requesting the following verbal orders-    SN 1 time a wee week x 4  1 every other week x 6  2 PRN  For assessment, teaching caregiver management, UTI preventions  PT and OT to eval and treat    Verbal orders given    Kayleen Pierre RN  [facility]

## 2021-10-26 ENCOUNTER — TRANSCRIBE ORDERS (OUTPATIENT)
Dept: OTHER | Age: 86
End: 2021-10-26

## 2021-10-26 ENCOUNTER — TELEPHONE (OUTPATIENT)
Dept: FAMILY MEDICINE | Facility: CLINIC | Age: 86
End: 2021-10-26

## 2021-10-26 DIAGNOSIS — T84.029A: Primary | ICD-10-CM

## 2021-10-26 DIAGNOSIS — Z96.649: Primary | ICD-10-CM
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
## 2021-11-04 ENCOUNTER — TELEPHONE (OUTPATIENT)
Dept: INTERNAL MEDICINE | Facility: CLINIC | Age: 86
End: 2021-11-04

## 2021-11-04 DIAGNOSIS — Z53.9 DIAGNOSIS NOT YET DEFINED: Primary | ICD-10-CM

## 2021-11-04 PROCEDURE — G0180 MD CERTIFICATION HHA PATIENT: HCPCS | Performed by: INTERNAL MEDICINE

## 2021-11-04 NOTE — TELEPHONE ENCOUNTER
Reason for Call:  Form, our goal is to have forms completed with 72 hours, however, some forms may require a visit or additional information.    Type of letter, form or note:  Home Health Certification    Who is the form from?: Home care    Where did the form come from: form was faxed in    What clinic location was the form placed at?: [facility]    Where the form was placed: Given to physician    What number is listed as a contact on the form?: [phone]       Call taken on 11/4/2021 at 8:13 AM by Mariel Laguna

## 2021-11-06 ENCOUNTER — MYC REFILL (OUTPATIENT)
Dept: INTERNAL MEDICINE | Facility: CLINIC | Age: 86
End: 2021-11-06
Payer: MEDICARE

## 2021-11-06 DIAGNOSIS — I10 BENIGN ESSENTIAL HYPERTENSION: ICD-10-CM

## 2021-11-09 RX ORDER — METOPROLOL TARTRATE 25 MG/1
25 TABLET, FILM COATED ORAL 2 TIMES DAILY
Qty: 180 TABLET | Refills: 0 | Status: SHIPPED | OUTPATIENT
Start: 2021-11-09 | End: 2022-08-05

## 2021-11-09 RX ORDER — METOPROLOL TARTRATE 25 MG/1
TABLET, FILM COATED ORAL
Qty: 180 TABLET | Refills: 1 | Status: SHIPPED | OUTPATIENT
Start: 2021-11-09 | End: 2023-03-15

## 2021-11-09 NOTE — TELEPHONE ENCOUNTER
"Routing refill request to provider for review/approval because:  Patient was seen for annual visit on 9/20/21 but no vitals were entered. Last BP done 8/4/2020      Requested Prescriptions   Pending Prescriptions Disp Refills     metoprolol tartrate (LOPRESSOR) 25 MG tablet 180 tablet 0     Sig: Take 1 tablet (25 mg) by mouth 2 times daily       Beta-Blockers Protocol Failed - 11/8/2021  7:53 AM        Failed - Blood pressure under 140/90 in past 12 months     BP Readings from Last 3 Encounters:   08/04/20 126/78   06/24/20 122/60   02/20/20 (!) 142/68                 Passed - Patient is age 6 or older        Passed - Recent (12 mo) or future (30 days) visit within the authorizing provider's specialty     Patient has had an office visit with the authorizing provider or a provider within the authorizing providers department within the previous 12 mos or has a future within next 30 days. See \"Patient Info\" tab in inbasket, or \"Choose Columns\" in Meds & Orders section of the refill encounter.              Passed - Medication is active on med list           Alyson Chaudhry RN    "

## 2021-11-16 ENCOUNTER — MYC MEDICAL ADVICE (OUTPATIENT)
Dept: INTERNAL MEDICINE | Facility: CLINIC | Age: 86
End: 2021-11-16
Payer: MEDICARE

## 2022-01-01 NOTE — TELEPHONE ENCOUNTER
Message from Alivia:  Original authorizing provider: STAR Courtney CNP would like a refill of the following medications:  metoprolol tartrate (LOPRESSOR) 25 MG tablet [STAR Courtney CNP]    Preferred pharmacy: [facility] [address] [id number] [facility]    Comment:  qty 180  
Signed Prescriptions:                        Disp   Refills    metoprolol tartrate (LOPRESSOR) 25 MG tabl*180 ta*2        Sig: Take 1 tablet (25 mg) by mouth 2 times daily Office           visit needed for further refills.  Authorizing Provider: NIKKO JACOB  Ordering User: ELIZABETH BARRERA RN refilled medication per [facility] Refill Protocol.     Elizabeth Barrera RN    
Statement Selected

## 2022-01-21 DIAGNOSIS — E78.5 HYPERLIPIDEMIA LDL GOAL <100: ICD-10-CM

## 2022-02-16 ENCOUNTER — MYC MEDICAL ADVICE (OUTPATIENT)
Dept: INTERNAL MEDICINE | Facility: CLINIC | Age: 87
End: 2022-02-16
Payer: MEDICARE

## 2022-03-16 ENCOUNTER — TELEPHONE (OUTPATIENT)
Dept: FAMILY MEDICINE | Facility: CLINIC | Age: 87
End: 2022-03-16
Payer: MEDICARE

## 2022-03-16 DIAGNOSIS — T50.905D: ICD-10-CM

## 2022-03-16 DIAGNOSIS — M81.0 AGE-RELATED OSTEOPOROSIS WITHOUT CURRENT PATHOLOGICAL FRACTURE: Primary | ICD-10-CM

## 2022-03-16 DIAGNOSIS — N18.31 CHRONIC KIDNEY DISEASE, STAGE 3A (H): ICD-10-CM

## 2022-03-18 NOTE — TELEPHONE ENCOUNTER
Routing to Dr. Haney to place new CAM order for prolia if needed and advise if any labs are needed prior to injection.    Mariel Laguna,

## 2022-03-21 NOTE — TELEPHONE ENCOUNTER
Spoke to patient's spouse, Antoni.    He has an eye appointment on 5-26, so would like to schedule this on the same day.    Appointment scheduled for Thursday, May 26th at 3 p.m.  Patient has not had an dental work, extractions or jaw bone surgery within the past month.    Message sent to Kimberly MENA) to order medication.    Mariel Laguna,

## 2022-04-02 ENCOUNTER — MYC REFILL (OUTPATIENT)
Dept: INTERNAL MEDICINE | Facility: CLINIC | Age: 87
End: 2022-04-02
Payer: MEDICARE

## 2022-04-02 DIAGNOSIS — I10 HYPERTENSION GOAL BP (BLOOD PRESSURE) < 140/90: Primary | ICD-10-CM

## 2022-04-05 RX ORDER — LISINOPRIL 10 MG/1
10 TABLET ORAL DAILY
Qty: 90 TABLET | Refills: 1 | Status: SHIPPED | OUTPATIENT
Start: 2022-04-05 | End: 2022-12-12

## 2022-04-05 NOTE — TELEPHONE ENCOUNTER
"Routing refill request to provider for review/approval because:  BP last done over a year ago  Medication is historical       Requested Prescriptions   Pending Prescriptions Disp Refills     lisinopril (ZESTRIL) 10 MG tablet       Sig: Take 1 tablet (10 mg) by mouth daily       ACE Inhibitors (Including Combos) Protocol Failed - 4/2/2022  4:23 PM        Failed - Blood pressure under 140/90 in past 12 months     BP Readings from Last 3 Encounters:   08/04/20 126/78   06/24/20 122/60   02/20/20 (!) 142/68                 Passed - Recent (12 mo) or future (30 days) visit within the authorizing provider's specialty     Patient has had an office visit with the authorizing provider or a provider within the authorizing providers department within the previous 12 mos or has a future within next 30 days. See \"Patient Info\" tab in inbasket, or \"Choose Columns\" in Meds & Orders section of the refill encounter.              Passed - Medication is active on med list        Passed - Patient is age 18 or older        Passed - No active pregnancy on record        Passed - Normal serum creatinine on file in past 12 months     Recent Labs   Lab Test 10/01/21  1437   CR 0.85       Ok to refill medication if creatinine is low          Passed - Normal serum potassium on file in past 12 months     Recent Labs   Lab Test 10/16/21  0616 10/01/21  1437   POTASSIUM  --  5.2   85676 4.2  --              Passed - No positive pregnancy test within past 12 months           Alyson Chaudhry RN    "

## 2022-05-26 ENCOUNTER — ALLIED HEALTH/NURSE VISIT (OUTPATIENT)
Dept: FAMILY MEDICINE | Facility: CLINIC | Age: 87
End: 2022-05-26
Payer: MEDICARE

## 2022-05-26 DIAGNOSIS — M81.0 AGE-RELATED OSTEOPOROSIS WITHOUT CURRENT PATHOLOGICAL FRACTURE: Primary | ICD-10-CM

## 2022-05-26 PROCEDURE — 99207 PR NO CHARGE NURSE ONLY: CPT

## 2022-05-26 PROCEDURE — 96372 THER/PROPH/DIAG INJ SC/IM: CPT | Performed by: INTERNAL MEDICINE

## 2022-05-26 NOTE — PROGRESS NOTES
Clinic Administered Medication Documentation    Administrations This Visit     denosumab (PROLIA) injection 60 mg     Admin Date  05/26/2022 Action  Given Dose  60 mg Route  Subcutaneous Site  Left Arm Administered By  Juana Fabian RN    Ordering Provider: Max Haney MD    NDC: 61206-686-98    Lot#: [id number]    : AMGEN    Patient Supplied?: No                Prolia Documentation    Prior to injection, verified patient identity using patient's name and date of birth. Medication was administered. Please see MAR and medication order for additional information. Patient instructed to remain in clinic for 15 minutes and report any adverse reaction to staff immediately .    Indication: Prolia  (denosumab) is a prescription medicine used to treat osteoporosis in patients who:     Are at high risk for fracture, meaning patients who have had a fracture related to osteoporosis, or who have multiple risk factors for fracture.    Cannot use another osteoporosis medicine or other osteoporosis medicines did not work well.    The timeline for early/late injections would be 4 weeks early and any time after the 6 month eloisa. If a patient receives their injection late, then the subsequent injection would be 6 months from the date that they actually received the injection.    When was the last injection?  10/1/2021  Was the last injection at least 6 months ago? Yes  Has the prior authorization been completed?  Yes  Is there an active order (within the past 365 days) in the chart?  Yes  Patient denied having dental work involving the bone in the past 6 months?  Yes  Patient denies a plan to dental work involving the bone in the next 6 months? Yes    The following steps were completed to comply with the REMS program for Prolia:    Reviewed information in the Medication Guide and Patient Counseling Chart, including the serious risks of Prolia  and the symptoms of each risk.    Advised patient to seek prompt medical  attention if they have signs or symptoms of any of the serious risks.    Provided each patient a copy of the Medication Guide and Patient Brochure.      Was entire vial of medication used? Yes  Vial/Syringe: Syringe  Expiration Date:  07/2024  Was the medication not being billed by clinic? No    Prior to giving injection, chart reviewed and it was determined that labs are not necessary (per TE 03/16/2022):    Mariel Laguna    7:36 AM  Note  Routing to Dr. Haney to place new CAM order for prolia if needed and advise if any labs are needed prior to injection.     Mariel Laguna,           Max Haney MD    1:41 PM  Note  Message reviewed , orders signed      No laboratory studies necessary      HANNAH Obrien MDN KELLIE  [facility]

## 2022-05-26 NOTE — PATIENT INSTRUCTIONS
You received your Prolia injection today  Your next Injection is due in 6 months (around 11/26/2022)  If you plan on having any dental work done within the next 6 months, please let your dentist know that you are on this medication.  We will call in advance to have your next injection scheduled.   Make sure you do not have any dental work completed involving the jaw bone within 1 month prior to your scheduled injection

## 2022-06-11 ENCOUNTER — MYC MEDICAL ADVICE (OUTPATIENT)
Dept: INTERNAL MEDICINE | Facility: CLINIC | Age: 87
End: 2022-06-11
Payer: MEDICARE

## 2022-06-11 DIAGNOSIS — I25.810 CORONARY ARTERY DISEASE INVOLVING AUTOLOGOUS ARTERY CORONARY BYPASS GRAFT WITHOUT ANGINA PECTORIS: ICD-10-CM

## 2022-06-11 DIAGNOSIS — Z95.2 S/P AVR (AORTIC VALVE REPLACEMENT): Primary | ICD-10-CM

## 2022-06-11 DIAGNOSIS — Z95.1 S/P CABG (CORONARY ARTERY BYPASS GRAFT): ICD-10-CM

## 2022-06-16 ENCOUNTER — MYC MEDICAL ADVICE (OUTPATIENT)
Dept: INTERNAL MEDICINE | Facility: CLINIC | Age: 87
End: 2022-06-16
Payer: MEDICARE

## 2022-06-16 NOTE — TELEPHONE ENCOUNTER
Please put in referral so patient can make appointment.   Cardiology         Marissa CANSECO CMA ([facility])

## 2022-07-07 ENCOUNTER — OFFICE VISIT (OUTPATIENT)
Dept: CARDIOLOGY | Facility: CLINIC | Age: 87
End: 2022-07-07
Attending: INTERNAL MEDICINE
Payer: MEDICARE

## 2022-07-07 VITALS
BODY MASS INDEX: 24.84 KG/M2 | HEART RATE: 68 BPM | OXYGEN SATURATION: 95 % | DIASTOLIC BLOOD PRESSURE: 73 MMHG | SYSTOLIC BLOOD PRESSURE: 134 MMHG | WEIGHT: 138 LBS

## 2022-07-07 DIAGNOSIS — I25.810 CORONARY ARTERY DISEASE INVOLVING AUTOLOGOUS ARTERY CORONARY BYPASS GRAFT WITHOUT ANGINA PECTORIS: ICD-10-CM

## 2022-07-07 DIAGNOSIS — Z95.1 S/P CABG (CORONARY ARTERY BYPASS GRAFT): ICD-10-CM

## 2022-07-07 DIAGNOSIS — Z95.2 S/P AVR (AORTIC VALVE REPLACEMENT): ICD-10-CM

## 2022-07-07 PROCEDURE — 99204 OFFICE O/P NEW MOD 45 MIN: CPT | Performed by: INTERNAL MEDICINE

## 2022-07-07 NOTE — PROGRESS NOTES
SUBJECTIVE:  Raeann Abdalla is a 86 year old female who presents for establishing cardiac care.  Status post AVR/CABG x3.    Patient with significant dementia.  Also hypertension and hyperlipidemia on treatment.  Had right carotid artery endarterectomy in 2011.  Patient had severe aortic stenosis and preop coronary angiogram showed two-vessel disease.  Patient had bioprosthetic AVR and CABG x3 involving LIMA to LAD and saphenous vein graft to distal circumflex and left PDA on 2/14/2014.  Since then patient remained asymptomatic.  No symptoms difficult to assess due to dementia.  Patient is very active.    Patient Active Problem List    Diagnosis Date Noted     Dementia without behavioral disturbance, unspecified dementia type (H) 09/20/2021     Priority: Medium     Age-related osteoporosis without current pathological fracture 02/24/2021     Priority: Medium     Hx of retinal hemorrhage 10/20/2018     Priority: Medium     Pseudophakia, ou 07/25/2017     Priority: Medium     Posterior vitreous detachment, bilateral 06/22/2016     Priority: Medium     Coronary artery disease involving autologous artery coronary bypass graft without angina pectoris 12/31/2015     Priority: Medium     Hypertension goal BP (blood pressure) < 140/90 02/03/2015     Priority: Medium     S/P AVR (aortic valve replacement) 02/03/2015     Priority: Medium     S/P CABG (coronary artery bypass graft) 02/03/2015     Priority: Medium     Follows with Dr. Deepali [facility] Cardiology       Short-term memory loss 02/03/2015     Priority: Medium     S/P bilateral hip replacements 02/03/2015     Priority: Medium     History of right-sided carotid endarterectomy 02/03/2015     Priority: Medium     Do you wish to do the replacement in the background? yes         CKD (chronic kidney disease) stage 3, GFR 30-59 ml/min (H) 02/03/2015     Priority: Medium     Hyperlipidemia LDL goal <100 02/03/2015     Priority: Medium    .  Current Outpatient Medications    Medication Sig     acetaminophen (TYLENOL) 500 MG tablet Take 500-1,000 mg by mouth every 6 hours as needed for mild pain     amoxicillin (AMOXIL) 500 MG capsule Take 4 capsules (2,000 mg) by mouth 1 hour prior to dental procedure.     aspirin 81 MG EC tablet Take 81 mg by mouth 2 times daily     carboxymethylcellulose (REFRESH PLUS) 0.5 % SOLN Place 1 drop into both eyes daily as needed for dry eyes     lisinopril (ZESTRIL) 10 MG tablet Take 1 tablet (10 mg) by mouth daily     metoprolol tartrate (LOPRESSOR) 25 MG tablet Take 1 tablet (25 mg) by mouth 2 times daily     Multiple Vitamins-Minerals (MULTI VITAMIN/MINERALS PO) Take 1 tablet by mouth once Centrum Silver     simvastatin (ZOCOR) 40 MG tablet Take 1 tablet (40 mg) by mouth At Bedtime     denosumab (PROLIA) 60 MG/ML SOSY injection Inject 1 mL (60 mg) Subcutaneous once for 1 dose     metoprolol tartrate (LOPRESSOR) 25 MG tablet Take 1 tablet by mouth twice daily     Current Facility-Administered Medications   Medication     denosumab (PROLIA) injection 60 mg     denosumab (PROLIA) injection 60 mg     Past Medical History:   Diagnosis Date     Arthritis      Heart disease years ago    aortic stenosis     History of blood transfusion 2014    during aortic valve replacement     HTN (hypertension)      Hyperlipemia      Nonsenile cataract      Past Surgical History:   Procedure Laterality Date     ABDOMEN SURGERY  Feb. 2014    aortic valve replacement     AORTIC VALVE REPLACEMENT       BYPASS GRAFT ARTERY CORONARY      X 3     CATARACT IOL, RT/LT       COLONOSCOPY  2010 ?    polyps found for first time     endarectomy Right     carotid     ENT SURGERY       HEAD & NECK SURGERY      rt carotid artery neck plaque removal     JOINT REPLACEMENT, HIP RT/LT Bilateral      PHACOEMULSIFICATION WITH STANDARD INTRAOCULAR LENS IMPLANT  07/2017; 8/2017    right eye; left eye     THORACIC SURGERY  2014    aortic valve replacement, triple bypass     TONSILLECTOMY N/A       VASCULAR SURGERY  2013    rt carotid artery neck plaque removal     Allergies   Allergen Reactions     Sulfa Drugs      Social History     Socioeconomic History     Marital status:      Spouse name: Not on file     Number of children: Not on file     Years of education: Not on file     Highest education level: Not on file   Occupational History     Not on file   Tobacco Use     Smoking status: Never Smoker     Smokeless tobacco: Never Used     Tobacco comment: Never Used Tobacco. 35 yrs of 2nd hand smoke.   Substance and Sexual Activity     Alcohol use: Yes     Alcohol/week: 0.0 standard drinks     Comment: 1 beer daily     Drug use: Never     Sexual activity: Never     Partners: Male     Birth control/protection: Abstinence   Other Topics Concern     Parent/sibling w/ CABG, MI or angioplasty before 65F 55M? No   Social History Narrative     Not on file     Social Determinants of Health     Financial Resource Strain: Not on file   Food Insecurity: Not on file   Transportation Needs: Not on file   Physical Activity: Not on file   Stress: Not on file   Social Connections: Not on file   Intimate Partner Violence: Not on file   Housing Stability: Not on file     Family History   Problem Relation Age of Onset     Other Cancer Sister         cervical     Macular Degeneration Mother 75     Other Cancer Mother         Ovarian     Coronary Artery Disease Father      Hypertension Father      Hyperlipidemia Father      Diabetes Father      Other Cancer Sister         cervical     Glaucoma No family hx of           REVIEW OF SYSTEMS:  Difficult and unreliable  due to significant dementia.       OBJECTIVE:  Blood pressure (!) 154/75, pulse 68, weight 62.6 kg (138 lb), SpO2 95 %, not currently breastfeeding.  General Appearance: healthy, alert, active and no distress  Head: Normocephalic. No masses, lesions, tenderness or abnormalities  Eyes: conjuctiva clear, PERRL, EOM intact  Ears: External ears normal. Canals clear.  TM's normal.  Nose: Nares normal  Mouth: normal  Neck: Supple, no cervical adenopathy, no thyromegaly  Lungs: clear to auscultation  Cardiac: regular rate and rhythm, normal S1 and S2, ESM:       ASSESSMENT/PLAN:  Patient here for establishing cardiac care.  Had CABG x3 involving LIMA to LAD and saphenous vein graft to distal circumflex and left PDA and bioprosthetic AVR on 2/14/2014.  Prior history of right carotid endarterectomy in 2011.  Patient also has hypertension and hyperlipidemia.  Due to significant dementia difficult to assess symptoms though patient is active and had no cardiac complaints.  Overall patient is doing well.  Records from [facility] reviewed.  Immediate postoperative echocardiogram showed normal biventricular function and normal prosthetic function with no significant AI mean gradient was 10 mmHg.  Repeat echocardiogram in 2020 showed normal biventricular function.  Aortic valve mean gradient of 11 mmHg.  Overall stable.  No regional wall motion abnormalities.  As it is 2 years since last evaluation we will plan for a repeat echocardiogram.  Patient will be contacted with the test result.  Return to Clinic 2 years with an echo.  Total visit duration 45 minutes.  This included face-to-face interview, physical exam, chart review, review of Care Everywhere with records of echocardiograms EKG coronary angiogram result and documentation.

## 2022-07-07 NOTE — LETTER
7/7/2022      RE: Raeann Abdalla  [address] [place] [id number]       Dear Colleague,    Thank you for the opportunity to participate in the care of your patient, Raeann Abdalla, at the [facility]. Please see a copy of my visit note below.       SUBJECTIVE:  Raeann Abdalla is a 86 year old female who presents for establishing cardiac care.  Status post AVR/CABG x3.    Patient with significant dementia.  Also hypertension and hyperlipidemia on treatment.  Had right carotid artery endarterectomy in 2011.  Patient had severe aortic stenosis and preop coronary angiogram showed two-vessel disease.  Patient had bioprosthetic AVR and CABG x3 involving LIMA to LAD and saphenous vein graft to distal circumflex and left PDA on 2/14/2014.  Since then patient remained asymptomatic.  No symptoms difficult to assess due to dementia.  Patient is very active.    Patient Active Problem List    Diagnosis Date Noted     Dementia without behavioral disturbance, unspecified dementia type (H) 09/20/2021     Priority: Medium     Age-related osteoporosis without current pathological fracture 02/24/2021     Priority: Medium     Hx of retinal hemorrhage 10/20/2018     Priority: Medium     Pseudophakia, ou 07/25/2017     Priority: Medium     Posterior vitreous detachment, bilateral 06/22/2016     Priority: Medium     Coronary artery disease involving autologous artery coronary bypass graft without angina pectoris 12/31/2015     Priority: Medium     Hypertension goal BP (blood pressure) < 140/90 02/03/2015     Priority: Medium     S/P AVR (aortic valve replacement) 02/03/2015     Priority: Medium     S/P CABG (coronary artery bypass graft) 02/03/2015     Priority: Medium     Follows with Dr. Deepali Peters Card[facility]       Short-term memory loss 02/03/2015     Priority: Medium     S/P bilateral hip replacements 02/03/2015     Priority: Medium     History  of right-sided carotid endarterectomy 02/03/2015     Priority: Medium     Do you wish to do the replacement in the background? yes         CKD (chronic kidney disease) stage 3, GFR 30-59 ml/min (H) 02/03/2015     Priority: Medium     Hyperlipidemia LDL goal <100 02/03/2015     Priority: Medium    .  Current Outpatient Medications   Medication Sig     acetaminophen (TYLENOL) 500 MG tablet Take 500-1,000 mg by mouth every 6 hours as needed for mild pain     amoxicillin (AMOXIL) 500 MG capsule Take 4 capsules (2,000 mg) by mouth 1 hour prior to dental procedure.     aspirin 81 MG EC tablet Take 81 mg by mouth 2 times daily     carboxymethylcellulose (REFRESH PLUS) 0.5 % SOLN Place 1 drop into both eyes daily as needed for dry eyes     lisinopril (ZESTRIL) 10 MG tablet Take 1 tablet (10 mg) by mouth daily     metoprolol tartrate (LOPRESSOR) 25 MG tablet Take 1 tablet (25 mg) by mouth 2 times daily     Multiple Vitamins-Minerals (MULTI VITAMIN/MINERALS PO) Take 1 tablet by mouth once Centrum Silver     simvastatin (ZOCOR) 40 MG tablet Take 1 tablet (40 mg) by mouth At Bedtime     denosumab (PROLIA) 60 MG/ML SOSY injection Inject 1 mL (60 mg) Subcutaneous once for 1 dose     metoprolol tartrate (LOPRESSOR) 25 MG tablet Take 1 tablet by mouth twice daily     Current Facility-Administered Medications   Medication     denosumab (PROLIA) injection 60 mg     denosumab (PROLIA) injection 60 mg     Past Medical History:   Diagnosis Date     Arthritis      Heart disease years ago    aortic stenosis     History of blood transfusion 2014    during aortic valve replacement     HTN (hypertension)      Hyperlipemia      Nonsenile cataract      Past Surgical History:   Procedure Laterality Date     ABDOMEN SURGERY  Feb. 2014    aortic valve replacement     AORTIC VALVE REPLACEMENT       BYPASS GRAFT ARTERY CORONARY      X 3     CATARACT IOL, RT/LT       COLONOSCOPY  2010 ?    polyps found for first time     endarectomy Right      carotid     ENT SURGERY       HEAD & NECK SURGERY      rt carotid artery neck plaque removal     JOINT REPLACEMENT, HIP RT/LT Bilateral      PHACOEMULSIFICATION WITH STANDARD INTRAOCULAR LENS IMPLANT  07/2017; 8/2017    right eye; left eye     THORACIC SURGERY  2014    aortic valve replacement, triple bypass     TONSILLECTOMY N/A      VASCULAR SURGERY  2013    rt carotid artery neck plaque removal     Allergies   Allergen Reactions     Sulfa Drugs      Social History     Socioeconomic History     Marital status:      Spouse name: Not on file     Number of children: Not on file     Years of education: Not on file     Highest education level: Not on file   Occupational History     Not on file   Tobacco Use     Smoking status: Never Smoker     Smokeless tobacco: Never Used     Tobacco comment: Never Used Tobacco. 35 yrs of 2nd hand smoke.   Substance and Sexual Activity     Alcohol use: Yes     Alcohol/week: 0.0 standard drinks     Comment: 1 beer daily     Drug use: Never     Sexual activity: Never     Partners: Male     Birth control/protection: Abstinence   Other Topics Concern     Parent/sibling w/ CABG, MI or angioplasty before 65F 55M? No   Social History Narrative     Not on file     Social Determinants of Health     Financial Resource Strain: Not on file   Food Insecurity: Not on file   Transportation Needs: Not on file   Physical Activity: Not on file   Stress: Not on file   Social Connections: Not on file   Intimate Partner Violence: Not on file   Housing Stability: Not on file     Family History   Problem Relation Age of Onset     Other Cancer Sister         cervical     Macular Degeneration Mother 75     Other Cancer Mother         Ovarian     Coronary Artery Disease Father      Hypertension Father      Hyperlipidemia Father      Diabetes Father      Other Cancer Sister         cervical     Glaucoma No family hx of           REVIEW OF SYSTEMS:  Difficult and unreliable  due to significant  dementia.       OBJECTIVE:  Blood pressure (!) 154/75, pulse 68, weight 62.6 kg (138 lb), SpO2 95 %, not currently breastfeeding.  General Appearance: healthy, alert, active and no distress  Head: Normocephalic. No masses, lesions, tenderness or abnormalities  Eyes: conjuctiva clear, PERRL, EOM intact  Ears: External ears normal. Canals clear. TM's normal.  Nose: Nares normal  Mouth: normal  Neck: Supple, no cervical adenopathy, no thyromegaly  Lungs: clear to auscultation  Cardiac: regular rate and rhythm, normal S1 and S2, ESM:       ASSESSMENT/PLAN:  Patient here for establishing cardiac care.  Had CABG x3 involving LIMA to LAD and saphenous vein graft to distal circumflex and left PDA and bioprosthetic AVR on 2/14/2014.  Prior history of right carotid endarterectomy in 2011.  Patient also has hypertension and hyperlipidemia.  Due to significant dementia difficult to assess symptoms though patient is active and had no cardiac complaints.  Overall patient is doing well.  Records from [facility] reviewed.  Immediate postoperative echocardiogram showed normal biventricular function and normal prosthetic function with no significant AI mean gradient was 10 mmHg.  Repeat echocardiogram in 2020 showed normal biventricular function.  Aortic valve mean gradient of 11 mmHg.  Overall stable.  No regional wall motion abnormalities.  As it is 2 years since last evaluation we will plan for a repeat echocardiogram.  Patient will be contacted with the test result.  Return to Clinic 2 years with an echo.  Total visit duration 45 minutes.  This included face-to-face interview, physical exam, chart review, review of Care Everywhere with records of echocardiograms EKG coronary angiogram result and documentation.      Please do not hesitate to contact me if you have any questions/concerns.     Sincerely,     TANYA Fajardo MD

## 2022-07-07 NOTE — PATIENT INSTRUCTIONS
Thank you for coming to the [facility] @ [facility]; please note the following instructions:    1. Echocardiogram now    2.  Dr. Perez recommends to follow up in 2 year with an echo prior.  The cardiology team will contact you to schedule when the time gets closer.          If you have any questions regarding your visit please contact your care team:     Cardiology  Telephone Number   Micaela WRIGHT., RN  Jovana COE, RN   Yumiko CANSECO, RMA  Leanne MATHUR, RMA  Maria Teresa Castillo., [facility]  Elizabeth YARBROUGH, Visit Facilitator   [phone] (option [phone])   For scheduling appts:     [phone] (select option 1)       For the Device Clinic (Pacemakers and ICD's)  RN's :  Erika Perkins   During business hours: [phone]    *After business hours:  [phone] (select option 4)      Normal test result notifications will be released via [employer] or mailed within 7 business days.  All other test results, will be communicated via telephone once reviewed by your cardiologist.    If you need a medication refill please contact your pharmacy.  Please allow 3 business days for your refill to be completed.    As always, thank you for trusting us with your health care needs!

## 2022-07-07 NOTE — NURSING NOTE
"Chief Complaint   Patient presents with     Coronary Artery Disease     New Dr. Perez. CAD, S/P CABG. AVR.        Initial BP (!) 154/75 (BP Location: Left arm, Patient Position: Chair, Cuff Size: Adult Regular)   Pulse 68   Wt 62.6 kg (138 lb)   SpO2 95%   BMI 24.84 kg/m   Estimated body mass index is 24.84 kg/m  as calculated from the following:    Height as of 6/24/20: 1.588 m (5' 2.5\").    Weight as of this encounter: 62.6 kg (138 lb)..  BP completed using cuff size: regular    AKOSUA Villarreal  "

## 2022-08-05 ENCOUNTER — MYC REFILL (OUTPATIENT)
Dept: INTERNAL MEDICINE | Facility: CLINIC | Age: 87
End: 2022-08-05

## 2022-08-05 DIAGNOSIS — I10 BENIGN ESSENTIAL HYPERTENSION: ICD-10-CM

## 2022-08-05 RX ORDER — METOPROLOL TARTRATE 25 MG/1
25 TABLET, FILM COATED ORAL 2 TIMES DAILY
Qty: 180 TABLET | Refills: 0 | Status: SHIPPED | OUTPATIENT
Start: 2022-08-05 | End: 2022-11-07

## 2022-08-05 NOTE — TELEPHONE ENCOUNTER
"Requested Prescriptions   Signed Prescriptions Disp Refills    metoprolol tartrate (LOPRESSOR) 25 MG tablet 180 tablet 0     Sig: Take 1 tablet (25 mg) by mouth 2 times daily       Beta-Blockers Protocol Passed - 8/5/2022  3:52 PM        Passed - Blood pressure under 140/90 in past 12 months     BP Readings from Last 3 Encounters:   07/07/22 134/73   08/04/20 126/78   06/24/20 122/60                 Passed - Patient is age 6 or older        Passed - Recent (12 mo) or future (30 days) visit within the authorizing provider's specialty     Patient has had an office visit with the authorizing provider or a provider within the authorizing providers department within the previous 12 mos or has a future within next 30 days. See \"Patient Info\" tab in inbasket, or \"Choose Columns\" in Meds & Orders section of the refill encounter.              Passed - Medication is active on med list           Betina Nascimento RN  [facility]     "

## 2022-10-29 DIAGNOSIS — E78.5 HYPERLIPIDEMIA LDL GOAL <100: ICD-10-CM

## 2022-10-31 RX ORDER — SIMVASTATIN 40 MG
TABLET ORAL
Qty: 90 TABLET | Refills: 0 | Status: SHIPPED | OUTPATIENT
Start: 2022-10-31 | End: 2023-01-27

## 2022-12-10 DIAGNOSIS — I10 HYPERTENSION GOAL BP (BLOOD PRESSURE) < 140/90: ICD-10-CM

## 2022-12-12 RX ORDER — LISINOPRIL 10 MG/1
10 TABLET ORAL DAILY
Qty: 30 TABLET | Refills: 0 | Status: SHIPPED | OUTPATIENT
Start: 2022-12-12 | End: 2022-12-12

## 2022-12-12 RX ORDER — LISINOPRIL 10 MG/1
10 TABLET ORAL DAILY
Qty: 90 TABLET | Refills: 0 | Status: SHIPPED | OUTPATIENT
Start: 2022-12-12 | End: 2023-03-11

## 2022-12-12 NOTE — TELEPHONE ENCOUNTER
Please see mychart encounter from 12/10/22. Pt is requesting 90 day supply. Thanks!    Radha Merritt RN  [facility]

## 2023-01-08 ENCOUNTER — HEALTH MAINTENANCE LETTER (OUTPATIENT)
Age: 88
End: 2023-01-08

## 2023-01-27 ENCOUNTER — MYC REFILL (OUTPATIENT)
Dept: INTERNAL MEDICINE | Facility: CLINIC | Age: 88
End: 2023-01-27
Payer: MEDICARE

## 2023-01-27 DIAGNOSIS — E78.5 HYPERLIPIDEMIA LDL GOAL <100: ICD-10-CM

## 2023-01-28 RX ORDER — SIMVASTATIN 40 MG
40 TABLET ORAL AT BEDTIME
Qty: 60 TABLET | Refills: 0 | Status: SHIPPED | OUTPATIENT
Start: 2023-01-28 | End: 2023-04-05

## 2023-01-28 NOTE — TELEPHONE ENCOUNTER
I perfectly well understand this patient has a dementia and has great difficulty with transportation, etc. However in order to be able to continue providing this prescription patient needs appointment. The last time she saw me for appointment was 9/2021 and so appointment is required. And since it is necessary for laboratory studies a face to face encounter is necessary .    See prescription     Max Haney MD      Lab Results   Component Value Date    CHOL 149 09/20/2021    CHOL 122 11/03/2020     Lab Results   Component Value Date    HDL 66 09/20/2021    HDL 66 11/03/2020     Lab Results   Component Value Date    LDL 59 09/20/2021    LDL 29 11/03/2020     Lab Results   Component Value Date    TRIG 116 10/16/2021    TRIG 133 11/03/2020     Lab Results   Component Value Date    CHOLHDLRATIO 3.3 07/23/2015

## 2023-02-04 ENCOUNTER — MYC REFILL (OUTPATIENT)
Dept: INTERNAL MEDICINE | Facility: CLINIC | Age: 88
End: 2023-02-04
Payer: MEDICARE

## 2023-02-04 DIAGNOSIS — I10 BENIGN ESSENTIAL HYPERTENSION: ICD-10-CM

## 2023-02-07 ENCOUNTER — MYC MEDICAL ADVICE (OUTPATIENT)
Dept: INTERNAL MEDICINE | Facility: CLINIC | Age: 88
End: 2023-02-07
Payer: MEDICARE

## 2023-02-07 DIAGNOSIS — I10 BENIGN ESSENTIAL HYPERTENSION: ICD-10-CM

## 2023-02-07 RX ORDER — METOPROLOL TARTRATE 25 MG/1
25 TABLET, FILM COATED ORAL 2 TIMES DAILY
Qty: 180 TABLET | Refills: 0 | OUTPATIENT
Start: 2023-02-07

## 2023-02-08 ENCOUNTER — MYC MEDICAL ADVICE (OUTPATIENT)
Dept: INTERNAL MEDICINE | Facility: CLINIC | Age: 88
End: 2023-02-08
Payer: MEDICARE

## 2023-02-08 DIAGNOSIS — I10 BENIGN ESSENTIAL HYPERTENSION: ICD-10-CM

## 2023-02-08 RX ORDER — METOPROLOL TARTRATE 25 MG/1
25 TABLET, FILM COATED ORAL 2 TIMES DAILY
Qty: 180 TABLET | Refills: 0 | Status: SHIPPED | OUTPATIENT
Start: 2023-02-08 | End: 2023-04-17

## 2023-02-08 NOTE — TELEPHONE ENCOUNTER
Addressed in MyChart encounter on 2/7. Closing encounter.    HANNAH CaicedoN RN  [facility]

## 2023-02-09 RX ORDER — METOPROLOL TARTRATE 25 MG/1
TABLET, FILM COATED ORAL
Qty: 180 TABLET | Refills: 0 | OUTPATIENT
Start: 2023-02-09

## 2023-03-14 ENCOUNTER — MYC MEDICAL ADVICE (OUTPATIENT)
Dept: CARDIOLOGY | Facility: CLINIC | Age: 88
End: 2023-03-14
Payer: MEDICARE

## 2023-04-01 ENCOUNTER — MYC REFILL (OUTPATIENT)
Dept: INTERNAL MEDICINE | Facility: CLINIC | Age: 88
End: 2023-04-01
Payer: MEDICARE

## 2023-04-01 DIAGNOSIS — E78.5 HYPERLIPIDEMIA LDL GOAL <100: ICD-10-CM

## 2023-04-03 RX ORDER — SIMVASTATIN 40 MG
40 TABLET ORAL AT BEDTIME
Qty: 60 TABLET | Refills: 0 | OUTPATIENT
Start: 2023-04-03

## 2023-04-05 ENCOUNTER — MYC REFILL (OUTPATIENT)
Dept: INTERNAL MEDICINE | Facility: CLINIC | Age: 88
End: 2023-04-05
Payer: MEDICARE

## 2023-04-05 DIAGNOSIS — E78.5 HYPERLIPIDEMIA LDL GOAL <100: ICD-10-CM

## 2023-04-07 RX ORDER — SIMVASTATIN 40 MG
40 TABLET ORAL AT BEDTIME
Qty: 60 TABLET | Refills: 0 | Status: SHIPPED | OUTPATIENT
Start: 2023-04-07 | End: 2023-04-17

## 2023-04-10 ASSESSMENT — ENCOUNTER SYMPTOMS
DIZZINESS: 0
DYSURIA: 0
HEARTBURN: 0
HEMATURIA: 0
BREAST MASS: 0
NAUSEA: 0
FREQUENCY: 1
HEADACHES: 0
ABDOMINAL PAIN: 0
PARESTHESIAS: 0
SHORTNESS OF BREATH: 0
ARTHRALGIAS: 0
PALPITATIONS: 0
HEMATOCHEZIA: 0
MYALGIAS: 0
COUGH: 1
CONSTIPATION: 0
CHILLS: 0
FEVER: 0
JOINT SWELLING: 0
NERVOUS/ANXIOUS: 0
EYE PAIN: 0
SORE THROAT: 0
DIARRHEA: 0
WEAKNESS: 1

## 2023-04-10 ASSESSMENT — ACTIVITIES OF DAILY LIVING (ADL)
CURRENT_FUNCTION: TRANSPORTATION REQUIRES ASSISTANCE
CURRENT_FUNCTION: HOUSEWORK REQUIRES ASSISTANCE
CURRENT_FUNCTION: LAUNDRY REQUIRES ASSISTANCE
CURRENT_FUNCTION: BATHING REQUIRES ASSISTANCE
CURRENT_FUNCTION: MEDICATION ADMINISTRATION REQUIRES ASSISTANCE
CURRENT_FUNCTION: PREPARING MEALS REQUIRES ASSISTANCE
CURRENT_FUNCTION: TELEPHONE REQUIRES ASSISTANCE
CURRENT_FUNCTION: SHOPPING REQUIRES ASSISTANCE
CURRENT_FUNCTION: MONEY MANAGEMENT REQUIRES ASSISTANCE

## 2023-04-17 ENCOUNTER — MYC MEDICAL ADVICE (OUTPATIENT)
Dept: INTERNAL MEDICINE | Facility: CLINIC | Age: 88
End: 2023-04-17

## 2023-04-17 ENCOUNTER — OFFICE VISIT (OUTPATIENT)
Dept: INTERNAL MEDICINE | Facility: CLINIC | Age: 88
End: 2023-04-17
Payer: MEDICARE

## 2023-04-17 VITALS
HEART RATE: 64 BPM | SYSTOLIC BLOOD PRESSURE: 160 MMHG | OXYGEN SATURATION: 96 % | BODY MASS INDEX: 26.82 KG/M2 | WEIGHT: 149 LBS | RESPIRATION RATE: 18 BRPM | TEMPERATURE: 97.5 F | DIASTOLIC BLOOD PRESSURE: 86 MMHG

## 2023-04-17 DIAGNOSIS — F03.90 DEMENTIA WITHOUT BEHAVIORAL DISTURBANCE (H): ICD-10-CM

## 2023-04-17 DIAGNOSIS — N18.31 STAGE 3A CHRONIC KIDNEY DISEASE (H): ICD-10-CM

## 2023-04-17 DIAGNOSIS — E78.5 HYPERLIPIDEMIA LDL GOAL <100: ICD-10-CM

## 2023-04-17 DIAGNOSIS — I77.9 CAROTID ARTERY DISEASE, UNSPECIFIED LATERALITY, UNSPECIFIED TYPE (H): ICD-10-CM

## 2023-04-17 DIAGNOSIS — Z00.00 ENCOUNTER FOR SUBSEQUENT ANNUAL WELLNESS VISIT (AWV) IN MEDICARE PATIENT: Primary | ICD-10-CM

## 2023-04-17 DIAGNOSIS — I10 HYPERTENSION GOAL BP (BLOOD PRESSURE) < 140/90: ICD-10-CM

## 2023-04-17 DIAGNOSIS — M81.0 AGE-RELATED OSTEOPOROSIS WITHOUT CURRENT PATHOLOGICAL FRACTURE: ICD-10-CM

## 2023-04-17 DIAGNOSIS — H61.23 BILATERAL IMPACTED CERUMEN: ICD-10-CM

## 2023-04-17 DIAGNOSIS — I25.810 CORONARY ARTERY DISEASE INVOLVING AUTOLOGOUS ARTERY CORONARY BYPASS GRAFT WITHOUT ANGINA PECTORIS: ICD-10-CM

## 2023-04-17 DIAGNOSIS — Z95.2 S/P AVR (AORTIC VALVE REPLACEMENT): ICD-10-CM

## 2023-04-17 LAB
ANION GAP SERPL CALCULATED.3IONS-SCNC: 5 MMOL/L (ref 3–14)
BASOPHILS # BLD AUTO: 0 10E3/UL (ref 0–0.2)
BASOPHILS NFR BLD AUTO: 0 %
BUN SERPL-MCNC: 6 MG/DL (ref 7–30)
CALCIUM SERPL-MCNC: 9.9 MG/DL (ref 8.5–10.1)
CHLORIDE BLD-SCNC: 97 MMOL/L (ref 94–109)
CHOLEST SERPL-MCNC: 119 MG/DL
CO2 SERPL-SCNC: 29 MMOL/L (ref 20–32)
CREAT SERPL-MCNC: 0.89 MG/DL (ref 0.52–1.04)
CREAT UR-MCNC: 51 MG/DL
EOSINOPHIL # BLD AUTO: 0.2 10E3/UL (ref 0–0.7)
EOSINOPHIL NFR BLD AUTO: 3 %
ERYTHROCYTE [DISTWIDTH] IN BLOOD BY AUTOMATED COUNT: 13.2 % (ref 10–15)
FASTING STATUS PATIENT QL REPORTED: NORMAL
GFR SERPL CREATININE-BSD FRML MDRD: 63 ML/MIN/1.73M2
GLUCOSE BLD-MCNC: 105 MG/DL (ref 70–99)
HCT VFR BLD AUTO: 42.8 % (ref 35–47)
HDLC SERPL-MCNC: 58 MG/DL
HGB BLD-MCNC: 14.3 G/DL (ref 11.7–15.7)
LDLC SERPL CALC-MCNC: 35 MG/DL
LYMPHOCYTES # BLD AUTO: 1.7 10E3/UL (ref 0.8–5.3)
LYMPHOCYTES NFR BLD AUTO: 22 %
MCH RBC QN AUTO: 31.2 PG (ref 26.5–33)
MCHC RBC AUTO-ENTMCNC: 33.4 G/DL (ref 31.5–36.5)
MCV RBC AUTO: 93 FL (ref 78–100)
MICROALBUMIN UR-MCNC: 37 MG/L
MICROALBUMIN/CREAT UR: 72.55 MG/G CR (ref 0–25)
MONOCYTES # BLD AUTO: 0.6 10E3/UL (ref 0–1.3)
MONOCYTES NFR BLD AUTO: 7 %
NEUTROPHILS # BLD AUTO: 5.3 10E3/UL (ref 1.6–8.3)
NEUTROPHILS NFR BLD AUTO: 67 %
NONHDLC SERPL-MCNC: 61 MG/DL
PLATELET # BLD AUTO: 221 10E3/UL (ref 150–450)
POTASSIUM BLD-SCNC: 5.2 MMOL/L (ref 3.4–5.3)
RBC # BLD AUTO: 4.58 10E6/UL (ref 3.8–5.2)
SODIUM SERPL-SCNC: 131 MMOL/L (ref 133–144)
TRIGL SERPL-MCNC: 128 MG/DL
WBC # BLD AUTO: 7.8 10E3/UL (ref 4–11)

## 2023-04-17 PROCEDURE — 80061 LIPID PANEL: CPT | Performed by: INTERNAL MEDICINE

## 2023-04-17 PROCEDURE — 82570 ASSAY OF URINE CREATININE: CPT | Performed by: INTERNAL MEDICINE

## 2023-04-17 PROCEDURE — G0439 PPPS, SUBSEQ VISIT: HCPCS | Performed by: INTERNAL MEDICINE

## 2023-04-17 PROCEDURE — 85025 COMPLETE CBC W/AUTO DIFF WBC: CPT | Performed by: INTERNAL MEDICINE

## 2023-04-17 PROCEDURE — 99213 OFFICE O/P EST LOW 20 MIN: CPT | Mod: 25 | Performed by: INTERNAL MEDICINE

## 2023-04-17 PROCEDURE — 82043 UR ALBUMIN QUANTITATIVE: CPT | Performed by: INTERNAL MEDICINE

## 2023-04-17 PROCEDURE — 80048 BASIC METABOLIC PNL TOTAL CA: CPT | Performed by: INTERNAL MEDICINE

## 2023-04-17 PROCEDURE — 36415 COLL VENOUS BLD VENIPUNCTURE: CPT | Performed by: INTERNAL MEDICINE

## 2023-04-17 RX ORDER — LISINOPRIL 20 MG/1
20 TABLET ORAL DAILY
Qty: 90 TABLET | Refills: 3 | Status: SHIPPED | OUTPATIENT
Start: 2023-04-17

## 2023-04-17 RX ORDER — SIMVASTATIN 40 MG
40 TABLET ORAL AT BEDTIME
Qty: 90 TABLET | Refills: 3 | Status: SHIPPED | OUTPATIENT
Start: 2023-04-17

## 2023-04-17 RX ORDER — METOPROLOL TARTRATE 25 MG/1
25 TABLET, FILM COATED ORAL 2 TIMES DAILY
Qty: 180 TABLET | Refills: 3 | Status: SHIPPED | OUTPATIENT
Start: 2023-04-17

## 2023-04-17 ASSESSMENT — ENCOUNTER SYMPTOMS
NERVOUS/ANXIOUS: 0
HEADACHES: 0
CHILLS: 0
DYSURIA: 0
DIZZINESS: 0
PARESTHESIAS: 0
ABDOMINAL PAIN: 0
JOINT SWELLING: 0
HEMATOCHEZIA: 0
CONSTIPATION: 0
ARTHRALGIAS: 0
WEAKNESS: 1
EYE PAIN: 0
SHORTNESS OF BREATH: 0
BREAST MASS: 0
PALPITATIONS: 0
DIARRHEA: 0
FREQUENCY: 1
NAUSEA: 0
SORE THROAT: 0
MYALGIAS: 0
HEARTBURN: 0
HEMATURIA: 0
FEVER: 0
COUGH: 1

## 2023-04-17 ASSESSMENT — ACTIVITIES OF DAILY LIVING (ADL)
CURRENT_FUNCTION: TRANSPORTATION REQUIRES ASSISTANCE
CURRENT_FUNCTION: LAUNDRY REQUIRES ASSISTANCE
CURRENT_FUNCTION: BATHING REQUIRES ASSISTANCE
CURRENT_FUNCTION: TELEPHONE REQUIRES ASSISTANCE
CURRENT_FUNCTION: HOUSEWORK REQUIRES ASSISTANCE
CURRENT_FUNCTION: PREPARING MEALS REQUIRES ASSISTANCE
CURRENT_FUNCTION: MEDICATION ADMINISTRATION REQUIRES ASSISTANCE
CURRENT_FUNCTION: SHOPPING REQUIRES ASSISTANCE
CURRENT_FUNCTION: MONEY MANAGEMENT REQUIRES ASSISTANCE

## 2023-04-17 NOTE — PROGRESS NOTES
"SUBJECTIVE:   Raeann is a 87 year old who presents for Preventive Visit.       View : No data to display.            Patient has been advised of split billing requirements and indicates understanding: Yes  Are you in the first 12 months of your Medicare coverage?  No    Here with son Everardo. He helps with upkeep and food supply to home with patient and her  . She has bilateral total hip replacements. There has been 10 times dislocations over the last 2 years. Son thinks it is always the left hip ? This original surgery was around 10 years ago  Also had a coronary artery bypass surgery 8 years ago, triple? Everardo is not a durable power of  for medical affairs . We looked at options and decide a wait and see approach is the right choice in this frail elderly woman and nearly nonagenarian status    Healthy Habits:     In general, how would you rate your overall health?  Poor    Frequency of exercise:  None    Do you usually eat at least 4 servings of fruit and vegetables a day, include whole grains    & fiber and avoid regularly eating high fat or \"junk\" foods?  No    Taking medications regularly:  Yes    Medication side effects:  None    Ability to successfully perform activities of daily living:  Telephone requires assistance, transportation requires assistance, shopping requires assistance, preparing meals requires assistance, housework requires assistance, bathing requires assistance, laundry requires assistance, medication administration requires assistance and money management requires assistance    Home Safety:  Lack of grab bars in the bathroom and lighting is poor    Hearing Impairment:  No hearing concerns    In the past 6 months, have you been bothered by leaking of urine? Yes    In general, how would you rate your overall mental or emotional health?  Poor      PHQ-2 Total Score: 0    Additional concerns today:  No      Have you ever done Advance Care Planning? (For example, a Health Directive, " POLST, or a discussion with a medical provider or your loved ones about your wishes): No, advance care planning information given to patient to review.  Advanced care planning was discussed at today's visit.      Fall risk  Fallen 2 or more times in the past year?: Yes  Any fall with injury in the past year?: No    Cognitive Screening Not appropriate due to known dementia    Do you have sleep apnea, excessive snoring or daytime drowsiness?: no    Reviewed and updated as needed this visit by clinical staff   Tobacco  Allergies  Meds              Reviewed and updated as needed this visit by Provider                 Social History     Tobacco Use     Smoking status: Never     Smokeless tobacco: Never     Tobacco comments:     Never Used Tobacco. 35 yrs of 2nd hand smoke.   Vaping Use     Vaping status: Not on file   Substance Use Topics     Alcohol use: Yes     Comment: 1 beer daily             4/10/2023     9:25 AM   Alcohol Use   Prescreen: >3 drinks/day or >7 drinks/week? No          View : No data to display.              Do you have a current opioid prescription? No  Do you use any other controlled substances or medications that are not prescribed by a provider? None      Current providers sharing in care for this patient include:   Patient Care Team:  Max Haney MD as PCP - General (Internal Medicine)  Josie Askew MD (Internal Medicine)  Martin Seth MD as MD (Cardiovascular Disease)  Max Haney MD as Assigned PCP  TANYA Fajardo MD as Assigned Heart and Vascular Provider    The following health maintenance items are reviewed in Epic and correct as of today:  Health Maintenance   Topic Date Due     MICROALBUMIN  Never done     ANNUAL REVIEW OF HM ORDERS  Never done     COVID-19 Vaccine (1) Never done     ZOSTER IMMUNIZATION (1 of 2) Never done     EYE EXAM  09/23/2021     INFLUENZA VACCINE (1) 09/01/2022     MEDICARE ANNUAL WELLNESS VISIT  09/20/2022     HEMOGLOBIN  09/20/2022     BMP   10/01/2022     LIPID  10/16/2022     FALL RISK ASSESSMENT  04/17/2024     ADVANCE CARE PLANNING  09/20/2026     DTAP/TDAP/TD IMMUNIZATION (2 - Td or Tdap) 09/27/2032     PHQ-2 (once per calendar year)  Completed     Pneumococcal Vaccine: 65+ Years  Completed     URINALYSIS  Completed     IPV IMMUNIZATION  Aged Out     MENINGITIS IMMUNIZATION  Aged Out     Health Maintenance Due   Topic Date Due     MICROALBUMIN  Never done     ANNUAL REVIEW OF HM ORDERS  Never done     COVID-19 Vaccine (1) Never done     ZOSTER IMMUNIZATION (1 of 2) Never done     EYE EXAM  09/23/2021     INFLUENZA VACCINE (1) 09/01/2022     MEDICARE ANNUAL WELLNESS VISIT  09/20/2022     HEMOGLOBIN  09/20/2022     BMP  10/01/2022     LIPID  10/16/2022        Lab work is in process  Labs reviewed in EPIC  BP Readings from Last 3 Encounters:   04/17/23 (!) 160/86   07/07/22 134/73   08/04/20 126/78    Wt Readings from Last 3 Encounters:   04/17/23 67.6 kg (149 lb)   07/07/22 62.6 kg (138 lb)   08/04/20 69.7 kg (153 lb 9.6 oz)                  Patient Active Problem List   Diagnosis     Hypertension goal BP (blood pressure) < 140/90     S/P AVR (aortic valve replacement)     S/P CABG (coronary artery bypass graft)     Short-term memory loss     S/P bilateral hip replacements     History of right-sided carotid endarterectomy     CKD (chronic kidney disease) stage 3, GFR 30-59 ml/min (H)     Hyperlipidemia LDL goal <100     Coronary artery disease involving autologous artery coronary bypass graft without angina pectoris     Posterior vitreous detachment, bilateral     Pseudophakia, ou     Hx of retinal hemorrhage     Age-related osteoporosis without current pathological fracture     Dementia without behavioral disturbance, unspecified dementia type     Past Surgical History:   Procedure Laterality Date     ABDOMEN SURGERY  Feb. 2014    aortic valve replacement     AORTIC VALVE REPLACEMENT       BYPASS GRAFT ARTERY CORONARY      X 3     CATARACT IOL,  RT/LT       COLONOSCOPY  2010 ?    polyps found for first time     endarectomy Right     carotid     ENT SURGERY       HEAD & NECK SURGERY      rt carotid artery neck plaque removal     JOINT REPLACEMENT, HIP RT/LT Bilateral      PHACOEMULSIFICATION WITH STANDARD INTRAOCULAR LENS IMPLANT  07/2017; 8/2017    right eye; left eye     THORACIC SURGERY  2014    aortic valve replacement, triple bypass     TONSILLECTOMY N/A      VASCULAR SURGERY  2013    rt carotid artery neck plaque removal       Social History     Tobacco Use     Smoking status: Never     Smokeless tobacco: Never     Tobacco comments:     Never Used Tobacco. 35 yrs of 2nd hand smoke.   Vaping Use     Vaping status: Not on file   Substance Use Topics     Alcohol use: Yes     Comment: 1 beer daily     Family History   Problem Relation Age of Onset     Other Cancer Sister         cervical     Macular Degeneration Mother 75     Other Cancer Mother         Ovarian     Coronary Artery Disease Father      Hypertension Father      Hyperlipidemia Father      Diabetes Father      Other Cancer Sister         cervical     Glaucoma No family hx of          Current Outpatient Medications   Medication Sig Dispense Refill     acetaminophen (TYLENOL) 500 MG tablet Take 500-1,000 mg by mouth every 6 hours as needed for mild pain       amoxicillin (AMOXIL) 500 MG capsule Take 4 capsules (2,000 mg) by mouth 1 hour prior to dental procedure. 4 capsule 1     aspirin 81 MG EC tablet Take 81 mg by mouth 2 times daily       carboxymethylcellulose (REFRESH PLUS) 0.5 % SOLN Place 1 drop into both eyes daily as needed for dry eyes       lisinopril (ZESTRIL) 10 MG tablet Take 1 tablet (10 mg) by mouth daily Needs follow up appointment and labs with MD 90 tablet 0     metoprolol tartrate (LOPRESSOR) 25 MG tablet Take 1 tablet (25 mg) by mouth 2 times daily 180 tablet 0     Multiple Vitamins-Minerals (MULTI VITAMIN/MINERALS PO) Take 1 tablet by mouth once Centrum Silver        simvastatin (ZOCOR) 40 MG tablet Take 1 tablet (40 mg) by mouth At Bedtime No further refills until appointment  , face to face encounter requested , laboratory studies are necessary 60 tablet 0     denosumab (PROLIA) 60 MG/ML SOSY injection Inject 1 mL (60 mg) Subcutaneous once for 1 dose 1 mL 0     Allergies   Allergen Reactions     Sulfa Drugs      Recent Labs   Lab Test 10/16/21  0616 10/01/21  1437 09/20/21  1217 11/03/20  0947 08/04/20  1316 02/20/20  1150 08/02/19  0840   A1C  --   --  5.3  --   --   --   --    LDL  --   --  59 29  --   --  47   HDL  --   --  66 66  --   --  54   TRIG 116  --  121 133  --   --  132   CR  --  0.85 0.84 0.94 0.91   < > 1.00   GFRESTIMATED  --  63 64 56* 58*   < > 52*   GFRESTBLACK  --   --   --  65 67   < > 61   POTASSIUM  --  5.2 5.6* 4.8 4.4   < > 4.3    < > = values in this interval not displayed.              Pertinent mammograms are reviewed under the imaging tab.    Review of Systems   Constitutional: Negative for chills and fever.   HENT: Negative for congestion, ear pain, hearing loss and sore throat.    Eyes: Negative for pain and visual disturbance.   Respiratory: Positive for cough. Negative for shortness of breath.    Cardiovascular: Negative for chest pain, palpitations and peripheral edema.   Gastrointestinal: Negative for abdominal pain, constipation, diarrhea, heartburn, hematochezia and nausea.   Breasts:  Negative for tenderness, breast mass and discharge.   Genitourinary: Positive for frequency and urgency. Negative for dysuria, genital sores, hematuria, pelvic pain, vaginal bleeding and vaginal discharge.   Musculoskeletal: Negative for arthralgias, joint swelling and myalgias.   Skin: Negative for rash.   Neurological: Positive for weakness. Negative for dizziness, headaches and paresthesias.   Psychiatric/Behavioral: Negative for mood changes. The patient is not nervous/anxious.      Constitutional, HEENT, cardiovascular, pulmonary, gi and gu systems are  "negative, except as otherwise noted.    OBJECTIVE:   BP (!) 160/86   Pulse 64   Temp 97.5  F (36.4  C) (Temporal)   Resp 18   Wt 67.6 kg (149 lb)   SpO2 96%   BMI 26.82 kg/m   Estimated body mass index is 24.84 kg/m  as calculated from the following:    Height as of 6/24/20: 1.588 m (5' 2.5\").    Weight as of 7/7/22: 62.6 kg (138 lb).  Physical Exam  GENERAL: healthy, alert and no distress  EYES: Eyes grossly normal to inspection, PERRL and conjunctivae and sclerae normal  HENT: ear canals and TM's normal, nose and mouth without ulcers or lesions  NECK: no adenopathy, no asymmetry, masses, or scars and thyroid normal to palpation  RESP: lungs clear to auscultation - no rales, rhonchi or wheezes  BREAST: normal for age  CV: regular rate and rhythm, normal S1 S2, no S3 or S4, no murmur, click or rub, no peripheral edema and peripheral pulses strong  ABDOMEN: soft, nontender, no hepatosplenomegaly, no masses and bowel sounds normal  MS: no gross musculoskeletal defects noted, no edema  SKIN: no suspicious lesions or rashes  NEURO: Normal strength and tone, mentation intact and speech normal  PSYCH: mentation appears normal, affect normal/bright    Diagnostic Test Results:  Labs reviewed in Epic    ASSESSMENT / PLAN:   (Z00.00) Encounter for subsequent annual wellness visit (AWV) in Medicare patient  (primary encounter diagnosis)  Comment: routine screening issues   Plan: see orders section of this encounter     (F03.90) Dementia without behavioral disturbance, unspecified dementia type  Comment: family well aware of the issues and diagnosis   Plan: problem is stable and ongoing monitoring      (I25.810) Coronary artery disease involving autologous artery coronary bypass graft without angina pectoris  Comment: problem is stable and ongoing monitoring    Plan:     (M81.0) Age-related osteoporosis without current pathological fracture  Comment: we agreed to recheck bone health after about 3 years of therapy with " Denosumab injection (Prolia)   Plan: DX Hip/Pelvis/Spine            (N18.31) Stage 3a chronic kidney disease (H)  Comment: problem is stable and ongoing monitoring    Plan: Albumin Random Urine Quantitative with Creat         Ratio, Basic metabolic panel  (Ca, Cl, CO2,         Creat, Gluc, K, Na, BUN)            (Z95.2) S/P AVR (aortic valve replacement)  Comment: problem is stable and ongoing monitoring    Plan:     (E78.5) Hyperlipidemia LDL goal <100  Comment: problem is stable and ongoing monitoring    Plan: simvastatin (ZOCOR) 40 MG tablet, Lipid panel         reflex to direct LDL Fasting            (I77.9) Carotid artery disease, unspecified laterality, unspecified type (H)  Comment: problem is stable and ongoing monitoring    Plan: CBC with platelets and differential            (I10) Hypertension goal BP (blood pressure) < 140/90  Comment: out of control. A bump upwards with lisinopril is the logical next step and medical assistant blood pressure recheck in 4-6 weeks is recommended   Plan: lisinopril (ZESTRIL) 20 MG tablet, metoprolol         tartrate (LOPRESSOR) 25 MG tablet, Albumin         Random Urine Quantitative with Creat Ratio, CBC        with platelets and differential, Basic         metabolic panel  (Ca, Cl, CO2, Creat, Gluc, K,         Na, BUN)            (H61.23) Bilateral impacted cerumen  Comment: successfully irrigated out by my medical assistant    Plan: REMOVE IMPACTED CERUMEN              Patient has been advised of split billing requirements and indicates understanding: Yes      COUNSELING:  Reviewed preventive health counseling, as reflected in patient instructions        She reports that she has never smoked. She has never used smokeless tobacco.      Appropriate preventive services were discussed with this patient, including applicable screening as appropriate for cardiovascular disease, diabetes, osteopenia/osteoporosis, and glaucoma.  As appropriate for age/gender, discussed screening  for colorectal cancer, prostate cancer, breast cancer, and cervical cancer. Checklist reviewing preventive services available has been given to the patient.    Reviewed patients plan of care and provided an AVS. The Basic Care Plan (routine screening as documented in Health Maintenance) for Raeann meets the Care Plan requirement. This Care Plan has been established and reviewed with the Patient and son.          Max Haney MD  [facility]    Identified Health Risks:    I have reviewed Opioid Use Disorder and Substance Use Disorder risk factors and made any needed referrals.

## 2023-04-17 NOTE — LETTER
April 18, 2023    Raeann JEONG Lianne  [address] [place] [address]  [facility] [address]          Dear ,    We are writing to inform you of your test results.  All of these tests are within acceptable limits , things look good !        Resulted Orders   Albumin Random Urine Quantitative with Creat Ratio   Result Value Ref Range    Creatinine Urine mg/dL 51 mg/dL    Albumin Urine mg/L 37 mg/L    Albumin Urine mg/g Cr 72.55 (H) 0.00 - 25.00 mg/g Cr   Basic metabolic panel  (Ca, Cl, CO2, Creat, Gluc, K, Na, BUN)   Result Value Ref Range    Sodium 131 (L) 133 - 144 mmol/L    Potassium 5.2 3.4 - 5.3 mmol/L    Chloride 97 94 - 109 mmol/L    Carbon Dioxide (CO2) 29 20 - 32 mmol/L    Anion Gap 5 3 - 14 mmol/L    Urea Nitrogen 6 (L) 7 - 30 mg/dL    Creatinine 0.89 0.52 - 1.04 mg/dL    Calcium 9.9 8.5 - 10.1 mg/dL    Glucose 105 (H) 70 - 99 mg/dL    GFR Estimate 63 >60 mL/min/1.73m2      Comment:      eGFR calculated using 2021 CKD-EPI equation.   Lipid panel reflex to direct LDL Fasting   Result Value Ref Range    Cholesterol 119 <200 mg/dL    Triglycerides 128 <150 mg/dL    Direct Measure HDL 58 >=50 mg/dL    LDL Cholesterol Calculated 35 <=100 mg/dL    Non HDL Cholesterol 61 <130 mg/dL    Patient Fasting > 8hrs? Unknown     Narrative    Cholesterol  Desirable:  <200 mg/dL    Triglycerides  Normal:  Less than 150 mg/dL  Borderline High:  150-199 mg/dL  High:  200-499 mg/dL  Very High:  Greater than or equal to 500 mg/dL    Direct Measure HDL  Female:  Greater than or equal to 50 mg/dL   Male:  Greater than or equal to 40 mg/dL    LDL Cholesterol  Desirable:  <100mg/dL  Above Desirable:  100-129 mg/dL   Borderline High:  130-159 mg/dL   High:  160-189 mg/dL   Very High:  >= 190 mg/dL    Non HDL Cholesterol  Desirable:  130 mg/dL  Above Desirable:  130-159 mg/dL  Borderline High:  160-189 mg/dL  High:  190-219 mg/dL  Very High:  Greater than or equal to 220 mg/dL   CBC with platelets and differential   Result Value Ref Range    WBC Count  7.8 4.0 - 11.0 10e3/uL    RBC Count 4.58 3.80 - 5.20 10e6/uL    Hemoglobin 14.3 11.7 - 15.7 g/dL    Hematocrit 42.8 35.0 - 47.0 %    MCV 93 78 - 100 fL    MCH 31.2 26.5 - 33.0 pg    MCHC 33.4 31.5 - 36.5 g/dL    RDW 13.2 10.0 - 15.0 %    Platelet Count 221 150 - 450 10e3/uL    % Neutrophils 67 %    % Lymphocytes 22 %    % Monocytes 7 %    % Eosinophils 3 %    % Basophils 0 %    Absolute Neutrophils 5.3 1.6 - 8.3 10e3/uL    Absolute Lymphocytes 1.7 0.8 - 5.3 10e3/uL    Absolute Monocytes 0.6 0.0 - 1.3 10e3/uL    Absolute Eosinophils 0.2 0.0 - 0.7 10e3/uL    Absolute Basophils 0.0 0.0 - 0.2 10e3/uL       If you have any questions or concerns, please call the clinic at the number listed above.       Sincerely,      Max Haney MD             10-Jul-2018 01:18

## 2023-04-24 NOTE — TELEPHONE ENCOUNTER
Please do a review of this situation ?    Max Haney MD      Prior to me contacting spouse Antoni, can you please review the billing situation with me ? From the sound of things the problem stems from the fact that this was scheduled for and billed for an annual wellness visit too soon, less then a year apart is that right ?    Max Haney MD     No

## 2023-08-14 DIAGNOSIS — I10 HYPERTENSION GOAL BP (BLOOD PRESSURE) < 140/90: ICD-10-CM

## 2023-08-14 RX ORDER — METOPROLOL TARTRATE 25 MG/1
25 TABLET, FILM COATED ORAL 2 TIMES DAILY
Qty: 180 TABLET | Refills: 3 | OUTPATIENT
Start: 2023-08-14

## 2023-11-11 ENCOUNTER — MYC REFILL (OUTPATIENT)
Dept: INTERNAL MEDICINE | Facility: CLINIC | Age: 88
End: 2023-11-11
Payer: MEDICARE

## 2023-11-11 DIAGNOSIS — I10 HYPERTENSION GOAL BP (BLOOD PRESSURE) < 140/90: ICD-10-CM

## 2023-11-13 RX ORDER — METOPROLOL TARTRATE 25 MG/1
25 TABLET, FILM COATED ORAL 2 TIMES DAILY
Qty: 180 TABLET | Refills: 3 | OUTPATIENT
Start: 2023-11-13

## 2023-11-28 NOTE — TELEPHONE ENCOUNTER
Next 5 appointments (look out 90 days)     Feb 24, 2017  2:40 PM CST   Alivia Arias with STAR Quezada CNP   [facility] ([facility]    [address]  [place] [address]   [phone]                   Potassium   Date Value Ref Range Status   12/31/2015 5.1 3.4 - 5.3 mmol/L Final     Creatinine   Date Value Ref Range Status   12/31/2015 0.95 0.52 - 1.04 mg/dL Final     BP Readings from Last 3 Encounters:   06/30/16 124/70   12/31/15 160/80   06/08/15 135/79     Prescription approved per [facility] Refill Protocol.  Patient due for office visit for further refills, mychart message sent.   Alyson Soria RN       Statement Selected

## 2024-03-18 ENCOUNTER — PATIENT OUTREACH (OUTPATIENT)
Dept: CARE COORDINATION | Facility: CLINIC | Age: 89
End: 2024-03-18
Payer: MEDICARE

## 2024-04-01 ENCOUNTER — PATIENT OUTREACH (OUTPATIENT)
Dept: CARE COORDINATION | Facility: CLINIC | Age: 89
End: 2024-04-01
Payer: MEDICARE

## 2024-06-29 ENCOUNTER — HEALTH MAINTENANCE LETTER (OUTPATIENT)
Age: 89
End: 2024-06-29

## 2024-10-14 ENCOUNTER — PATIENT OUTREACH (OUTPATIENT)
Dept: CARE COORDINATION | Facility: CLINIC | Age: 89
End: 2024-10-14
Payer: MEDICARE

## 2025-07-13 ENCOUNTER — HEALTH MAINTENANCE LETTER (OUTPATIENT)
Age: 89
End: 2025-07-13